# Patient Record
Sex: MALE | Race: WHITE
[De-identification: names, ages, dates, MRNs, and addresses within clinical notes are randomized per-mention and may not be internally consistent; named-entity substitution may affect disease eponyms.]

---

## 2017-06-14 ENCOUNTER — RX ONLY (RX ONLY)
Age: 62
End: 2017-06-14

## 2017-06-14 ENCOUNTER — DOCTOR'S OFFICE (OUTPATIENT)
Dept: URBAN - NONMETROPOLITAN AREA CLINIC 1 | Facility: CLINIC | Age: 62
Setting detail: OPHTHALMOLOGY
End: 2017-06-14
Payer: OTHER GOVERNMENT

## 2017-06-14 DIAGNOSIS — H25.11: ICD-10-CM

## 2017-06-14 DIAGNOSIS — Z96.1: ICD-10-CM

## 2017-06-14 DIAGNOSIS — H35.342: ICD-10-CM

## 2017-06-14 DIAGNOSIS — F17.200: ICD-10-CM

## 2017-06-14 DIAGNOSIS — H18.422: ICD-10-CM

## 2017-06-14 PROCEDURE — 92134 CPTRZ OPH DX IMG PST SGM RTA: CPT | Performed by: OPHTHALMOLOGY

## 2017-06-14 PROCEDURE — 92014 COMPRE OPH EXAM EST PT 1/>: CPT | Performed by: OPHTHALMOLOGY

## 2017-06-14 ASSESSMENT — REFRACTION_MANIFEST
OU_VA: 20/
OS_VA3: 20/
OU_VA: 20/
OD_VA2: 20/
OD_VA3: 20/
OS_VA2: 20/
OD_VA1: 20/
OD_VA3: 20/
OS_VA1: 20/
OD_VA1: 20/
OS_VA1: 20/
OS_VA3: 20/
OD_VA2: 20/
OS_VA2: 20/

## 2017-06-14 ASSESSMENT — VISUAL ACUITY
OS_BCVA: 20/25-1
OD_BCVA: 20/40-2

## 2017-06-14 ASSESSMENT — REFRACTION_AUTOREFRACTION
OD_CYLINDER: -1.50
OS_AXIS: 72
OS_SPHERE: +0.75
OD_SPHERE: +1.00
OD_AXIS: 77
OS_CYLINDER: -1.00

## 2017-06-14 ASSESSMENT — REFRACTION_OUTSIDERX
OS_ADD: +2.50
OD_SPHERE: +0.50
OD_VA1: 20/25
OD_VA2: 20/25-1
OU_VA: 20/
OS_SPHERE: PL
OS_VA3: 20/
OD_VA3: 20/
OD_ADD: +2.50
OS_AXIS: 075
OS_VA2: 20/40
OD_AXIS: 085
OD_CYLINDER: -1.25
OS_CYLINDER: -1.00

## 2017-06-14 ASSESSMENT — SPHEQUIV_DERIVED
OS_SPHEQUIV: 0.25
OD_SPHEQUIV: 0.25

## 2017-06-14 ASSESSMENT — CONFRONTATIONAL VISUAL FIELD TEST (CVF)
OS_FINDINGS: FULL
OD_FINDINGS: FULL

## 2017-06-14 ASSESSMENT — REFRACTION_CURRENTRX
OS_OVR_VA: 20/
OS_OVR_VA: 20/
OD_OVR_VA: 20/
OS_OVR_VA: 20/
OD_OVR_VA: 20/
OD_OVR_VA: 20/

## 2021-02-26 ENCOUNTER — HOSPITAL ENCOUNTER (EMERGENCY)
Facility: HOSPITAL | Age: 66
Discharge: ANOTHER HOSPITAL | End: 2021-02-26
Attending: EMERGENCY MEDICINE | Admitting: EMERGENCY MEDICINE
Payer: MEDICARE

## 2021-02-26 ENCOUNTER — APPOINTMENT (EMERGENCY)
Dept: CT IMAGING | Facility: HOSPITAL | Age: 66
End: 2021-02-26
Payer: MEDICARE

## 2021-02-26 ENCOUNTER — APPOINTMENT (EMERGENCY)
Dept: RADIOLOGY | Facility: HOSPITAL | Age: 66
End: 2021-02-26
Payer: MEDICARE

## 2021-02-26 VITALS
OXYGEN SATURATION: 97 % | WEIGHT: 191.58 LBS | BODY MASS INDEX: 28.38 KG/M2 | RESPIRATION RATE: 21 BRPM | DIASTOLIC BLOOD PRESSURE: 96 MMHG | HEIGHT: 69 IN | HEART RATE: 64 BPM | TEMPERATURE: 99 F | SYSTOLIC BLOOD PRESSURE: 145 MMHG

## 2021-02-26 DIAGNOSIS — R07.9 CHEST PAIN: Primary | ICD-10-CM

## 2021-02-26 DIAGNOSIS — I21.4 NSTEMI (NON-ST ELEVATED MYOCARDIAL INFARCTION) (HCC): ICD-10-CM

## 2021-02-26 LAB
ALBUMIN SERPL BCP-MCNC: 3.9 G/DL (ref 3.5–5)
ALP SERPL-CCNC: 68 U/L (ref 46–116)
ALT SERPL W P-5'-P-CCNC: 47 U/L (ref 12–78)
ANION GAP SERPL CALCULATED.3IONS-SCNC: 9 MMOL/L (ref 4–13)
APTT PPP: 30 SECONDS (ref 23–37)
AST SERPL W P-5'-P-CCNC: 264 U/L (ref 5–45)
BASOPHILS # BLD AUTO: 0.05 THOUSANDS/ΜL (ref 0–0.1)
BASOPHILS NFR BLD AUTO: 0 % (ref 0–1)
BILIRUB SERPL-MCNC: 0.83 MG/DL (ref 0.2–1)
BUN SERPL-MCNC: 13 MG/DL (ref 5–25)
CALCIUM SERPL-MCNC: 9.4 MG/DL (ref 8.3–10.1)
CHLORIDE SERPL-SCNC: 101 MMOL/L (ref 100–108)
CO2 SERPL-SCNC: 26 MMOL/L (ref 21–32)
CREAT SERPL-MCNC: 1.15 MG/DL (ref 0.6–1.3)
EOSINOPHIL # BLD AUTO: 0.02 THOUSAND/ΜL (ref 0–0.61)
EOSINOPHIL NFR BLD AUTO: 0 % (ref 0–6)
ERYTHROCYTE [DISTWIDTH] IN BLOOD BY AUTOMATED COUNT: 13.4 % (ref 11.6–15.1)
FLUAV RNA RESP QL NAA+PROBE: NEGATIVE
FLUBV RNA RESP QL NAA+PROBE: NEGATIVE
GFR SERPL CREATININE-BSD FRML MDRD: 65 ML/MIN/1.73SQ M
GLUCOSE SERPL-MCNC: 117 MG/DL (ref 65–140)
HCT VFR BLD AUTO: 44.2 % (ref 36.5–49.3)
HGB BLD-MCNC: 14.9 G/DL (ref 12–17)
IMM GRANULOCYTES # BLD AUTO: 0.04 THOUSAND/UL (ref 0–0.2)
IMM GRANULOCYTES NFR BLD AUTO: 0 % (ref 0–2)
INR PPP: 0.99 (ref 0.84–1.19)
LYMPHOCYTES # BLD AUTO: 2.1 THOUSANDS/ΜL (ref 0.6–4.47)
LYMPHOCYTES NFR BLD AUTO: 16 % (ref 14–44)
MCH RBC QN AUTO: 31.7 PG (ref 26.8–34.3)
MCHC RBC AUTO-ENTMCNC: 33.7 G/DL (ref 31.4–37.4)
MCV RBC AUTO: 94 FL (ref 82–98)
MONOCYTES # BLD AUTO: 1.13 THOUSAND/ΜL (ref 0.17–1.22)
MONOCYTES NFR BLD AUTO: 9 % (ref 4–12)
NEUTROPHILS # BLD AUTO: 9.9 THOUSANDS/ΜL (ref 1.85–7.62)
NEUTS SEG NFR BLD AUTO: 75 % (ref 43–75)
NRBC BLD AUTO-RTO: 0 /100 WBCS
PLATELET # BLD AUTO: 196 THOUSANDS/UL (ref 149–390)
PMV BLD AUTO: 9.9 FL (ref 8.9–12.7)
POTASSIUM SERPL-SCNC: 4 MMOL/L (ref 3.5–5.3)
PROT SERPL-MCNC: 7.7 G/DL (ref 6.4–8.2)
PROTHROMBIN TIME: 12.9 SECONDS (ref 11.6–14.5)
RBC # BLD AUTO: 4.7 MILLION/UL (ref 3.88–5.62)
RSV RNA RESP QL NAA+PROBE: NEGATIVE
SARS-COV-2 RNA RESP QL NAA+PROBE: NEGATIVE
SODIUM SERPL-SCNC: 136 MMOL/L (ref 136–145)
TROPONIN I SERPL-MCNC: >40 NG/ML
WBC # BLD AUTO: 13.24 THOUSAND/UL (ref 4.31–10.16)

## 2021-02-26 PROCEDURE — 80053 COMPREHEN METABOLIC PANEL: CPT | Performed by: EMERGENCY MEDICINE

## 2021-02-26 PROCEDURE — 96365 THER/PROPH/DIAG IV INF INIT: CPT

## 2021-02-26 PROCEDURE — 93005 ELECTROCARDIOGRAM TRACING: CPT

## 2021-02-26 PROCEDURE — 85610 PROTHROMBIN TIME: CPT | Performed by: EMERGENCY MEDICINE

## 2021-02-26 PROCEDURE — 99291 CRITICAL CARE FIRST HOUR: CPT | Performed by: EMERGENCY MEDICINE

## 2021-02-26 PROCEDURE — 96366 THER/PROPH/DIAG IV INF ADDON: CPT

## 2021-02-26 PROCEDURE — 0241U HB NFCT DS VIR RESP RNA 4 TRGT: CPT | Performed by: EMERGENCY MEDICINE

## 2021-02-26 PROCEDURE — 99285 EMERGENCY DEPT VISIT HI MDM: CPT

## 2021-02-26 PROCEDURE — 70450 CT HEAD/BRAIN W/O DYE: CPT

## 2021-02-26 PROCEDURE — 71045 X-RAY EXAM CHEST 1 VIEW: CPT

## 2021-02-26 PROCEDURE — 85025 COMPLETE CBC W/AUTO DIFF WBC: CPT | Performed by: EMERGENCY MEDICINE

## 2021-02-26 PROCEDURE — 96375 TX/PRO/DX INJ NEW DRUG ADDON: CPT

## 2021-02-26 PROCEDURE — 36415 COLL VENOUS BLD VENIPUNCTURE: CPT | Performed by: EMERGENCY MEDICINE

## 2021-02-26 PROCEDURE — 84484 ASSAY OF TROPONIN QUANT: CPT | Performed by: EMERGENCY MEDICINE

## 2021-02-26 PROCEDURE — 85730 THROMBOPLASTIN TIME PARTIAL: CPT | Performed by: EMERGENCY MEDICINE

## 2021-02-26 RX ORDER — NITROGLYCERIN 0.4 MG/1
0.4 TABLET SUBLINGUAL ONCE
Status: COMPLETED | OUTPATIENT
Start: 2021-02-26 | End: 2021-02-26

## 2021-02-26 RX ORDER — ASPIRIN 81 MG/1
324 TABLET, CHEWABLE ORAL ONCE
Status: COMPLETED | OUTPATIENT
Start: 2021-02-26 | End: 2021-02-26

## 2021-02-26 RX ORDER — HEPARIN SODIUM 10000 [USP'U]/100ML
3-20 INJECTION, SOLUTION INTRAVENOUS
Status: DISCONTINUED | OUTPATIENT
Start: 2021-02-26 | End: 2021-02-26 | Stop reason: HOSPADM

## 2021-02-26 RX ORDER — HEPARIN SODIUM 1000 [USP'U]/ML
4000 INJECTION, SOLUTION INTRAVENOUS; SUBCUTANEOUS ONCE
Status: COMPLETED | OUTPATIENT
Start: 2021-02-26 | End: 2021-02-26

## 2021-02-26 RX ADMIN — ASPIRIN 324 MG: 81 TABLET, CHEWABLE ORAL at 12:00

## 2021-02-26 RX ADMIN — HEPARIN SODIUM 4000 UNITS: 1000 INJECTION INTRAVENOUS; SUBCUTANEOUS at 13:23

## 2021-02-26 RX ADMIN — HEPARIN SODIUM 11.8 UNITS/KG/HR: 10000 INJECTION, SOLUTION INTRAVENOUS at 13:24

## 2021-02-26 RX ADMIN — NITROGLYCERIN 0.4 MG: 0.4 TABLET SUBLINGUAL at 12:00

## 2021-02-26 NOTE — EMTALA/ACUTE CARE TRANSFER
[address]  [facility] [address]  Dept: [phone]      EMTALA TRANSFER CONSENT    NAME Bonnie Tovar                                         1955                              MRN [id number]    I have been informed of my rights regarding examination, treatment, and transfer   by Dr Suzanne Ramos DO    Benefits: Specialized equipment and/or services available at the receiving facility (Include comment)________________________     Need for cardiac catheterization    Risks: Potential for delay in receiving treatment      Consent for Transfer:  I acknowledge that my medical condition has been evaluated and explained to me by the emergency department physician or other qualified medical person and/or my attending physician, who has recommended that I be transferred to the service of  Accepting Physician: Rosa Gutiérrez at [address], [address] : [facility], [facility]  The above potential benefits of such transfer, the potential risks associated with such transfer, and the probable risks of not being transferred have been explained to me, and I fully understand them  The doctor has explained that, in my case, the benefits of transfer outweigh the risks  I agree to be transferred  I authorize the performance of emergency medical procedures and treatments upon me in both transit and upon arrival at the receiving facility  Additionally, I authorize the release of any and all medical records to the receiving facility and request they be transported with me, if possible  I understand that the safest mode of transportation during a medical emergency is an ambulance and that the Hospital advocates the use of this mode of transport   Risks of traveling to the receiving facility by car, including absence of medical control, life sustaining equipment, such as oxygen, and medical personnel has been explained to me and I fully understand them  (JORGE CORRECT BOX BELOW)  Kathya Coup  ]  I consent to the stated transfer and to be transported by ambulance/helicopter  [  ]  I consent to the stated transfer, but refuse transportation by ambulance and accept full responsibility for my transportation by car  I understand the risks of non-ambulance transfers and I exonerate the Hospital and its staff from any deterioration in my condition that results from this refusal     X___________________________________________    DATE  21  TIME_1350_______  Signature of patient or legally responsible individual signing on patient behalf           RELATIONSHIP TO PATIENT___Self_____________          Provider Certification    NAME Crescencio Ramos                                         1955                              MRN [id number]    A medical screening exam was performed on the above named patient  Based on the examination:    Condition Necessitating Transfer The encounter diagnosis was Chest pain  Patient Condition: The patient has been stabilized such that within reasonable medical probability, no material deterioration of the patient condition or the condition of the unborn child(birgit) is likely to result from the transfer    Reason for Transfer: Level of Care needed not available at this facility    Transfer Requirements: [address] [place] [address], [facility]   · Space available and qualified personnel available for treatment as acknowledged by Pratik  · Agreed to accept transfer and to provide appropriate medical treatment as acknowledged by       Shaka Torres  · Appropriate medical records of the examination and treatment of the patient are provided at the time of transfer   [address], [address] _______  · Transfer will be performed by qualified personnel from    and appropriate transfer equipment as required, including the use of necessary and appropriate life support measures      Provider Certification: I have examined the patient and explained the following risks and benefits of being transferred/refusing transfer to the patient/family:  General risk, such as traffic hazards, adverse weather conditions, rough terrain or turbulence, possible failure of equipment (including vehicle or aircraft), or consequences of actions of persons outside the control of the transport personnel, Unanticipated needs of medical equipment and personnel during transport, Risk of worsening condition, The possibility of a transport vehicle being unavailable      Based on these reasonable risks and benefits to the patient and/or the unborn child(birgit), and based upon the information available at the time of the patients examination, I certify that the medical benefits reasonably to be expected from the provision of appropriate medical treatments at another medical facility outweigh the increasing risks, if any, to the individuals medical condition, and in the case of labor to the unborn child, from effecting the transfer      X____________________________________________ DATE 02/26/21        TIME_1350______      ORIGINAL - SEND TO MEDICAL RECORDS   COPY - SEND WITH PATIENT DURING TRANSFER

## 2021-02-26 NOTE — ED NOTES
Patient to be transported at [phone] by [facility]   Dr Dawit Strong is accepting, [facility] [address] [facility]  02/26/21 1527

## 2021-02-26 NOTE — ED PROVIDER NOTES
History  Chief Complaint   Patient presents with    Chest Pain     pt c/o onset chest pain w/pain and tingling down left arm and nausea at 2200 yesterday  pt mentioned he was pushing snow with shovel 2 days prior  denies travel/sob/fevers/cough/v/d     Gela Drivers is a pleasant 67-year-old male presents emergency room with chief complaint left hand wrist and arm numbness which began around 2:00 a m  This morning and was associated with what he is describing as a air bubble in his chest   Patient reports that on Tuesday and Wednesday he was shoveling some snow any was feeling fine Thursday but around 2:00 a m  In the morning he started to have the symptomatology discussed above  Patient reports that he thought this was possibly acid and he went to the store to get some over-the-counter medications and took them without any significant relief  Patient also noticed that since the onset of this he has had some nausea and some decreased appetite and that he has been having intermittent chills and hot flashes  He reports that he has been slightly diaphoretic  He denies any shortness of breath  Patient does have a history of smoking, hyperlipidemia, and hypertension  He reports that he has had a previous history of a TIA but denies any history of cardiac disease  History provided by:  Patient  Chest Pain  Pain location:  L chest and substernal area  Pain quality: aching and pressure    Pain radiates to:  L arm  Pain radiates to the back: no    Pain severity:  Mild  Onset quality:  Gradual  Duration:  10 hours  Timing:  Constant  Progression:  Partially resolved  Chronicity:  New  Context: at rest    Context: not breathing, no movement and not raising an arm    Relieved by:  Nothing  Worsened by:  Nothing tried  Ineffective treatments: Over-the-counter Maalox    Associated symptoms: diaphoresis, nausea and numbness    Associated symptoms: no abdominal pain, no back pain, no cough, no dizziness, no fatigue, no fever, no headache, no palpitations, no shortness of breath, not vomiting and no weakness    Risk factors: high cholesterol, hypertension, male sex and smoking    Risk factors: no coronary artery disease, no diabetes mellitus and no prior DVT/PE        None       Past Medical History:   Diagnosis Date    Hyperlipidemia        History reviewed  No pertinent surgical history  History reviewed  No pertinent family history  I have reviewed and agree with the history as documented  E-Cigarette/Vaping    E-Cigarette Use Never User      E-Cigarette/Vaping Substances     Social History     Tobacco Use    Smoking status: Current Every Day Smoker     Packs/day: 1 00     Types: Cigarettes    Smokeless tobacco: Never Used   Substance Use Topics    Alcohol use: Not Currently    Drug use: Never       Review of Systems   Constitutional: Positive for appetite change, chills and diaphoresis  Negative for activity change, fatigue and fever  HENT: Negative for congestion, ear pain, rhinorrhea, sinus pressure and sore throat  Eyes: Negative  Respiratory: Positive for chest tightness  Negative for cough, shortness of breath and wheezing  Cardiovascular: Negative for chest pain, palpitations and leg swelling  Gastrointestinal: Positive for nausea  Negative for abdominal pain, diarrhea and vomiting  Endocrine: Negative  Genitourinary: Negative for decreased urine volume, dysuria and flank pain  Musculoskeletal: Negative for arthralgias, back pain and myalgias  Skin: Negative for pallor and rash  Allergic/Immunologic: Negative  Neurological: Positive for numbness  Negative for dizziness, weakness and headaches  Hematological: Negative  Psychiatric/Behavioral: Negative  All other systems reviewed and are negative  Physical Exam  Physical Exam  Vitals signs and nursing note reviewed  Constitutional:       General: He is not in acute distress  Appearance: Normal appearance   He is well-developed  He is not ill-appearing or toxic-appearing  HENT:      Head: Normocephalic and atraumatic  Nose: Nose normal       Mouth/Throat:      Mouth: Mucous membranes are moist       Pharynx: Oropharynx is clear  Eyes:      Pupils: Pupils are equal, round, and reactive to light  Neck:      Musculoskeletal: Normal range of motion and neck supple  Cardiovascular:      Rate and Rhythm: Normal rate and regular rhythm  Heart sounds: Normal heart sounds  No murmur  Pulmonary:      Effort: Pulmonary effort is normal  No respiratory distress  Breath sounds: Normal breath sounds  No stridor  No wheezing, rhonchi or rales  Abdominal:      Palpations: Abdomen is soft  Abdomen is not rigid  There is no mass  Tenderness: There is no abdominal tenderness  There is no guarding or rebound  Hernia: No hernia is present  Musculoskeletal: Normal range of motion  Right lower leg: He exhibits no tenderness  No edema  Left lower leg: He exhibits no tenderness  No edema  Lymphadenopathy:      Cervical: No cervical adenopathy  Skin:     General: Skin is warm and dry  Coloration: Skin is not pale  Findings: No rash  Neurological:      General: No focal deficit present  Mental Status: He is alert and oriented to person, place, and time  Motor: No weakness     Psychiatric:         Mood and Affect: Mood normal          Vital Signs  ED Triage Vitals [02/26/21 1058]   Temperature Pulse Respirations Blood Pressure SpO2   99 °F (37 2 °C) 93 17 (!) 166/112 98 %      Temp Source Heart Rate Source Patient Position - Orthostatic VS BP Location FiO2 (%)   Temporal Monitor Lying Left arm --      Pain Score       2           Vitals:    02/26/21 1200 02/26/21 1205 02/26/21 1300 02/26/21 1315   BP: (!) 146/106 140/93 142/93 141/93   Pulse: 72 77 68 64   Patient Position - Orthostatic VS: Lying Lying           Visual Acuity      ED Medications  Medications   heparin (porcine) 25,000 units in 0 45% NaCl 250 mL infusion (premix) (11 8 Units/kg/hr × 85 kg (Order-Specific) Intravenous New Bag 2/26/21 1324)   aspirin chewable tablet 324 mg (324 mg Oral Given 2/26/21 1200)   nitroglycerin (NITROSTAT) SL tablet 0 4 mg (0 4 mg Sublingual Given 2/26/21 1200)   heparin (porcine) injection 4,000 Units (4,000 Units Intravenous Given 2/26/21 1323)       Diagnostic Studies  Results Reviewed     Procedure Component Value Units Date/Time    COVID19, Influenza A/B, RSV PCR, [facility] [[id number]]     Lab Status: No result Specimen: Nares from Nose     APTT [[id number]]  (Normal) Collected: 02/26/21 1322    Lab Status: Final result Specimen: Blood from Arm, Left Updated: 02/26/21 1345     PTT 30 seconds     Protime-INR [[id number]]  (Normal) Collected: 02/26/21 1322    Lab Status: Final result Specimen: Blood from Arm, Left Updated: 02/26/21 1345     Protime 12 9 seconds      INR 0 99    Troponin I [[id number]]  (Abnormal) Collected: 02/26/21 1136    Lab Status: Final result Specimen: Blood from Line, Venous Updated: 02/26/21 1201     Troponin I >40 00 ng/mL     Comprehensive metabolic panel [[id number]]  (Abnormal) Collected: 02/26/21 1136    Lab Status: Final result Specimen: Blood from Line, Venous Updated: 02/26/21 1155     Sodium 136 mmol/L      Potassium 4 0 mmol/L      Chloride 101 mmol/L      CO2 26 mmol/L      ANION GAP 9 mmol/L      BUN 13 mg/dL      Creatinine 1 15 mg/dL      Glucose 117 mg/dL      Calcium 9 4 mg/dL       U/L      ALT 47 U/L      Alkaline Phosphatase 68 U/L      Total Protein 7 7 g/dL      Albumin 3 9 g/dL      Total Bilirubin 0 83 mg/dL      eGFR 65 ml/min/1 73sq m     Narrative:      Johana guidelines for Chronic Kidney Disease (CKD):     Stage 1 with normal or high GFR (GFR > 90 mL/min/1 73 square meters)    Stage 2 Mild CKD (GFR = 60-89 mL/min/1 73 square meters)    Stage 3A Moderate CKD (GFR = 45-59 mL/min/1 73 square meters)    Stage 3B Moderate CKD (GFR = 30-44 mL/min/1 73 square meters)    Stage 4 Severe CKD (GFR = 15-29 mL/min/1 73 square meters)    Stage 5 End Stage CKD (GFR <15 mL/min/1 73 square meters)  Note: GFR calculation is accurate only with a steady state creatinine    CBC and differential [[id number]]  (Abnormal) Collected: 02/26/21 1136    Lab Status: Final result Specimen: Blood from Line, Venous Updated: 02/26/21 1141     WBC 13 24 Thousand/uL      RBC 4 70 Million/uL      Hemoglobin 14 9 g/dL      Hematocrit 44 2 %      MCV 94 fL      MCH 31 7 pg      MCHC 33 7 g/dL      RDW 13 4 %      MPV 9 9 fL      Platelets 944 Thousands/uL      nRBC 0 /100 WBCs      Neutrophils Relative 75 %      Immat GRANS % 0 %      Lymphocytes Relative 16 %      Monocytes Relative 9 %      Eosinophils Relative 0 %      Basophils Relative 0 %      Neutrophils Absolute 9 90 Thousands/µL      Immature Grans Absolute 0 04 Thousand/uL      Lymphocytes Absolute 2 10 Thousands/µL      Monocytes Absolute 1 13 Thousand/µL      Eosinophils Absolute 0 02 Thousand/µL      Basophils Absolute 0 05 Thousands/µL                  CT head without contrast   Final Result by Neha Dumont MD (02/26 1232)      No evidence of acute intracranial process  Chronic microangiopathy  Workstation performed: [id number]         XR chest 1 view portable   Final Result by Bethene Lesches, MD (02/26 1131)      No acute cardiopulmonary disease  Workstation performed: [id number]                    Procedures  Procedures         ED Course  ED Course as of Feb 26 1359   Fri Feb 26, 2021   1227 Patient's troponin greater than 40  History consistent with ACS  Patient with risk factors  Will discuss with Cardiology  Patient is still having some chest discomfort after 1st nitro          1234 Discussed the case with Cardiology and they agree with the plan for transfer out to cardiac catheterization capable center      1235 Will start heparin      1244 CT negative for stroke      1251 Patient has clinical concern for NSTEMI  Patient will require cardiac catheterization capable center  Discussed with patient the possibility of transfer to either [facility] or [facility] facility and patient reports that he would prefer [address] with Dr Krissy Stokes (cardiology) at [facility]  Accepts patient to [place]  Agrees with current plan of treatment      1357 Patient currently reports that he is without pain in his chest   Patient is awaiting transportation to [facility] he is currently hemodynamically stable                HEART Risk Score      Most Recent Value   Heart Score Risk Calculator   History  1 Filed at: 02/26/2021 1152   ECG  1 Filed at: 02/26/2021 1152   Age  2 Filed at: 02/26/2021 1152   Risk Factors  2 Filed at: 02/26/2021 1152   Troponin  0 Filed at: 02/26/2021 1152   HEART Score  6 Filed at: 02/26/2021 1152                      SBIRT 22yo+      Most Recent Value   SBIRT (23 yo +)   In order to provide better care to our patients, we are screening all of our patients for alcohol and drug use  Would it be okay to ask you these screening questions? Yes Filed at: 02/26/2021 1134   Initial Alcohol Screen: US AUDIT-C    1  How often do you have a drink containing alcohol?  0 Filed at: 02/26/2021 1134   2  How many drinks containing alcohol do you have on a typical day you are drinking? 0 Filed at: 02/26/2021 1134   3a  Male UNDER 65: How often do you have five or more drinks on one occasion? 0 Filed at: 02/26/2021 1134   3b  FEMALE Any Age, or MALE 65+: How often do you have 4 or more drinks on one occassion? 0 Filed at: 02/26/2021 1134   Audit-C Score  0 Filed at: 02/26/2021 1134   HERACLIO: How many times in the past year have you    Used an illegal drug or used a prescription medication for non-medical reasons?   Never Filed at: 02/26/2021 1134                    MDM  Number of Diagnoses or Management Options  Chest pain: new and requires workup  NSTEMI (non-ST elevated myocardial infarction) [facility]): new and requires workup  Diagnosis management comments: Patient presented to the ED and was found to be critically ill as demonstrated by the clinical history and primary physical evaluation  Pt had demonstrative findings and derangements of vital signs indicative for severe illness  I personally performed bedside history and evaluation  Interventions to address these clinical needs were ordered/performed  These included, but not necessarily limited to, the ordering and subsequent review of lab studies, imaging and EKG  Please see chart with regards to specific resuscitative interventions  Due to a high probability of clinically significant, life threatening deterioration, the patient required my highest level of care, intervention and attention  I personally spent the documented time directly managing the patient  The critical care time included obtaining a history, examining the patient, ordering and review of studies, arranging urgent treatment with development of a management plan, evaluation of patient's response to treatment, reassessment, and, if warranted, discussions with other providers or consultants  Documentation to the medical record for continuity of care was also required  Patients records pertinent to the emergent presenting condition were reviewed as available  Family was updated as available and appropriate  This critical care time was performed to assess and manage the high probability of imminent, life-threatening deterioration that could result in multi-organ failure if not addressed  It was exclusive of separately billable procedures and treating other patients and teaching time  Please see MDM section and the rest of the note for further information on patient assessment, reassessment, interventions and treatment  Total time was 43 mins exclusive of separate billable procedures      Patient presented to the emergency department and a MSE was performed  The patient was evaluated and diagnosed with acute chest pain and NSTEMI  This is a new issue that will require additional planned work-up and treatment in a hospitalized setting  As may have been required as part of this evaluation, clinical laboratory test, radiology imaging and medical testing (I e  EKG) were ordered as necessitated by the patient's presentation  I independently reviewed these studies, imaging and testing  This patient's case is considered to be a considerable risk secondary to the above listed disease process and poses a threat to the patient's well-being and baseline function  Further in-patient diagnostic testing and management, which may include the administration of parenteral medications, is required  Pt [facility] secondary to the need for possible cardiac catheterization             Amount and/or Complexity of Data Reviewed  Clinical lab tests: ordered and reviewed  Tests in the radiology section of CPT®: ordered and reviewed  Review and summarize past medical records: yes  Discuss the patient with other providers: yes    Risk of Complications, Morbidity, and/or Mortality  Presenting problems: high  Diagnostic procedures: moderate  Management options: moderate    Patient Progress  Patient progress: improved      Disposition  Final diagnoses:   Chest pain   NSTEMI (non-ST elevated myocardial infarction) ([facility] [address] )     Time reflects when diagnosis was documented in both MDM as applicable and the Disposition within this note     Time User Action Codes Description Comment    2/26/2021 11:52 AM Jaime Curran Add [R07 9] Chest pain     2/26/2021  1:58 PM Natalie Sesay Add [I21 4] NSTEMI (non-ST elevated myocardial infarction) [facility])       ED Disposition     ED Disposition Condition Date/Time Comment    Transfer to Another [address] [facility]  Fri Feb 26, 2021 12:43 PM Milton Unger should be transferred out to Gerhardt Neve MD Documentation      Most Recent Value   Patient Condition  The patient has been stabilized such that within reasonable medical probability, no material deterioration of the patient condition or the condition of the unborn child(birgit) is likely to result from the transfer   Reason for Transfer  Level of Care needed not available at this facility   Benefits of Transfer  Specialized equipment and/or services available at the receiving facility (Include comment)________________________   Risks of Transfer  Potential for delay in receiving treatment   Accepting Physician  [address] [place] [address]  Name, [address] [facility] (Name & Tel number)  Frida Haque MD  [facility]   Provider Certification  General risk, such as traffic hazards, adverse weather conditions, rough terrain or turbulence, possible failure of equipment (including vehicle or aircraft), or consequences of actions of persons outside the control of the transport personnel, Unanticipated needs of medical equipment and personnel during transport, Risk of worsening condition, The possibility of a transport vehicle being unavailable      RN Documentation      Most [address] [facility] [address] Name, [address] [facility] (Name & Tel number)  Pratik      Follow-up Information    None         Patient's Medications    No medications on file     No discharge procedures on file      PDMP Review     None          ED Provider  Electronically Signed by           Steve Grissom DO  02/26/21 0669

## 2021-02-28 LAB
ATRIAL RATE: 82 BPM
P AXIS: -9 DEGREES
PR INTERVAL: 170 MS
QRS AXIS: 79 DEGREES
QRSD INTERVAL: 96 MS
QT INTERVAL: 388 MS
QTC INTERVAL: 453 MS
T WAVE AXIS: -8 DEGREES
VENTRICULAR RATE: 82 BPM

## 2021-03-26 DIAGNOSIS — I25.5 ISCHEMIC CARDIOMYOPATHY: ICD-10-CM

## 2021-03-26 DIAGNOSIS — Z13.6 ENCOUNTER FOR SCREENING FOR CARDIOVASCULAR DISORDERS: ICD-10-CM

## 2021-03-26 DIAGNOSIS — Z95.5 PRESENCE OF CORONARY ANGIOPLASTY IMPLANT AND GRAFT: ICD-10-CM

## 2021-04-29 ENCOUNTER — IMMUNIZATIONS (OUTPATIENT)
Dept: FAMILY MEDICINE CLINIC | Facility: HOSPITAL | Age: 66
End: 2021-04-29

## 2021-04-29 DIAGNOSIS — Z23 ENCOUNTER FOR IMMUNIZATION: Primary | ICD-10-CM

## 2021-04-29 PROCEDURE — 91300 SARS-COV-2 / COVID-19 MRNA VACCINE (PFIZER-BIONTECH) 30 MCG: CPT

## 2021-04-29 PROCEDURE — 0001A SARS-COV-2 / COVID-19 MRNA VACCINE (PFIZER-BIONTECH) 30 MCG: CPT

## 2021-05-21 ENCOUNTER — IMMUNIZATIONS (OUTPATIENT)
Dept: FAMILY MEDICINE CLINIC | Facility: HOSPITAL | Age: 66
End: 2021-05-21

## 2021-05-21 DIAGNOSIS — Z23 ENCOUNTER FOR IMMUNIZATION: Primary | ICD-10-CM

## 2021-05-21 PROCEDURE — 91300 SARS-COV-2 / COVID-19 MRNA VACCINE (PFIZER-BIONTECH) 30 MCG: CPT

## 2021-05-21 PROCEDURE — 0002A SARS-COV-2 / COVID-19 MRNA VACCINE (PFIZER-BIONTECH) 30 MCG: CPT

## 2021-07-15 ENCOUNTER — HOSPITAL ENCOUNTER (OUTPATIENT)
Dept: NON INVASIVE DIAGNOSTICS | Facility: CLINIC | Age: 66
Discharge: HOME | End: 2021-07-15
Payer: MEDICARE

## 2021-07-15 DIAGNOSIS — I25.5 ISCHEMIC CARDIOMYOPATHY: ICD-10-CM

## 2021-07-15 DIAGNOSIS — Z95.5 PRESENCE OF CORONARY ANGIOPLASTY IMPLANT AND GRAFT: ICD-10-CM

## 2021-07-15 PROCEDURE — 93308 TTE F-UP OR LMTD: CPT

## 2021-07-15 PROCEDURE — 93325 DOPPLER ECHO COLOR FLOW MAPG: CPT | Performed by: INTERNAL MEDICINE

## 2021-07-15 PROCEDURE — 93308 TTE F-UP OR LMTD: CPT | Performed by: INTERNAL MEDICINE

## 2021-07-15 PROCEDURE — 93321 DOPPLER ECHO F-UP/LMTD STD: CPT | Performed by: INTERNAL MEDICINE

## 2022-05-10 ENCOUNTER — TELEPHONE (OUTPATIENT)
Dept: CARDIOLOGY CLINIC | Facility: CLINIC | Age: 67
End: 2022-05-10

## 2022-05-10 ENCOUNTER — TRANSCRIBE ORDERS (OUTPATIENT)
Dept: CARDIOLOGY CLINIC | Facility: CLINIC | Age: 67
End: 2022-05-10

## 2022-05-10 DIAGNOSIS — Z80.42 FAMILY HISTORY OF PROSTATE CANCER: ICD-10-CM

## 2022-05-10 DIAGNOSIS — N13.8 BPH WITH OBSTRUCTION/LOWER URINARY TRACT SYMPTOMS: Primary | ICD-10-CM

## 2022-05-10 DIAGNOSIS — N40.1 BPH WITH OBSTRUCTION/LOWER URINARY TRACT SYMPTOMS: Primary | ICD-10-CM

## 2022-05-10 NOTE — TELEPHONE ENCOUNTER
LM for pt to call back and sched an apt with urology Patient requires pre op labs. Added STAT labs to appt on 5/6/19.

## 2022-05-12 ENCOUNTER — TELEPHONE (OUTPATIENT)
Dept: CARDIOLOGY CLINIC | Facility: CLINIC | Age: 67
End: 2022-05-12

## 2022-05-12 NOTE — TELEPHONE ENCOUNTER
Addendum  created 07/07/20 0729 by Yuval Jurado CRNA    Intraprocedure Flowsheets edited Left 2nd message for pt to set up an apt with urology

## 2022-05-16 ENCOUNTER — HOSPITAL ENCOUNTER (OUTPATIENT)
Dept: ULTRASOUND IMAGING | Facility: HOSPITAL | Age: 67
Discharge: HOME | End: 2022-05-16
Payer: MEDICARE

## 2022-05-16 DIAGNOSIS — Z13.6 ENCOUNTER FOR SCREENING FOR CARDIOVASCULAR DISORDERS: ICD-10-CM

## 2022-05-16 PROCEDURE — 76706 US ABDL AORTA SCREEN AAA: CPT

## 2022-05-17 ENCOUNTER — TELEPHONE (OUTPATIENT)
Dept: CARDIOLOGY CLINIC | Facility: CLINIC | Age: 67
End: 2022-05-17

## 2022-08-01 RX ORDER — LOSARTAN POTASSIUM 25 MG/1
TABLET ORAL
COMMUNITY
Start: 2022-05-16

## 2022-08-01 RX ORDER — UBIDECARENONE 100 MG
200 CAPSULE ORAL 2 TIMES DAILY
COMMUNITY
Start: 2022-05-16

## 2022-08-01 RX ORDER — METOPROLOL SUCCINATE 25 MG/1
TABLET, EXTENDED RELEASE ORAL
COMMUNITY
Start: 2022-05-16

## 2022-08-01 RX ORDER — VARENICLINE TARTRATE 1 MG/1
TABLET, FILM COATED ORAL
COMMUNITY
Start: 2022-05-17

## 2022-08-01 RX ORDER — TAMSULOSIN HYDROCHLORIDE 0.4 MG/1
0.4 CAPSULE ORAL EVERY MORNING
COMMUNITY
Start: 2022-05-05

## 2022-08-01 RX ORDER — ATORVASTATIN CALCIUM 40 MG/1
TABLET, FILM COATED ORAL
COMMUNITY
Start: 2022-05-11

## 2022-08-01 RX ORDER — ASPIRIN 81 MG/1
81 TABLET, CHEWABLE ORAL DAILY
COMMUNITY
Start: 2022-05-16

## 2022-08-01 RX ORDER — ROSUVASTATIN CALCIUM 40 MG/1
TABLET, COATED ORAL
COMMUNITY
Start: 2022-05-16

## 2022-08-03 ENCOUNTER — OFFICE VISIT (OUTPATIENT)
Dept: UROLOGY | Facility: CLINIC | Age: 67
End: 2022-08-03
Payer: MEDICARE

## 2022-08-03 VITALS
BODY MASS INDEX: 27.85 KG/M2 | DIASTOLIC BLOOD PRESSURE: 82 MMHG | HEIGHT: 69 IN | HEART RATE: 95 BPM | SYSTOLIC BLOOD PRESSURE: 138 MMHG | OXYGEN SATURATION: 98 % | WEIGHT: 188 LBS

## 2022-08-03 DIAGNOSIS — N40.1 BPH WITH OBSTRUCTION/LOWER URINARY TRACT SYMPTOMS: ICD-10-CM

## 2022-08-03 DIAGNOSIS — N13.8 BPH WITH OBSTRUCTION/LOWER URINARY TRACT SYMPTOMS: ICD-10-CM

## 2022-08-03 DIAGNOSIS — Z12.5 PROSTATE CANCER SCREENING: ICD-10-CM

## 2022-08-03 PROCEDURE — 99203 OFFICE O/P NEW LOW 30 MIN: CPT

## 2022-08-12 ENCOUNTER — LAB (OUTPATIENT)
Dept: LAB | Facility: HOSPITAL | Age: 67
End: 2022-08-12
Payer: MEDICARE

## 2022-08-12 DIAGNOSIS — Z12.5 PROSTATE CANCER SCREENING: ICD-10-CM

## 2022-08-12 LAB — PSA SERPL-MCNC: 1 NG/ML (ref 0–4)

## 2022-08-12 PROCEDURE — 36415 COLL VENOUS BLD VENIPUNCTURE: CPT

## 2022-08-12 PROCEDURE — G0103 PSA SCREENING: HCPCS

## 2022-08-31 ENCOUNTER — TELEPHONE (OUTPATIENT)
Dept: CARDIOLOGY CLINIC | Facility: CLINIC | Age: 67
End: 2022-08-31

## 2023-07-03 ENCOUNTER — HOSPITAL ENCOUNTER (OUTPATIENT)
Dept: NON INVASIVE DIAGNOSTICS | Facility: HOSPITAL | Age: 68
Discharge: HOME | End: 2023-07-03
Attending: INTERNAL MEDICINE
Payer: MEDICARE

## 2023-07-03 VITALS
HEART RATE: 72 BPM | SYSTOLIC BLOOD PRESSURE: 138 MMHG | BODY MASS INDEX: 27.85 KG/M2 | HEIGHT: 69 IN | DIASTOLIC BLOOD PRESSURE: 82 MMHG | WEIGHT: 188 LBS

## 2023-07-03 DIAGNOSIS — I25.5 ISCHEMIC CARDIOMYOPATHY: ICD-10-CM

## 2023-07-03 LAB
AORTIC ROOT: 4.3 CM
APICAL FOUR CHAMBER EJECTION FRACTION: 58 %
ASCENDING AORTA: 4 CM
E WAVE DECELERATION TIME: 205 MS
FRACTIONAL SHORTENING: 14 (ref 28–44)
INTERVENTRICULAR SEPTUM IN DIASTOLE (PARASTERNAL SHORT AXIS VIEW): 1 CM
INTERVENTRICULAR SEPTUM: 1 CM (ref 0.6–1.1)
LAAS-AP2: 16.4 CM2
LAAS-AP4: 17.8 CM2
LEFT ATRIUM SIZE: 3.9 CM
LEFT ATRIUM VOLUME (MOD BIPLANE): 46 ML
LEFT INTERNAL DIMENSION IN SYSTOLE: 4.9 CM (ref 2.1–4)
LEFT VENTRICLE DIASTOLIC VOLUME (MOD BIPLANE): 80 ML
LEFT VENTRICLE SYSTOLIC VOLUME (MOD BIPLANE): 34 ML
LEFT VENTRICULAR INTERNAL DIMENSION IN DIASTOLE: 5.7 CM (ref 3.5–6)
LEFT VENTRICULAR POSTERIOR WALL IN END DIASTOLE: 0.8 CM
LEFT VENTRICULAR STROKE VOLUME: 48 ML
LV EF: 57 %
LVSV (TEICH): 48 ML
MV E'TISSUE VEL-LAT: 9 CM/S
MV E'TISSUE VEL-SEP: 6 CM/S
MV PEAK A VEL: 0.46 M/S
MV PEAK E VEL: 32 CM/S
MV STENOSIS PRESSURE HALF TIME: 59 MS
MV VALVE AREA P 1/2 METHOD: 3.73
RA PRESSURE ESTIMATED: 3 MMHG
RIGHT ATRIAL 2D VOLUME: 27 ML
RIGHT ATRIUM AREA SYSTOLE A4C: 13.6 CM2
RIGHT VENTRICLE ID DIMENSION: 3.6 CM
SINOTUBULAR JUNCTION: 3.8 CM
SL CV LEFT ATRIUM LENGTH A2C: 5 CM
SL CV LV EF: 55
SL CV PED ECHO LEFT VENTRICLE DIASTOLIC VOLUME (MOD BIPLANE) 2D: 161 ML
SL CV PED ECHO LEFT VENTRICLE SYSTOLIC VOLUME (MOD BIPLANE) 2D: 113 ML
SL CV SINUS OF VALSALVA 2D: 4.2 CM
STJ: 3.8 CM
TRICUSPID ANNULAR PLANE SYSTOLIC EXCURSION: 2.2 CM

## 2023-07-03 PROCEDURE — 93306 TTE W/DOPPLER COMPLETE: CPT

## 2023-07-03 PROCEDURE — 93306 TTE W/DOPPLER COMPLETE: CPT | Performed by: INTERNAL MEDICINE

## 2024-03-13 ENCOUNTER — HOSPITAL ENCOUNTER (OUTPATIENT)
Dept: NON INVASIVE DIAGNOSTICS | Facility: HOSPITAL | Age: 69
Discharge: HOME | End: 2024-03-13
Payer: MEDICARE

## 2024-03-13 DIAGNOSIS — R09.89 DECREASED PULSE: ICD-10-CM

## 2024-03-13 PROCEDURE — 93923 UPR/LXTR ART STDY 3+ LVLS: CPT

## 2024-03-14 PROCEDURE — 93923 UPR/LXTR ART STDY 3+ LVLS: CPT | Performed by: SURGERY

## 2024-08-28 ENCOUNTER — TELEPHONE (OUTPATIENT)
Facility: CLINIC | Age: 69
End: 2024-08-28

## 2024-08-28 NOTE — TELEPHONE ENCOUNTER
Patient states that he has a fungal rash on his hands and feet that is being treated by his dermatologist. He states that he is having some differences of opinion with his dermatologist and would like a second opinion from wound care. I stated that unfortunately, the wound center does not see rashes and a fungal rash would be best treated by dermatology. He verbalized understanding and states that he will follow up with dermatology.

## 2024-08-29 ENCOUNTER — TELEPHONE (OUTPATIENT)
Age: 69
End: 2024-08-29

## 2024-08-29 NOTE — TELEPHONE ENCOUNTER
Rec'd call from patient requesting new patient for a second opinion. I offered next available. Patient declined scheduling at this time.

## 2024-09-24 ENCOUNTER — HOSPITAL ENCOUNTER (INPATIENT)
Facility: HOSPITAL | Age: 69
LOS: 6 days | Discharge: HOME | DRG: 603 | End: 2024-09-30
Attending: EMERGENCY MEDICINE | Admitting: FAMILY MEDICINE
Payer: MEDICARE

## 2024-09-24 ENCOUNTER — APPOINTMENT (EMERGENCY)
Dept: CT IMAGING | Facility: HOSPITAL | Age: 69
DRG: 603 | End: 2024-09-24
Payer: MEDICARE

## 2024-09-24 DIAGNOSIS — B35.1 ONYCHOMYCOSIS: ICD-10-CM

## 2024-09-24 DIAGNOSIS — L03.115 CELLULITIS OF RIGHT LOWER EXTREMITY: ICD-10-CM

## 2024-09-24 DIAGNOSIS — R21 RASH: Primary | ICD-10-CM

## 2024-09-24 DIAGNOSIS — I73.9 PERIPHERAL ARTERIAL DISEASE (HCC): ICD-10-CM

## 2024-09-24 DIAGNOSIS — L03.115 CELLULITIS OF RIGHT FOOT: ICD-10-CM

## 2024-09-24 DIAGNOSIS — L30.1 DYSHIDROTIC ECZEMA: ICD-10-CM

## 2024-09-24 DIAGNOSIS — I73.9 PAD (PERIPHERAL ARTERY DISEASE) (HCC): ICD-10-CM

## 2024-09-24 PROBLEM — L03.90 CELLULITIS: Status: ACTIVE | Noted: 2024-09-24

## 2024-09-24 PROBLEM — J44.9 CHRONIC OBSTRUCTIVE PULMONARY DISEASE (COPD) (HCC): Status: ACTIVE | Noted: 2024-09-24

## 2024-09-24 PROBLEM — Z95.5 STATUS POST PRIMARY ANGIOPLASTY WITH CORONARY STENT: Status: ACTIVE | Noted: 2021-03-10

## 2024-09-24 PROBLEM — I25.10 CAD (CORONARY ARTERY DISEASE): Status: ACTIVE | Noted: 2021-03-10

## 2024-09-24 PROBLEM — Z72.0 TOBACCO USER: Status: ACTIVE | Noted: 2024-09-24

## 2024-09-24 LAB
ANION GAP SERPL CALCULATED.3IONS-SCNC: 6 MMOL/L (ref 4–13)
BASOPHILS # BLD AUTO: 0.06 THOUSANDS/ΜL (ref 0–0.1)
BASOPHILS NFR BLD AUTO: 1 % (ref 0–1)
BUN SERPL-MCNC: 11 MG/DL (ref 5–25)
CALCIUM SERPL-MCNC: 9.2 MG/DL (ref 8.4–10.2)
CHLORIDE SERPL-SCNC: 103 MMOL/L (ref 96–108)
CK SERPL-CCNC: 118 U/L (ref 39–308)
CO2 SERPL-SCNC: 28 MMOL/L (ref 21–32)
CREAT SERPL-MCNC: 0.97 MG/DL (ref 0.6–1.3)
EOSINOPHIL # BLD AUTO: 0.17 THOUSAND/ΜL (ref 0–0.61)
EOSINOPHIL NFR BLD AUTO: 2 % (ref 0–6)
ERYTHROCYTE [DISTWIDTH] IN BLOOD BY AUTOMATED COUNT: 13.7 % (ref 11.6–15.1)
EST. AVERAGE GLUCOSE BLD GHB EST-MCNC: 123 MG/DL
GFR SERPL CREATININE-BSD FRML MDRD: 78 ML/MIN/1.73SQ M
GLUCOSE SERPL-MCNC: 86 MG/DL (ref 65–140)
HBA1C MFR BLD: 5.9 %
HCT VFR BLD AUTO: 44.4 % (ref 36.5–49.3)
HGB BLD-MCNC: 14.9 G/DL (ref 12–17)
IMM GRANULOCYTES # BLD AUTO: 0.04 THOUSAND/UL (ref 0–0.2)
IMM GRANULOCYTES NFR BLD AUTO: 0 % (ref 0–2)
LYMPHOCYTES # BLD AUTO: 1.58 THOUSANDS/ΜL (ref 0.6–4.47)
LYMPHOCYTES NFR BLD AUTO: 15 % (ref 14–44)
MCH RBC QN AUTO: 31.2 PG (ref 26.8–34.3)
MCHC RBC AUTO-ENTMCNC: 33.6 G/DL (ref 31.4–37.4)
MCV RBC AUTO: 93 FL (ref 82–98)
MONOCYTES # BLD AUTO: 0.74 THOUSAND/ΜL (ref 0.17–1.22)
MONOCYTES NFR BLD AUTO: 7 % (ref 4–12)
NEUTROPHILS # BLD AUTO: 7.77 THOUSANDS/ΜL (ref 1.85–7.62)
NEUTS SEG NFR BLD AUTO: 75 % (ref 43–75)
NRBC BLD AUTO-RTO: 0 /100 WBCS
PLATELET # BLD AUTO: 226 THOUSANDS/UL (ref 149–390)
PMV BLD AUTO: 9.1 FL (ref 8.9–12.7)
POTASSIUM SERPL-SCNC: 3.7 MMOL/L (ref 3.5–5.3)
PROCALCITONIN SERPL-MCNC: <0.05 NG/ML
RBC # BLD AUTO: 4.78 MILLION/UL (ref 3.88–5.62)
SODIUM SERPL-SCNC: 137 MMOL/L (ref 135–147)
WBC # BLD AUTO: 10.36 THOUSAND/UL (ref 4.31–10.16)

## 2024-09-24 PROCEDURE — 87081 CULTURE SCREEN ONLY: CPT

## 2024-09-24 PROCEDURE — 99285 EMERGENCY DEPT VISIT HI MDM: CPT | Performed by: EMERGENCY MEDICINE

## 2024-09-24 PROCEDURE — 87040 BLOOD CULTURE FOR BACTERIA: CPT | Performed by: EMERGENCY MEDICINE

## 2024-09-24 PROCEDURE — 73701 CT LOWER EXTREMITY W/DYE: CPT

## 2024-09-24 PROCEDURE — 87186 SC STD MICRODIL/AGAR DIL: CPT

## 2024-09-24 PROCEDURE — 96365 THER/PROPH/DIAG IV INF INIT: CPT

## 2024-09-24 PROCEDURE — 80048 BASIC METABOLIC PNL TOTAL CA: CPT | Performed by: EMERGENCY MEDICINE

## 2024-09-24 PROCEDURE — 87205 SMEAR GRAM STAIN: CPT

## 2024-09-24 PROCEDURE — 99285 EMERGENCY DEPT VISIT HI MDM: CPT

## 2024-09-24 PROCEDURE — 87077 CULTURE AEROBIC IDENTIFY: CPT

## 2024-09-24 PROCEDURE — 87070 CULTURE OTHR SPECIMN AEROBIC: CPT

## 2024-09-24 PROCEDURE — 82550 ASSAY OF CK (CPK): CPT | Performed by: EMERGENCY MEDICINE

## 2024-09-24 PROCEDURE — 85025 COMPLETE CBC W/AUTO DIFF WBC: CPT | Performed by: EMERGENCY MEDICINE

## 2024-09-24 PROCEDURE — 36415 COLL VENOUS BLD VENIPUNCTURE: CPT | Performed by: EMERGENCY MEDICINE

## 2024-09-24 PROCEDURE — 99223 1ST HOSP IP/OBS HIGH 75: CPT | Performed by: FAMILY MEDICINE

## 2024-09-24 PROCEDURE — 84145 PROCALCITONIN (PCT): CPT | Performed by: EMERGENCY MEDICINE

## 2024-09-24 PROCEDURE — 83036 HEMOGLOBIN GLYCOSYLATED A1C: CPT

## 2024-09-24 RX ORDER — VANCOMYCIN HYDROCHLORIDE 750 MG/150ML
750 INJECTION, SOLUTION INTRAVENOUS EVERY 12 HOURS
Status: DISCONTINUED | OUTPATIENT
Start: 2024-09-24 | End: 2024-09-25

## 2024-09-24 RX ORDER — NICOTINE 21 MG/24HR
1 PATCH, TRANSDERMAL 24 HOURS TRANSDERMAL DAILY
Status: DISCONTINUED | OUTPATIENT
Start: 2024-09-25 | End: 2024-09-30 | Stop reason: HOSPADM

## 2024-09-24 RX ORDER — LOSARTAN POTASSIUM 25 MG/1
25 TABLET ORAL
Status: DISCONTINUED | OUTPATIENT
Start: 2024-09-25 | End: 2024-09-30 | Stop reason: HOSPADM

## 2024-09-24 RX ORDER — CEFAZOLIN SODIUM 1 G/50ML
1000 SOLUTION INTRAVENOUS EVERY 8 HOURS
Status: DISCONTINUED | OUTPATIENT
Start: 2024-09-25 | End: 2024-09-25

## 2024-09-24 RX ORDER — EZETIMIBE 10 MG/1
10 TABLET ORAL DAILY
Status: DISCONTINUED | OUTPATIENT
Start: 2024-09-25 | End: 2024-09-30 | Stop reason: HOSPADM

## 2024-09-24 RX ORDER — METOPROLOL SUCCINATE 25 MG/1
25 TABLET, EXTENDED RELEASE ORAL DAILY
Status: DISCONTINUED | OUTPATIENT
Start: 2024-09-25 | End: 2024-09-30 | Stop reason: HOSPADM

## 2024-09-24 RX ORDER — FOLIC ACID 1 MG/1
1 TABLET ORAL DAILY
COMMUNITY

## 2024-09-24 RX ORDER — CEFTRIAXONE 2 G/50ML
2000 INJECTION, SOLUTION INTRAVENOUS ONCE
Status: COMPLETED | OUTPATIENT
Start: 2024-09-24 | End: 2024-09-24

## 2024-09-24 RX ORDER — TAMSULOSIN HYDROCHLORIDE 0.4 MG/1
0.4 CAPSULE ORAL EVERY MORNING
Status: DISCONTINUED | OUTPATIENT
Start: 2024-09-25 | End: 2024-09-30 | Stop reason: HOSPADM

## 2024-09-24 RX ORDER — EZETIMIBE 10 MG/1
10 TABLET ORAL DAILY
COMMUNITY

## 2024-09-24 RX ORDER — CEPHALEXIN 500 MG/1
500 CAPSULE ORAL 3 TIMES DAILY
COMMUNITY
Start: 2024-09-11 | End: 2024-09-30

## 2024-09-24 RX ORDER — METHOTREXATE 2.5 MG/1
2.5 TABLET ORAL
COMMUNITY

## 2024-09-24 RX ORDER — FUROSEMIDE 10 MG/ML
40 INJECTION INTRAMUSCULAR; INTRAVENOUS ONCE
Status: COMPLETED | OUTPATIENT
Start: 2024-09-24 | End: 2024-09-24

## 2024-09-24 RX ORDER — ASPIRIN 81 MG/1
81 TABLET, CHEWABLE ORAL DAILY
Status: DISCONTINUED | OUTPATIENT
Start: 2024-09-25 | End: 2024-09-26

## 2024-09-24 RX ORDER — UBIDECARENONE 30 MG
200 CAPSULE ORAL 2 TIMES DAILY
Status: DISCONTINUED | OUTPATIENT
Start: 2024-09-24 | End: 2024-09-30 | Stop reason: HOSPADM

## 2024-09-24 RX ORDER — ENOXAPARIN SODIUM 100 MG/ML
40 INJECTION SUBCUTANEOUS DAILY
Status: DISCONTINUED | OUTPATIENT
Start: 2024-09-25 | End: 2024-09-26

## 2024-09-24 RX ADMIN — Medication 200 MG: at 18:43

## 2024-09-24 RX ADMIN — VANCOMYCIN HYDROCHLORIDE 750 MG: 750 INJECTION, SOLUTION INTRAVENOUS at 21:10

## 2024-09-24 RX ADMIN — CEFTRIAXONE 2000 MG: 2 INJECTION, SOLUTION INTRAVENOUS at 15:55

## 2024-09-24 RX ADMIN — IOHEXOL 100 ML: 350 INJECTION, SOLUTION INTRAVENOUS at 15:38

## 2024-09-24 RX ADMIN — FUROSEMIDE 40 MG: 10 INJECTION, SOLUTION INTRAMUSCULAR; INTRAVENOUS at 18:43

## 2024-09-24 NOTE — ASSESSMENT & PLAN NOTE
History of COPD, not currently in acute exacerbation, not on any maintenance medications for this  Outpatient follow up

## 2024-09-24 NOTE — ED PROVIDER NOTES
1. Rash    2. Cellulitis of right foot    3. Onychomycosis      ED Disposition       ED Disposition   Admit    Condition   Stable    Date/Time   Tue Sep 24, 2024  4:15 PM    Comment                  Assessment & Plan       Medical Decision Making  Patient presented to the emergency department and a MSE was performed. The patient was evaluated for complaint related to acute erythema and swelling to right foot.  Patient is potentially at risk for, but not limited to, cellulitis, abscess, necrotizing fasciitis, dermatitis, allergic reaction.  Several of these diagnoses have been evaluated and ruled out by history and physical.  As needed, patient will be further evaluated with laboratory and imaging studies.  Higher level diagnostics, such as CT imaging or ultrasound, may also be required.  Please see work-up portion of the note for further evaluation of patient's risk.  Socioeconomic factors were also considered as part of the decision-making process.  Unless otherwise stated in the chart or patient is admitted as elsewhere documented, any previously prescribed medications will be maintained.      Problems Addressed:  Cellulitis of right foot: acute illness or injury  Onychomycosis: chronic illness or injury with exacerbation, progression, or side effects of treatment  Rash: acute illness or injury    Amount and/or Complexity of Data Reviewed  Labs: ordered.  Radiology: ordered.    Risk  Prescription drug management.  Decision regarding hospitalization.  Risk Details: Patient presented to the emergency department and a MSE was performed. The patient was evaluated and diagnosed with acute cellulitis of right foot. This is a new issue that will require additional planned work-up and treatment in a hospitalized setting. As may have been required as part of this evaluation, clinical laboratory test, radiology imaging and medical testing (I.e. EKG) were ordered as necessitated by the patient's presentation. I independently  reviewed these studies, imaging and testing. This patient's case is considered to be a considerable risk secondary to the above listed disease process and poses a threat to the patient's well-being and baseline function. Further in-patient diagnostic testing and management, which may include the administration of parenteral medications, is required.                    ED Course as of 09/24/24 1617   Tue Sep 24, 2024   1452 Imaging I obtained today is markedly worse than images most recently obtained that are available in patient's chart.   1610 CT shows no evidence of cellulitis.       Medications   cefTRIAXone (ROCEPHIN) IVPB (premix in dextrose) 2,000 mg 50 mL (2,000 mg Intravenous New Bag 9/24/24 1555)   iohexol (OMNIPAQUE) 350 MG/ML injection (MULTI-DOSE) 100 mL (100 mL Intravenous Given 9/24/24 1538)       History of Present Illness       69-year-old male to the emergency room for evaluation of right foot swelling and drainage.  Patient has been following he reports for over a year with dermatology secondary to he believes to be a fungal infection which has been both treated with Diflucan and also another episode with cephalexin.  Review of the chart would indicate that the patient has been trying to follow-up with dermatology and wound care.  He was last on cephalexin and methotrexate on 9/11/2024.  Patient reports that the right foot is markedly worse and swollen at this time.  No fevers or chills.  Patient had been diagnosed at that time with eczema NOS      History provided by:  Patient      Review of Systems   Constitutional:  Negative for fatigue and fever.   Respiratory:  Negative for shortness of breath.    Cardiovascular:  Positive for leg swelling.   Gastrointestinal:  Negative for diarrhea, nausea and vomiting.   Musculoskeletal:  Positive for joint swelling.   Skin:  Positive for color change, rash and wound.           Objective     ED Triage Vitals [09/24/24 1424]   Temp Pulse Blood Pressure  Respirations SpO2 Patient Position - Orthostatic VS   -- 82 (!) 164/107 18 99 % Lying      Temp src Heart Rate Source BP Location FiO2 (%) Pain Score    -- Monitor Right arm -- --        Physical Exam  Vitals and nursing note reviewed.   Constitutional:       General: He is in acute distress.      Appearance: Normal appearance. He is well-developed. He is not ill-appearing or toxic-appearing.   HENT:      Head: Normocephalic and atraumatic. Hair is normal.      Jaw: No pain on movement.      Right Ear: External ear normal.      Left Ear: External ear normal.      Nose: Congestion present.      Mouth/Throat:      Mouth: Mucous membranes are moist.   Eyes:      General: Lids are normal.      Extraocular Movements: Extraocular movements intact.      Conjunctiva/sclera: Conjunctivae normal.      Pupils: Pupils are equal, round, and reactive to light.   Cardiovascular:      Rate and Rhythm: Normal rate and regular rhythm.      Heart sounds: Normal heart sounds. No murmur heard.  Pulmonary:      Effort: Pulmonary effort is normal. No respiratory distress.      Breath sounds: Normal breath sounds. No decreased breath sounds, wheezing, rhonchi or rales.   Abdominal:      Palpations: Abdomen is not rigid.   Musculoskeletal:         General: Swelling present. No tenderness. Normal range of motion.      Cervical back: Normal range of motion and neck supple.   Skin:     Findings: Rash present.      Comments: Please see clinical image below.  Cellulitis   Onychomycosis    Neurological:      Mental Status: He is alert.   Psychiatric:         Attention and Perception: Attention normal.         Speech: Speech normal.                       Labs Reviewed   CBC AND DIFFERENTIAL - Abnormal       Result Value    WBC 10.36 (*)     RBC 4.78      Hemoglobin 14.9      Hematocrit 44.4      MCV 93      MCH 31.2      MCHC 33.6      RDW 13.7      MPV 9.1      Platelets 226      nRBC 0      Segmented % 75      Immature Grans % 0      Lymphocytes  % 15      Monocytes % 7      Eosinophils Relative 2      Basophils Relative 1      Absolute Neutrophils 7.77 (*)     Absolute Immature Grans 0.04      Absolute Lymphocytes 1.58      Absolute Monocytes 0.74      Eosinophils Absolute 0.17      Basophils Absolute 0.06     CK - Normal    Total      PROCALCITONIN TEST - Normal    Procalcitonin <0.05     BLOOD CULTURE   BLOOD CULTURE   BASIC METABOLIC PANEL    Sodium 137      Potassium 3.7      Chloride 103      CO2 28      ANION GAP 6      BUN 11      Creatinine 0.97      Glucose 86      Calcium 9.2      eGFR 78      Narrative:     [employer] guidelines for Chronic Kidney Disease (CKD):     Stage 1 with normal or high GFR (GFR > 90 mL/min/1.73 square meters)    Stage 2 Mild CKD (GFR = 60-89 mL/min/1.73 square meters)    Stage 3A Moderate CKD (GFR = 45-59 mL/min/1.73 square meters)    Stage 3B Moderate CKD (GFR = 30-44 mL/min/1.73 square meters)    Stage 4 Severe CKD (GFR = 15-29 mL/min/1.73 square meters)    Stage 5 End Stage CKD (GFR <15 mL/min/1.73 square meters)  Note: GFR calculation is accurate only with a steady state creatinine     CT lower extremity w contrast right   Final Interpretation by Roge Silveira MD (09/24 1607)      Extensive subcutaneous edema throughout the foot and ankle which could be cellulitis, without abscess formation. No evidence of osteomyelitis.         Workstation performed: [id number]             Procedures    ED Medication and Procedure Management   Prior to Admission Medications   Prescriptions Last Dose Informant Patient Reported? Taking?   Cholecalciferol 1.25 MG (35085 UT) capsule   Yes No   Sig: Take 50,000 Units by mouth   aspirin 81 mg chewable tablet   Yes No   Sig: Chew 81 mg daily   atorvastatin (LIPITOR) 40 mg tablet   Yes No   co-enzyme Q-10 100 mg capsule   Yes No   Sig: Take 200 mg by mouth 2 (two) times a day   losartan (COZAAR) 25 mg tablet   Yes No   metoprolol succinate (TOPROL-XL) 25 mg 24  hr tablet   Yes No   rosuvastatin (CRESTOR) 40 MG tablet   Yes No   tamsulosin (FLOMAX) 0.4 mg   Yes No   Sig: Take 0.4 mg by mouth every morning   ticagrelor 60 MG   Yes No   varenicline (CHANTIX) 1 mg tablet   Yes No   Sig: Days 1 to 3: Oral: 0.5 mg once dailyDays 4 to 7: Oral: 0.5 mg twice daily  There after Oral: 1 mg twice daily for 11 weeks   Patient not taking: Reported on 8/3/2022      Facility-Administered Medications: None     Patient's Medications   Discharge Prescriptions    No medications on file     No discharge procedures on file.     Nicholas Sesay,   09/24/24 1616       Nicholas Sesay,   09/24/24 1617

## 2024-09-24 NOTE — H&P
H&P - Hospitalist   Name: Jose Elias Mcgrath 69 y.o. male I MRN: [id number]  Unit/Bed#: ED 12 I Date of Admission: 9/24/2024   Date of Service: 9/24/2024 I Hospital Day: 0     Assessment & Plan  Cellulitis  Presented to ED with right foot swelling and drainage. States that he has been following with dermatology x 1 year and has been on diflucan previously for what he thought was fungal infection. Recently given keflex and diagnosed with eczema he said. States that he has been having worsening swelling, pain, and redness.   Outpatient therapy trialed with keflex and recently started on methotrexate for his eczema however he has not been taking the methotrexate.   Only started taking keflex around 4 days ago he said, but was prescribed on 9/11.   CT RLE: Extensive subcutaneous edema throughout the foot and ankle which could be cellulitis, without abscess formation. No evidence of osteomyelitis.  Procal normal  Was started on rocephin in the ED; will continue on ancef and vancomycin  Will obtain blood and wound culture  Give 1 dose lasix due to swelling, venous duplex, arterial duplex due to chronic nonhealing nature. Compression wrapping, elevation  Follow CBC and fever curve  ID consult    Lab Results   Component Value Date    WBC 10.36 (H) 09/24/2024    WBC 13.24 (H) 02/26/2021     Chronic obstructive pulmonary disease (COPD) (HCC)  History of COPD, not currently in acute exacerbation, not on any maintenance medications for this  Outpatient follow up  Benign prostatic hyperplasia with urinary obstruction  Continue flomax  Urinary retention protocol  Hyperlipidemia  Lipitor intolerance, currently on crestor 20 mg every other day and zetia 10 mg daily - continue  Status post primary angioplasty with coronary stent  History of angioplasty with coronary stent, follows with cardiology outpatient currently on aspirin 81 mg daily and brillinta 60 mg bid, zetia and crestor, will continue at this time  Continue outpatient  cardiology follow up   Tobacco user  Encourage smoking cessation, nicotine patch ordered    VTE Pharmacologic Prophylaxis: VTE Score: 5 High Risk (Score >/= 5) - Pharmacological DVT Prophylaxis Ordered: enoxaparin (Lovenox). Sequential Compression Devices Ordered.  Code Status: Level 1 - Full Code   Discussion with family: Patient declined call to .     Anticipated Length of Stay: Patient will be admitted on an inpatient basis with an anticipated length of stay of greater than 2 midnights secondary to cellulitis.    History of Present Illness   Chief Complaint: right leg swelling and redness worsening over the last 2 weeks    Jose Elias Mcgrath is a 69 y.o. male with a PMH of tobacco abuse, s/p primary angioplasy with coronary stent, hyperlipidemia, COPD, BPH, HTN, eczema recently started on keflex and methotrexate outpatient who presents with worsening right foot swelling, redness, drainage over the last 2 weeks.  Patient states that he has been following with dermatology for what he thought was a fungal infection for the last year.  States that he was on a prolonged course of Diflucan which he thought helped clear this up some however states in the last 2 weeks he went to go visit dermatologist again and was started on Keflex and methotrexate, noticed that he has been having increased redness, swelling, drainage from the right foot since then.  No complaints of fevers or chills.  No nausea, vomiting, diarrhea, constipation, chest pain, shortness of breath.  Only complaint at this time is that his right leg is more swollen than his left leg and that he is having more redness in the right leg than the left leg. States that he is not taking the methotrexate and only started taking the keflex for around 4 days because he was unable to get to the pharmacy to pick it up sooner. Has been trying feet soaks as well and has not noticed any improvement in the appearance of his feet since then.     Review of  Systems   Constitutional:  Negative for activity change, appetite change, chills, fatigue and fever.   HENT: Negative.     Eyes: Negative.    Respiratory: Negative.     Cardiovascular:  Positive for leg swelling. Negative for chest pain and palpitations.   Gastrointestinal: Negative.    Endocrine: Negative.    Genitourinary: Negative.    Musculoskeletal:  Positive for joint swelling. Negative for arthralgias, gait problem, myalgias and neck stiffness.   Skin:  Positive for color change, rash and wound.   Allergic/Immunologic: Negative.    Neurological: Negative.    Hematological: Negative.    Psychiatric/Behavioral: Negative.         I have reviewed the patient's PMH, PSH, Social History, Family History, Meds, and Allergies  Social History:  Marital Status:    Occupation:   Patient Pre-hospital Living Situation: Home  Patient Pre-hospital Level of Mobility: unable to be assessed at time of evaluation  Patient Pre-hospital Diet Restrictions:     Objective     Vitals:   Blood Pressure: (!) 164/107 (09/24/24 1424)  Pulse: 82 (09/24/24 1424)  Respirations: 18 (09/24/24 1424)  SpO2: 99 % (09/24/24 1424)    Physical Exam  Vitals reviewed.   Constitutional:       General: He is not in acute distress.     Appearance: He is ill-appearing. He is not toxic-appearing.   HENT:      Head: Normocephalic and atraumatic.      Mouth/Throat:      Mouth: Mucous membranes are moist.   Cardiovascular:      Rate and Rhythm: Normal rate and regular rhythm.      Heart sounds: No murmur heard.  Pulmonary:      Effort: No respiratory distress.      Breath sounds: No stridor. No wheezing, rhonchi or rales.      Comments: Saturating well on room air  Abdominal:      General: Bowel sounds are normal. There is no distension.      Palpations: Abdomen is soft. There is no mass.      Tenderness: There is no abdominal tenderness.   Musculoskeletal:      Right lower leg: Edema (more noticeable on the RLE than LLE) present.      Left lower leg:  Edema present.   Skin:     General: Skin is warm and dry.      Findings: Erythema (significant erythema noted on the right foot compared to the left foot, see media) present.      Comments: Crusting noted to bilateral feet, more pronounced on the RLE  Onychomycosis noted bilaterally   Neurological:      Mental Status: He is alert and oriented to person, place, and time.   Psychiatric:         Mood and Affect: Mood normal.         Behavior: Behavior normal.         Lines/Drains:  Lines/Drains/Airways       Active Status       None                        Additional Data:   Lab Results: I have reviewed the following results: CBC/BMP:   .     09/24/24  1515   WBC 10.36*   HGB 14.9   HCT 44.4      SODIUM 137   K 3.7      CO2 28   BUN 11   CREATININE 0.97   GLUC 86    , Procalcitonin:   Lab Results   Component Value Date    PROCALCITONI <0.05 09/24/2024     Results from last 7 days   Lab Units 09/24/24  1515   WBC Thousand/uL 10.36*   HEMOGLOBIN g/dL 14.9   HEMATOCRIT % 44.4   PLATELETS Thousands/uL 226   SEGS PCT % 75   LYMPHO PCT % 15   MONO PCT % 7   EOS PCT % 2     Results from last 7 days   Lab Units 09/24/24  1515   SODIUM mmol/L 137   POTASSIUM mmol/L 3.7   CHLORIDE mmol/L 103   CO2 mmol/L 28   BUN mg/dL 11   CREATININE mg/dL 0.97   ANION GAP mmol/L 6   CALCIUM mg/dL 9.2   GLUCOSE RANDOM mg/dL 86             Lab Results   Component Value Date    HGBA1C 6.1 (H) 08/21/2023    HGBA1C 5.9 (H) 02/20/2023    HGBA1C 6.2 (H) 05/05/2022     Results from last 7 days   Lab Units 09/24/24  1515   PROCALCITONIN ng/ml <0.05       Imaging Review: Reviewed radiology reports from this admission including: CT RLE.  Other Studies: No additional pertinent studies reviewed.    Administrative Statements   I have spent a total time of 64 minutes in caring for this patient on the day of the visit/encounter including Diagnostic results, Risks and benefits of tx options, Instructions for management, Patient and family  education, Counseling / Coordination of care, Documenting in the medical record, Reviewing / ordering tests, medicine, procedures  , and Obtaining or reviewing history  .    ** Please Note: This note has been constructed using a voice recognition system. **

## 2024-09-24 NOTE — ASSESSMENT & PLAN NOTE
History of angioplasty with coronary stent, follows with cardiology outpatient currently on aspirin 81 mg daily and brillinta 60 mg bid, zetia and crestor, will continue at this time  Continue outpatient cardiology follow up

## 2024-09-24 NOTE — ASSESSMENT & PLAN NOTE
Presented to ED with right foot swelling and drainage. States that he has been following with dermatology x 1 year and has been on diflucan previously for what he thought was fungal infection. Recently given keflex and diagnosed with eczema he said. States that he has been having worsening swelling, pain, and redness.   Outpatient therapy trialed with keflex and recently started on methotrexate for his eczema however he has not been taking the methotrexate.   Only started taking keflex around 4 days ago he said, but was prescribed on 9/11.   CT RLE: Extensive subcutaneous edema throughout the foot and ankle which could be cellulitis, without abscess formation. No evidence of osteomyelitis.  Procal normal  Was started on rocephin in the ED; will continue on ancef and vancomycin  Will obtain blood and wound culture  Give 1 dose lasix due to swelling, venous duplex, arterial duplex due to chronic nonhealing nature. Compression wrapping, elevation  Follow CBC and fever curve  ID consult    Lab Results   Component Value Date    WBC 10.36 (H) 09/24/2024    WBC 13.24 (H) 02/26/2021

## 2024-09-25 ENCOUNTER — APPOINTMENT (INPATIENT)
Dept: NON INVASIVE DIAGNOSTICS | Facility: HOSPITAL | Age: 69
DRG: 603 | End: 2024-09-25
Payer: MEDICARE

## 2024-09-25 PROBLEM — L03.115 CELLULITIS OF RIGHT LOWER EXTREMITY: Status: ACTIVE | Noted: 2024-09-24

## 2024-09-25 LAB
ALBUMIN SERPL BCG-MCNC: 3.8 G/DL (ref 3.5–5)
ALP SERPL-CCNC: 52 U/L (ref 34–104)
ALT SERPL W P-5'-P-CCNC: 13 U/L (ref 7–52)
ANION GAP SERPL CALCULATED.3IONS-SCNC: 7 MMOL/L (ref 4–13)
AST SERPL W P-5'-P-CCNC: 15 U/L (ref 13–39)
BILIRUB SERPL-MCNC: 0.55 MG/DL (ref 0.2–1)
BUN SERPL-MCNC: 13 MG/DL (ref 5–25)
CALCIUM SERPL-MCNC: 9.1 MG/DL (ref 8.4–10.2)
CHLORIDE SERPL-SCNC: 104 MMOL/L (ref 96–108)
CO2 SERPL-SCNC: 25 MMOL/L (ref 21–32)
CREAT SERPL-MCNC: 0.95 MG/DL (ref 0.6–1.3)
ERYTHROCYTE [DISTWIDTH] IN BLOOD BY AUTOMATED COUNT: 13.8 % (ref 11.6–15.1)
GFR SERPL CREATININE-BSD FRML MDRD: 80 ML/MIN/1.73SQ M
GLUCOSE SERPL-MCNC: 98 MG/DL (ref 65–140)
HCT VFR BLD AUTO: 43.5 % (ref 36.5–49.3)
HGB BLD-MCNC: 14.5 G/DL (ref 12–17)
MAGNESIUM SERPL-MCNC: 2.2 MG/DL (ref 1.9–2.7)
MCH RBC QN AUTO: 31 PG (ref 26.8–34.3)
MCHC RBC AUTO-ENTMCNC: 33.3 G/DL (ref 31.4–37.4)
MCV RBC AUTO: 93 FL (ref 82–98)
PLATELET # BLD AUTO: 219 THOUSANDS/UL (ref 149–390)
PMV BLD AUTO: 9 FL (ref 8.9–12.7)
POTASSIUM SERPL-SCNC: 3.6 MMOL/L (ref 3.5–5.3)
PROCALCITONIN SERPL-MCNC: <0.05 NG/ML
PROT SERPL-MCNC: 7 G/DL (ref 6.4–8.4)
RBC # BLD AUTO: 4.68 MILLION/UL (ref 3.88–5.62)
SODIUM SERPL-SCNC: 136 MMOL/L (ref 135–147)
WBC # BLD AUTO: 9.49 THOUSAND/UL (ref 4.31–10.16)

## 2024-09-25 PROCEDURE — 93925 LOWER EXTREMITY STUDY: CPT

## 2024-09-25 PROCEDURE — 93970 EXTREMITY STUDY: CPT | Performed by: STUDENT IN AN ORGANIZED HEALTH CARE EDUCATION/TRAINING PROGRAM

## 2024-09-25 PROCEDURE — 99232 SBSQ HOSP IP/OBS MODERATE 35: CPT

## 2024-09-25 PROCEDURE — 99255 IP/OBS CONSLTJ NEW/EST HI 80: CPT | Performed by: INTERNAL MEDICINE

## 2024-09-25 PROCEDURE — 85027 COMPLETE CBC AUTOMATED: CPT

## 2024-09-25 PROCEDURE — 93922 UPR/L XTREMITY ART 2 LEVELS: CPT | Performed by: SURGERY

## 2024-09-25 PROCEDURE — 93923 UPR/LXTR ART STDY 3+ LVLS: CPT

## 2024-09-25 PROCEDURE — 83735 ASSAY OF MAGNESIUM: CPT

## 2024-09-25 PROCEDURE — 80053 COMPREHEN METABOLIC PANEL: CPT

## 2024-09-25 PROCEDURE — 93925 LOWER EXTREMITY STUDY: CPT | Performed by: SURGERY

## 2024-09-25 PROCEDURE — 84145 PROCALCITONIN (PCT): CPT

## 2024-09-25 PROCEDURE — 93970 EXTREMITY STUDY: CPT

## 2024-09-25 RX ORDER — CEFAZOLIN SODIUM 2 G/50ML
2000 SOLUTION INTRAVENOUS EVERY 8 HOURS
Status: DISCONTINUED | OUTPATIENT
Start: 2024-09-25 | End: 2024-09-26

## 2024-09-25 RX ADMIN — Medication 200 MG: at 17:42

## 2024-09-25 RX ADMIN — CEFAZOLIN SODIUM 2000 MG: 2 SOLUTION INTRAVENOUS at 21:45

## 2024-09-25 RX ADMIN — ASPIRIN 81 MG 81 MG: 81 TABLET ORAL at 08:41

## 2024-09-25 RX ADMIN — METOPROLOL SUCCINATE 25 MG: 25 TABLET, EXTENDED RELEASE ORAL at 08:41

## 2024-09-25 RX ADMIN — TAMSULOSIN HYDROCHLORIDE 0.4 MG: 0.4 CAPSULE ORAL at 08:41

## 2024-09-25 RX ADMIN — CEFAZOLIN SODIUM 2000 MG: 2 SOLUTION INTRAVENOUS at 12:24

## 2024-09-25 RX ADMIN — EZETIMIBE 10 MG: 10 TABLET ORAL at 08:41

## 2024-09-25 RX ADMIN — NICOTINE 1 PATCH: 14 PATCH, EXTENDED RELEASE TRANSDERMAL at 08:42

## 2024-09-25 RX ADMIN — Medication 200 MG: at 08:41

## 2024-09-25 RX ADMIN — CEFAZOLIN SODIUM 1000 MG: 1 SOLUTION INTRAVENOUS at 03:03

## 2024-09-25 RX ADMIN — LOSARTAN POTASSIUM 25 MG: 25 TABLET, FILM COATED ORAL at 21:45

## 2024-09-25 NOTE — ASSESSMENT & PLAN NOTE
New onset for one year, affecting both hands and feet, confirmed on biopsy in June. Symptoms have failed to improve with topical steroids and has been treated with keflex for positive skin cx in the past. Recently started on methotrexate on 9/11. Is a risk factor for recurrent infection     -Recommend dermatology evaluation and consideration for repeat biopsy for routine and fungal c/s, plan to start methotrexate, consider allergy/immunology evaluation   -Check HIV screen

## 2024-09-25 NOTE — PROGRESS NOTES
Progress Note - Hospitalist   Name: Jose Elias Mcgrath 69 y.o. male I MRN: [id number]  Unit/Bed#: -[facility] I Date of Admission: 9/24/2024   Date of Service: 9/25/2024 I Hospital Day: 1    Assessment & Plan  Cellulitis  Presented to ED with right foot swelling and drainage. States that he has been following with dermatology x 1 year and has been on diflucan previously for what he thought was fungal infection. Recently given keflex and diagnosed with eczema he said. States that he has been having worsening swelling, pain, and redness.   Outpatient therapy trialed with keflex and recently started on methotrexate for his eczema however he has not been taking the methotrexate.   Only started taking keflex around 4 days ago he said, but was prescribed on 9/11.   CT RLE: Extensive subcutaneous edema throughout the foot and ankle which could be cellulitis, without abscess formation. No evidence of osteomyelitis.  Procal normal  Start ancef and vancomycin  Blood cx pending  Wound cx pending   Given 1 dose lasix due to swelling. Continue prn  venous duplex, arterial duplex due to chronic nonhealing nature - pending  Compression wrapping, elevation  ID consult  Chronic obstructive pulmonary disease (COPD) (HCC)  History of COPD, not currently in acute exacerbation, not on any maintenance medications for this  Benign prostatic hyperplasia with urinary obstruction  Continue flomax  Urinary retention protocol  Hyperlipidemia  Lipitor intolerance, currently on crestor 20 mg every other day and zetia 10 mg daily - continue  Status post primary angioplasty with coronary stent  History of angioplasty with coronary stent, follows with cardiology outpatient currently on aspirin 81 mg daily and brillinta 60 mg bid, zetia and crestor, will continue at this time  Continue outpatient cardiology follow up   Tobacco user  Encourage smoking cessation, nicotine patch ordered  Dyshidrotic eczema    Onychomycosis      VTE Pharmacologic  Prophylaxis: VTE Score: 5 High Risk (Score >/= 5) - Pharmacological DVT Prophylaxis Ordered: enoxaparin (Lovenox). Sequential Compression Devices Ordered.    Mobility:   Basic Mobility Inpatient Raw Score: 24  JH-HLM Goal: 8: Walk 250 feet or more  JH-HLM Achieved: 7: Walk 25 feet or more  JH-HLM Goal achieved. Continue to encourage appropriate mobility.    Patient Centered Rounds: I performed bedside rounds with nursing staff today.   Discussions with Specialists or Other Care Team Provider: nursing, cm    Education and Discussions with Family / Patient: Patient declined call to .     Current Length of Stay: 1 day(s)  Current Patient Status: Inpatient   Certification Statement: The patient will continue to require additional inpatient hospital stay due to requiring iv antibiotics for cellulitis and monitoring blood/wound cx  Discharge Plan: Anticipate discharge in 48-72 hrs to discharge location to be determined pending rehab evaluations.    Code Status: Level 1 - Full Code    Subjective   Patient states that the drainage in his feet has stopped since last night. Denies chest pain, shortness of breath or abdominal pain. Agreeable to current plan. Inquiring about antifungals and discussed that we will discuss with ID, monitor cultures and continue with antibiotics at this juncture.    Objective     Vitals:   Temp (24hrs), Av.6 °F (36.4 °C), Min:97.5 °F (36.4 °C), Max:97.7 °F (36.5 °C)    Temp:  [97.5 °F (36.4 °C)-97.7 °F (36.5 °C)] 97.7 °F (36.5 °C)  HR:  [74-84] 74  Resp:  [18] 18  BP: (100-164)/() 116/77  SpO2:  [97 %-99 %] 99 %  Body mass index is 26.57 kg/m².     Input and Output Summary (last 24 hours):   No intake or output data in the 24 hours ending 24 1002    Physical Exam  Vitals reviewed.   Constitutional:       General: He is not in acute distress.     Appearance: Normal appearance. He is not ill-appearing.      Comments: Poor dentition   HENT:      Head: Normocephalic and  atraumatic.      Nose: Nose normal.      Mouth/Throat:      Mouth: Mucous membranes are moist.      Pharynx: Oropharynx is clear.   Eyes:      Conjunctiva/sclera: Conjunctivae normal.   Cardiovascular:      Rate and Rhythm: Normal rate and regular rhythm.      Pulses: Normal pulses.      Heart sounds: Normal heart sounds. No murmur heard.  Pulmonary:      Effort: Pulmonary effort is normal. No respiratory distress.      Breath sounds: Normal breath sounds. No wheezing.   Abdominal:      General: Abdomen is flat. Bowel sounds are normal. There is no distension.      Palpations: Abdomen is soft.      Tenderness: There is no abdominal tenderness.   Musculoskeletal:         General: Normal range of motion.      Cervical back: Normal range of motion.      Right lower leg: Edema present.      Left lower leg: Edema present.      Comments: R>L   Skin:     General: Skin is warm.      Findings: Erythema present.      Comments: Erythema and foul smell to bilateral feet, R>L   Neurological:      General: No focal deficit present.      Mental Status: He is alert and oriented to person, place, and time. Mental status is at baseline.      Cranial Nerves: No cranial nerve deficit.      Motor: No weakness.   Psychiatric:         Mood and Affect: Mood normal.         Behavior: Behavior normal.         Thought Content: Thought content normal.         Judgment: Judgment normal.          Lines/Drains:  Lines/Drains/Airways       Active Status       None                            Lab Results: I have reviewed the following results:    Results from last 7 days   Lab Units 09/25/24  0450 09/24/24  1515   WBC Thousand/uL 9.49 10.36*   HEMOGLOBIN g/dL 14.5 14.9   HEMATOCRIT % 43.5 44.4   PLATELETS Thousands/uL 219 226   SEGS PCT %  --  75   LYMPHO PCT %  --  15   MONO PCT %  --  7   EOS PCT %  --  2     Results from last 7 days   Lab Units 09/25/24  0450   SODIUM mmol/L 136   POTASSIUM mmol/L 3.6   CHLORIDE mmol/L 104   CO2 mmol/L 25   BUN  mg/dL 13   CREATININE mg/dL 0.95   ANION GAP mmol/L 7   CALCIUM mg/dL 9.1   ALBUMIN g/dL 3.8   TOTAL BILIRUBIN mg/dL 0.55   ALK PHOS U/L 52   ALT U/L 13   AST U/L 15   GLUCOSE RANDOM mg/dL 98             Results from last 7 days   Lab Units 09/24/24  1515   HEMOGLOBIN A1C % 5.9*     Results from last 7 days   Lab Units 09/25/24  0450 09/24/24  1515   PROCALCITONIN ng/ml <0.05 <0.05       Recent Cultures (last 7 days):   Results from last 7 days   Lab Units 09/24/24  1555 09/24/24  1515   BLOOD CULTURE  Received in Microbiology Lab. Culture in Progress. Received in Microbiology Lab. Culture in Progress.       Imaging Review: Reviewed radiology reports from this admission including: CT RLE.  Other Studies: No additional pertinent studies reviewed.    Last 24 Hours Medication List:     Current Facility-Administered Medications:     aspirin chewable tablet 81 mg, Daily    ceFAZolin (ANCEF) IVPB (premix in dextrose) 1,000 mg 50 mL, Q8H, Last Rate: 1,000 mg (09/25/24 0303)    co-enzyme Q-10 capsule 200 mg, BID    enoxaparin (LOVENOX) subcutaneous injection 40 mg, Daily    ezetimibe (ZETIA) tablet 10 mg, Daily    losartan (COZAAR) tablet 25 mg, HS    metoprolol succinate (TOPROL-XL) 24 hr tablet 25 mg, Daily    nicotine (NICODERM CQ) 14 mg/24hr TD 24 hr patch 1 patch, Daily    tamsulosin (FLOMAX) capsule 0.4 mg, QAM    ticagrelor tablet 60 mg, BID    vancomycin (VANCOCIN) IVPB (premix in dextrose) 750 mg 150 mL, Q12H, Last Rate: 750 mg (09/24/24 2110)    Administrative Statements   Today, Patient Was Seen By: Romelia Gabriel PA-C      **Please Note: This note may have been constructed using a voice recognition system.**

## 2024-09-25 NOTE — ASSESSMENT & PLAN NOTE
Presented to ED with right foot swelling and drainage. States that he has been following with dermatology x 1 year and has been on diflucan previously for what he thought was fungal infection. Recently given keflex and diagnosed with eczema he said. States that he has been having worsening swelling, pain, and redness.   Outpatient therapy trialed with keflex and recently started on methotrexate for his eczema however he has not been taking the methotrexate.   Only started taking keflex around 4 days ago he said, but was prescribed on 9/11.   CT RLE: Extensive subcutaneous edema throughout the foot and ankle which could be cellulitis, without abscess formation. No evidence of osteomyelitis.  Procal normal  Cefepime  Blood cx pending  Wound cx pending   Given 1 dose lasix due to swelling. Continue prn  venous duplex negative for DVT  Compression wrapping, elevation  ID consult -switch to cefepime and monitor cultures and MRSA. Recommend HIV screening and derm eval  Patient refusing HIV screening  Will d/w derm if able to do tele consult

## 2024-09-25 NOTE — ASSESSMENT & PLAN NOTE
Presented to ED with right foot swelling and drainage. States that he has been following with dermatology x 1 year and has been on diflucan previously for what he thought was fungal infection. Recently given keflex and diagnosed with eczema he said. States that he has been having worsening swelling, pain, and redness.   Outpatient therapy trialed with keflex and recently started on methotrexate for his eczema however he has not been taking the methotrexate.   Only started taking keflex around 4 days ago he said, but was prescribed on 9/11.   CT RLE: Extensive subcutaneous edema throughout the foot and ankle which could be cellulitis, without abscess formation. No evidence of osteomyelitis.  Procal normal  Start ancef and vancomycin  Blood cx pending  Wound cx pending   Given 1 dose lasix due to swelling. Continue prn  venous duplex, arterial duplex due to chronic nonhealing nature - pending  Compression wrapping, elevation  ID consult

## 2024-09-25 NOTE — CONSULTS
Consultation - Infectious Disease   Name: Jose Elias Mcgrath 69 y.o. male I MRN: [id number]  Unit/Bed#: -01 I Date of Admission: 9/24/2024   Date of Service: 9/25/2024 I Hospital Day: 1   Inpatient consult to Infectious Diseases  Consult performed by: Mare Jovel MD  Consult ordered by: Ashley Mercado PA-C        Physician Requesting Evaluation: Agnieszka Chu MD   Reason for Evaluation / Principal Problem: Cellulitis        VIRTUAL CARE DOCUMENTATION:     1. This service was provided via Telemedicine using [employer]     2. Parties in the room with patient during teleconsult Patient only    3. Confidentiality My office door was closed     4. Participants No one else was in the room    5. Patient acknowledged consent and understanding of privacy and security of the  Telemedicine consult. I informed the patient that I have reviewed their record in Epic and presented the opportunity for them to ask any questions regarding the visit today.  The patient agreed to participate.    6. Time spent 19 minutes     Assessment & Plan  Cellulitis of right lower extremity  In the setting of severe eczema of foot and onychomycosis. Suspect strep, staph, no previous hx MRSA. CT is without abscess. Recent LEADs wnl. Had been taking keflex prior to presentation but progression of symptoms may be secondary to edema. Patient is systemically well, afebrile without leukocytosis     -Continue cefazolin adjust dose to 2 q8h   -Discontinue vancomycin  -FU blood, wound cx and MRSA nasal culture   -Continue supportive care, monitor clinical course   -Serial extremity exams  -Check daily CBC, CMP while inpatient to monitor for any evolving antibiotic toxicity or treatment failure    Dyshidrotic eczema  New onset for one year, affecting both hands and feet, confirmed on biopsy in June. Symptoms have failed to improve with topical steroids and has been treated with keflex for positive skin cx in the past. Recently started  on methotrexate on 9/11. Is a risk factor for recurrent infection     -Recommend dermatology evaluation and consideration for repeat biopsy for routine and fungal c/s, plan to start methotrexate, consider allergy/immunology evaluation   -Check HIV screen  Onychomycosis  Risk factor for infection, has completed course of terbinafine and pulsed fluconazole     -FU per dermatology and nail trimming  Tobacco user     -Recommend tobacco cessation counseling      Reviewed recommendations to continue iv abx and fu cultures with primary team who is in agreement. Will follow.  Please call with concerns or any changes in clinical status or new test results.      HPI: Jose Elias Mcgrath is a 69 y.o. year old male with BPH, CAD.  He states that about a year ago he developed redness and scaling over his left foot and was treated for tinea pedis with 3 months of terbinafine with improvement. During this time he recalls an itchy peeling rash over his hands. He was diagnosed with dyshidrotic eczema and onychomycosis and is being followed by [facility] dermatology. He was subsequently switched to weekly diflucan, prescribed ketoconazole shampoo soaks and steroid ointment. Due to persistent itchy and scaly rash over his hands and feet he underwent a skin biopsy in June . Path was positive for eczema and skin cx were positive (results n/a) for which he was treated with keflex and topical steroids were prescribed. Due to persistent symptoms he was started on methotrexate on 9/11 but he is waiting to start it. A couple of days later he began to note worsening right foot swelling, redness and pain.  He presented to the ER on 9/24 wand was started on vanc and cefazolin.   Infectious disease is being consulted for diagnostic work up and antibiotic management.    Review of Systems  Pertinent positives and negatives as noted in HPI. Rest complete 12 point system-based review of systems is otherwise negative.    PAST MEDICAL HISTORY:  Past  Medical History:   Diagnosis Date    BPH (benign prostatic hyperplasia)     CAD (coronary artery disease)     Enlarged prostate     Hyperlipidemia     Vitamin D deficiency      Past Surgical History:   Procedure Laterality Date    COLONOSCOPY      CORONARY ANGIOPLASTY Left 2021    with heart cath    INGUINAL HERNIA REPAIR  2017    TONSILECTOMY AND ADNOIDECTOMY         FAMILY HISTORY:  Non-contributory    SOCIAL HISTORY:  Social History     Social History     Substance and Sexual Activity   Alcohol Use Not Currently     Social History     Substance and Sexual Activity   Drug Use Never     Social History     Tobacco Use   Smoking Status Every Day    Current packs/day: 1.00    Types: Cigarettes   Smokeless Tobacco Never       ALLERGIES:  No Known Allergies    MEDICATIONS:  All current active medications have been reviewed.    Physical Exam     Temp:  [97.5 °F (36.4 °C)-97.7 °F (36.5 °C)] 97.7 °F (36.5 °C)  HR:  [74-84] 74  Resp:  [18] 18  BP: (100-164)/() 116/77  SpO2:  [97 %-99 %] 99 %  Temp (24hrs), Av.6 °F (36.4 °C), Min:97.5 °F (36.4 °C), Max:97.7 °F (36.5 °C)  Current: Temperature: 97.7 °F (36.5 °C)  No intake or output data in the 24 hours ending 24 1024      Physical exam findings reported by bedside and primary medical team staff    General Appearance:  Appearing well, nontoxic, and in no distress, appears stated age   Lungs:   Diminished to auscultation bilaterally, no audible wheezes, rhonchi and rales, respirations unlabored   Chest Wall:  No tenderness or deformity   Heart:  Regular rate and rhythm, S1, S2 normal, no murmur, rub or gallop   Abdomen:   Soft, non-tender, non-distended, positive bowel sounds, no masses, no organomegaly    No CVA tenderness   Extremities: Extremities normal, atraumatic, no cyanosis, clubbing , R foot swelling   Skin: Scaly plaque like erythematous rash with desquamation over both feet and hands, erythema, warmth and tenderness over right  foot   Neurologic: Alert and oriented times 3       Invasive Devices:   Peripheral IV 09/24/24 Right Antecubital (Active)   Site Assessment WDL 09/24/24 1516   Dressing Type Transparent 09/24/24 1516   Line Status Blood return noted;Flushed;Saline locked 09/24/24 1516   Dressing Status Clean;Dry;Intact 09/24/24 1516       Labs, Imaging, & Other Studies     Lab Results:    I have personally reviewed pertinent labs.    Results from last 7 days   Lab Units 09/25/24  0450 09/24/24  1515   WBC Thousand/uL 9.49 10.36*   HEMOGLOBIN g/dL 14.5 14.9   PLATELETS Thousands/uL 219 226     Results from last 7 days   Lab Units 09/25/24  0450   POTASSIUM mmol/L 3.6   CHLORIDE mmol/L 104   CO2 mmol/L 25   BUN mg/dL 13   CREATININE mg/dL 0.95   EGFR ml/min/1.73sq m 80   CALCIUM mg/dL 9.1   AST U/L 15   ALT U/L 13   ALK PHOS U/L 52     Results from last 7 days   Lab Units 09/24/24  1555 09/24/24  1515   BLOOD CULTURE  Received in Microbiology Lab. Culture in Progress. Received in Microbiology Lab. Culture in Progress.       Imaging Studies:   I have personally reviewed pertinent imaging study reports and images in PACS.      EKG, Pathology, and Other Studies:   I have personally reviewed pertinent reports and reviewed external records.    Counseling/Coordination of care:       Total 90 minutes in evaluation of the patient and communication with the patient via telehealth of which 50 minutes was in counseling/coordination of care.  Extensive review of the medical records in epic including review of the notes, radiographs, and laboratory results.  My recommendations were discussed with the patient in detail who verbalized understanding.

## 2024-09-25 NOTE — PROGRESS NOTES
Jose Elias Mcgrath is a 69 y.o. male who is currently ordered Vancomycin IV with management by the Pharmacy Consult service.  Relevant clinical data and objective / subjective history reviewed.  Vancomycin Assessment:  Indication and Goal AUC/Trough: Soft tissue (goal -600, trough >10)  Clinical Status: stable  Micro:   Cultures pending  Renal Function:  SCr: 0.95 mg/dL  CrCl: 71.3 mL/min  Renal replacement: Not on dialysis  Days of Therapy: 2  Current Dose: 750 mg IV Q12H  Vancomycin Plan:  New Dosing: continue 750 mg IV Q12H  Estimated AUC: 430 mcg*hr/mL  Estimated Trough: 12.9 mcg/mL  Next Level: 9/26/24 at 0600  Renal Function Monitoring: Daily BMP and UOP  Pharmacy will continue to follow closely for s/sx of nephrotoxicity, infusion reactions and appropriateness of therapy.  BMP and CBC will be ordered per protocol. We will continue to follow the patient’s culture results and clinical progress daily.    Vy Vogel, Pharmacist

## 2024-09-25 NOTE — PLAN OF CARE
Problem: PAIN - ADULT  Goal: Verbalizes/displays adequate comfort level or baseline comfort level  Description: Interventions:  - Encourage patient to monitor pain and request assistance  - Assess pain using appropriate pain scale  - Administer analgesics based on type and severity of pain and evaluate response  - Implement non-pharmacological measures as appropriate and evaluate response  - Consider cultural and social influences on pain and pain management  - Notify physician/advanced practitioner if interventions unsuccessful or patient reports new pain  Outcome: Progressing     Problem: INFECTION - ADULT  Goal: Absence or prevention of progression during hospitalization  Description: INTERVENTIONS:  - Assess and monitor for signs and symptoms of infection  - Monitor lab/diagnostic results  - Monitor all insertion sites, i.e. indwelling lines, tubes, and drains  - Monitor endotracheal if appropriate and nasal secretions for changes in amount and color  - [place] appropriate cooling/warming therapies per order  - Administer medications as ordered  - Instruct and encourage patient and family to use good hand hygiene technique  - Identify and instruct in appropriate isolation precautions for identified infection/condition  Outcome: Progressing  Goal: Absence of fever/infection during neutropenic period  Description: INTERVENTIONS:  - Monitor WBC    Outcome: Progressing     Problem: SAFETY ADULT  Goal: Patient will remain free of falls  Description: INTERVENTIONS:  - Educate patient/family on patient safety including physical limitations  - Instruct patient to call for assistance with activity   - Consult OT/PT to assist with strengthening/mobility   - Keep Call bell within reach  - Keep bed low and locked with side rails adjusted as appropriate  - Keep care items and personal belongings within reach  - Initiate and maintain comfort rounds  - Make Fall Risk Sign visible to staff  - Offer Toileting every 2 Hours,  in advance of need  - Initiate/Maintain bed alarm  - Obtain necessary fall risk management equipment: socks, bracelet  - Apply yellow socks and bracelet for high fall risk patients  - Consider moving patient to room near nurses station  Outcome: Progressing  Goal: Maintain or return to baseline ADL function  Description: INTERVENTIONS:  -  Assess patient's ability to carry out ADLs; assess patient's baseline for ADL function and identify physical deficits which impact ability to perform ADLs (bathing, care of mouth/teeth, toileting, grooming, dressing, etc.)  - Assess/evaluate cause of self-care deficits   - Assess range of motion  - Assess patient's mobility; develop plan if impaired  - Assess patient's need for assistive devices and provide as appropriate  - Encourage maximum independence but intervene and supervise when necessary  - Involve family in performance of ADLs  - Assess for home care needs following discharge   - Consider OT consult to assist with ADL evaluation and planning for discharge  - Provide patient education as appropriate  Outcome: Progressing  Goal: Maintains/Returns to pre admission functional level  Description: INTERVENTIONS:  - Perform AM-PAC 6 Click Basic Mobility/ Daily Activity assessment daily.  - Set and communicate daily mobility goal to care team and patient/family/caregiver.   - Collaborate with rehabilitation services on mobility goals if consulted  - Perform Range of Motion 3 times a day.  - Reposition patient every 2 hours.  - Dangle patient 2 times a day  - Stand patient 2 times a day  - Ambulate patient 2 times a day  - Out of bed to chair 2 times a day   - Out of bed for meals 2 times a day  - Out of bed for toileting  - Record patient progress and toleration of activity level   Outcome: Progressing     Problem: DISCHARGE PLANNING  Goal: Discharge to home or other facility with appropriate resources  Description: INTERVENTIONS:  - Identify barriers to discharge w/patient and  caregiver  - Arrange for needed discharge resources and transportation as appropriate  - Identify discharge learning needs (meds, wound care, etc.)  - Arrange for interpretive services to assist at discharge as needed  - Refer to Case Management Department for coordinating discharge planning if the patient needs post-hospital services based on physician/advanced practitioner order or complex needs related to functional status, cognitive ability, or social support system  Outcome: Progressing     Problem: Knowledge Deficit  Goal: Patient/family/caregiver demonstrates understanding of disease process, treatment plan, medications, and discharge instructions  Description: Complete learning assessment and assess knowledge base.  Interventions:  - Provide teaching at level of understanding  - Provide teaching via preferred learning methods  Outcome: Progressing

## 2024-09-25 NOTE — ASSESSMENT & PLAN NOTE
In the setting of severe eczema of foot and onychomycosis. Suspect strep, staph, no previous hx MRSA. CT is without abscess. Recent LEADs wnl. Had been taking keflex prior to presentation but progression of symptoms may be secondary to edema. Patient is systemically well, afebrile without leukocytosis     -Continue cefazolin adjust dose to 2 q8h   -Discontinue vancomycin  -FU blood, wound cx and MRSA nasal culture   -Continue supportive care, monitor clinical course   -Serial extremity exams  -Check daily CBC, CMP while inpatient to monitor for any evolving antibiotic toxicity or treatment failure

## 2024-09-25 NOTE — PLAN OF CARE
Problem: PAIN - ADULT  Goal: Verbalizes/displays adequate comfort level or baseline comfort level  Description: Interventions:  - Encourage patient to monitor pain and request assistance  - Assess pain using appropriate pain scale  - Administer analgesics based on type and severity of pain and evaluate response  - Implement non-pharmacological measures as appropriate and evaluate response  - Consider cultural and social influences on pain and pain management  - Notify physician/advanced practitioner if interventions unsuccessful or patient reports new pain  Outcome: Progressing     Problem: INFECTION - ADULT  Goal: Absence or prevention of progression during hospitalization  Description: INTERVENTIONS:  - Assess and monitor for signs and symptoms of infection  - Monitor lab/diagnostic results  - Monitor all insertion sites, i.e. indwelling lines, tubes, and drains  - Monitor endotracheal if appropriate and nasal secretions for changes in amount and color  - [place] appropriate cooling/warming therapies per order  - Administer medications as ordered  - Instruct and encourage patient and family to use good hand hygiene technique  - Identify and instruct in appropriate isolation precautions for identified infection/condition  Outcome: Progressing  Goal: Absence of fever/infection during neutropenic period  Description: INTERVENTIONS:  - Monitor WBC    Outcome: Progressing     Problem: SAFETY ADULT  Goal: Patient will remain free of falls  Description: INTERVENTIONS:  - Educate patient/family on patient safety including physical limitations  - Instruct patient to call for assistance with activity   - Consult OT/PT to assist with strengthening/mobility   - Keep Call bell within reach  - Keep bed low and locked with side rails adjusted as appropriate  - Keep care items and personal belongings within reach  - Initiate and maintain comfort rounds  - Make Fall Risk Sign visible to staff  - Offer Toileting every 2 Hours,  in advance of need  - Initiate/Maintain bed alarm  - Obtain necessary fall risk management equipment: socks  - Apply yellow socks and bracelet for high fall risk patients  - Consider moving patient to room near nurses station  Outcome: Progressing  Goal: Maintain or return to baseline ADL function  Description: INTERVENTIONS:  -  Assess patient's ability to carry out ADLs; assess patient's baseline for ADL function and identify physical deficits which impact ability to perform ADLs (bathing, care of mouth/teeth, toileting, grooming, dressing, etc.)  - Assess/evaluate cause of self-care deficits   - Assess range of motion  - Assess patient's mobility; develop plan if impaired  - Assess patient's need for assistive devices and provide as appropriate  - Encourage maximum independence but intervene and supervise when necessary  - Involve family in performance of ADLs  - Assess for home care needs following discharge   - Consider OT consult to assist with ADL evaluation and planning for discharge  - Provide patient education as appropriate  Outcome: Progressing  Goal: Maintains/Returns to pre admission functional level  Description: INTERVENTIONS:  - Perform AM-PAC 6 Click Basic Mobility/ Daily Activity assessment daily.  - Set and communicate daily mobility goal to care team and patient/family/caregiver.   - Collaborate with rehabilitation services on mobility goals if consulted  - Perform Range of Motion 2 times a day.  - Reposition patient every 2 hours.  - Dangle patient 2 times a day  - Stand patient 2 times a day  - Ambulate patient 2 times a day  - Out of bed to chair 2 times a day   - Out of bed for meals 2 times a day  - Out of bed for toileting  - Record patient progress and toleration of activity level   Outcome: Progressing     Problem: DISCHARGE PLANNING  Goal: Discharge to home or other facility with appropriate resources  Description: INTERVENTIONS:  - Identify barriers to discharge w/patient and  caregiver  - Arrange for needed discharge resources and transportation as appropriate  - Identify discharge learning needs (meds, wound care, etc.)  - Arrange for interpretive services to assist at discharge as needed  - Refer to Case Management Department for coordinating discharge planning if the patient needs post-hospital services based on physician/advanced practitioner order or complex needs related to functional status, cognitive ability, or social support system  Outcome: Progressing     Problem: Knowledge Deficit  Goal: Patient/family/caregiver demonstrates understanding of disease process, treatment plan, medications, and discharge instructions  Description: Complete learning assessment and assess knowledge base.  Interventions:  - Provide teaching at level of understanding  - Provide teaching via preferred learning methods  Outcome: Progressing

## 2024-09-25 NOTE — PROGRESS NOTES
Jose Elias Mcgrath is a 69 y.o. male who is currently ordered Vancomycin IV with management by the Pharmacy Consult service.  Relevant clinical data and objective / subjective history reviewed.  Vancomycin Assessment:  Indication and Goal AUC/Trough: Soft tissue (goal -600, trough >10)  Clinical Status:  new start  Micro:     Renal Function:  SCr: 0.97 mg/dL  CrCl: 69.8 mL/min  Renal replacement: Not on dialysis  Days of Therapy: 1  Current Dose: new start  Vancomycin Plan:  New Dosin mg IV q 12 hours  Estimated AUC: 438 mcg*hr/mL  Estimated Trough: 13.2 mcg/mL  Next Level: 24 at 0600  Renal Function Monitoring: Daily BMP and UOP  Pharmacy will continue to follow closely for s/sx of nephrotoxicity, infusion reactions and appropriateness of therapy.  BMP and CBC will be ordered per protocol. We will continue to follow the patient’s culture results and clinical progress daily.    Petty Young, Pharmacist

## 2024-09-25 NOTE — CONSULTS
Vancomycin IV Pharmacy-to-Dose Consultation     Vancomycin has been discontinued.  Pharmacy will sign off.  Please contact or re-consult with questions.    Vy Vogel, Pharmacist

## 2024-09-25 NOTE — ASSESSMENT & PLAN NOTE
Risk factor for infection, has completed course of terbinafine and pulsed fluconazole     -FU per dermatology and nail trimming

## 2024-09-26 ENCOUNTER — APPOINTMENT (INPATIENT)
Dept: CT IMAGING | Facility: HOSPITAL | Age: 69
DRG: 603 | End: 2024-09-26
Payer: MEDICARE

## 2024-09-26 PROBLEM — I73.9 PERIPHERAL ARTERIAL DISEASE (HCC): Status: ACTIVE | Noted: 2024-09-26

## 2024-09-26 LAB
ANION GAP SERPL CALCULATED.3IONS-SCNC: 8 MMOL/L (ref 4–13)
APTT PPP: 27 SECONDS (ref 23–34)
APTT PPP: 45 SECONDS (ref 23–34)
BUN SERPL-MCNC: 12 MG/DL (ref 5–25)
CALCIUM SERPL-MCNC: 8.4 MG/DL (ref 8.4–10.2)
CHLORIDE SERPL-SCNC: 105 MMOL/L (ref 96–108)
CO2 SERPL-SCNC: 22 MMOL/L (ref 21–32)
CREAT SERPL-MCNC: 0.93 MG/DL (ref 0.6–1.3)
ERYTHROCYTE [DISTWIDTH] IN BLOOD BY AUTOMATED COUNT: 13.9 % (ref 11.6–15.1)
GFR SERPL CREATININE-BSD FRML MDRD: 82 ML/MIN/1.73SQ M
GLUCOSE SERPL-MCNC: 106 MG/DL (ref 65–140)
HCT VFR BLD AUTO: 39.2 % (ref 36.5–49.3)
HGB BLD-MCNC: 13.2 G/DL (ref 12–17)
INR PPP: 0.99 (ref 0.85–1.19)
MCH RBC QN AUTO: 31.1 PG (ref 26.8–34.3)
MCHC RBC AUTO-ENTMCNC: 33.7 G/DL (ref 31.4–37.4)
MCV RBC AUTO: 92 FL (ref 82–98)
MRSA NOSE QL CULT: NORMAL
PLATELET # BLD AUTO: 205 THOUSANDS/UL (ref 149–390)
PMV BLD AUTO: 9.2 FL (ref 8.9–12.7)
POTASSIUM SERPL-SCNC: 3.4 MMOL/L (ref 3.5–5.3)
PROTHROMBIN TIME: 13.5 SECONDS (ref 12.3–15)
RBC # BLD AUTO: 4.25 MILLION/UL (ref 3.88–5.62)
SODIUM SERPL-SCNC: 135 MMOL/L (ref 135–147)
WBC # BLD AUTO: 6.74 THOUSAND/UL (ref 4.31–10.16)

## 2024-09-26 PROCEDURE — 99232 SBSQ HOSP IP/OBS MODERATE 35: CPT

## 2024-09-26 PROCEDURE — 85730 THROMBOPLASTIN TIME PARTIAL: CPT

## 2024-09-26 PROCEDURE — 99233 SBSQ HOSP IP/OBS HIGH 50: CPT | Performed by: INTERNAL MEDICINE

## 2024-09-26 PROCEDURE — 85027 COMPLETE CBC AUTOMATED: CPT

## 2024-09-26 PROCEDURE — 80048 BASIC METABOLIC PNL TOTAL CA: CPT

## 2024-09-26 PROCEDURE — NC001 PR NO CHARGE: Performed by: DERMATOLOGY

## 2024-09-26 PROCEDURE — 75635 CT ANGIO ABDOMINAL ARTERIES: CPT

## 2024-09-26 PROCEDURE — 99448 NTRPROF PH1/NTRNET/EHR 21-30: CPT | Performed by: NURSE PRACTITIONER

## 2024-09-26 PROCEDURE — 85610 PROTHROMBIN TIME: CPT

## 2024-09-26 RX ORDER — POTASSIUM CHLORIDE 1500 MG/1
40 TABLET, EXTENDED RELEASE ORAL ONCE
Status: COMPLETED | OUTPATIENT
Start: 2024-09-26 | End: 2024-09-26

## 2024-09-26 RX ORDER — CLOPIDOGREL BISULFATE 75 MG/1
75 TABLET ORAL DAILY
Status: DISCONTINUED | OUTPATIENT
Start: 2024-09-27 | End: 2024-09-30 | Stop reason: HOSPADM

## 2024-09-26 RX ORDER — CEFEPIME HYDROCHLORIDE 2 G/50ML
2000 INJECTION, SOLUTION INTRAVENOUS EVERY 8 HOURS
Status: DISCONTINUED | OUTPATIENT
Start: 2024-09-26 | End: 2024-09-30 | Stop reason: HOSPADM

## 2024-09-26 RX ORDER — HEPARIN SODIUM 10000 [USP'U]/100ML
3-20 INJECTION, SOLUTION INTRAVENOUS
Status: DISCONTINUED | OUTPATIENT
Start: 2024-09-26 | End: 2024-09-30 | Stop reason: HOSPADM

## 2024-09-26 RX ADMIN — TAMSULOSIN HYDROCHLORIDE 0.4 MG: 0.4 CAPSULE ORAL at 09:00

## 2024-09-26 RX ADMIN — CEFAZOLIN SODIUM 2000 MG: 2 SOLUTION INTRAVENOUS at 03:49

## 2024-09-26 RX ADMIN — Medication 200 MG: at 09:00

## 2024-09-26 RX ADMIN — POTASSIUM CHLORIDE 40 MEQ: 1500 TABLET, EXTENDED RELEASE ORAL at 09:35

## 2024-09-26 RX ADMIN — CEFEPIME HYDROCHLORIDE 2000 MG: 2 INJECTION, SOLUTION INTRAVENOUS at 18:21

## 2024-09-26 RX ADMIN — LOSARTAN POTASSIUM 25 MG: 25 TABLET, FILM COATED ORAL at 21:12

## 2024-09-26 RX ADMIN — ASPIRIN 81 MG 81 MG: 81 TABLET ORAL at 09:00

## 2024-09-26 RX ADMIN — CEFEPIME HYDROCHLORIDE 2000 MG: 2 INJECTION, SOLUTION INTRAVENOUS at 11:14

## 2024-09-26 RX ADMIN — METOPROLOL SUCCINATE 25 MG: 25 TABLET, EXTENDED RELEASE ORAL at 09:00

## 2024-09-26 RX ADMIN — IOHEXOL 100 ML: 350 INJECTION, SOLUTION INTRAVENOUS at 14:28

## 2024-09-26 RX ADMIN — NICOTINE 1 PATCH: 14 PATCH, EXTENDED RELEASE TRANSDERMAL at 09:00

## 2024-09-26 RX ADMIN — HEPARIN SODIUM 12 UNITS/KG/HR: 10000 INJECTION, SOLUTION INTRAVENOUS at 14:50

## 2024-09-26 RX ADMIN — EZETIMIBE 10 MG: 10 TABLET ORAL at 09:00

## 2024-09-26 RX ADMIN — Medication 200 MG: at 18:21

## 2024-09-26 NOTE — ASSESSMENT & PLAN NOTE
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  Evidence of high-grade stenosis versus occlusion and posterior tibial arteries  Vascular consulted - CTA abd with runoff.  Urgent outpatient follow up on discharge  CT abdomen with runoff pending

## 2024-09-26 NOTE — ASSESSMENT & PLAN NOTE
New onset for one year, affecting both hands and feet, confirmed on biopsy in June. Symptoms have failed to improve with topical steroids and has been treated with keflex for positive skin cx in the past. Recently started on methotrexate on 9/11. Is a risk factor for recurrent infection     -Recommend dermatology evaluation and consideration for repeat biopsy for routine and fungal c/s, plan to start methotrexate, consider allergy/immunology evaluation as outpatient   -Check HIV screen, patient is declining at present

## 2024-09-26 NOTE — PLAN OF CARE
Problem: PAIN - ADULT  Goal: Verbalizes/displays adequate comfort level or baseline comfort level  Description: Interventions:  - Encourage patient to monitor pain and request assistance  - Assess pain using appropriate pain scale  - Administer analgesics based on type and severity of pain and evaluate response  - Implement non-pharmacological measures as appropriate and evaluate response  - Consider cultural and social influences on pain and pain management  - Notify physician/advanced practitioner if interventions unsuccessful or patient reports new pain  Outcome: Progressing     Problem: INFECTION - ADULT  Goal: Absence or prevention of progression during hospitalization  Description: INTERVENTIONS:  - Assess and monitor for signs and symptoms of infection  - Monitor lab/diagnostic results  - Monitor all insertion sites, i.e. indwelling lines, tubes, and drains  - Monitor endotracheal if appropriate and nasal secretions for changes in amount and color  - [place] appropriate cooling/warming therapies per order  - Administer medications as ordered  - Instruct and encourage patient and family to use good hand hygiene technique  - Identify and instruct in appropriate isolation precautions for identified infection/condition  Outcome: Progressing  Goal: Absence of fever/infection during neutropenic period  Description: INTERVENTIONS:  - Monitor WBC    Outcome: Progressing     Problem: SAFETY ADULT  Goal: Patient will remain free of falls  Description: INTERVENTIONS:  - Educate patient/family on patient safety including physical limitations  - Instruct patient to call for assistance with activity   - Consult OT/PT to assist with strengthening/mobility   - Keep Call bell within reach  - Keep bed low and locked with side rails adjusted as appropriate  - Keep care items and personal belongings within reach  - Initiate and maintain comfort rounds  - Make Fall Risk Sign visible to staff  - Offer Toileting every 2 Hours,  in advance of need  - Initiate/Maintain bed alarm  - Obtain necessary fall risk management equipment: yellow bracelet, socks  - Apply yellow socks and bracelet for high fall risk patients  - Consider moving patient to room near nurses station  Outcome: Progressing  Goal: Maintain or return to baseline ADL function  Description: INTERVENTIONS:  -  Assess patient's ability to carry out ADLs; assess patient's baseline for ADL function and identify physical deficits which impact ability to perform ADLs (bathing, care of mouth/teeth, toileting, grooming, dressing, etc.)  - Assess/evaluate cause of self-care deficits   - Assess range of motion  - Assess patient's mobility; develop plan if impaired  - Assess patient's need for assistive devices and provide as appropriate  - Encourage maximum independence but intervene and supervise when necessary  - Involve family in performance of ADLs  - Assess for home care needs following discharge   - Consider OT consult to assist with ADL evaluation and planning for discharge  - Provide patient education as appropriate  Outcome: Progressing  Goal: Maintains/Returns to pre admission functional level  Description: INTERVENTIONS:  - Perform AM-PAC 6 Click Basic Mobility/ Daily Activity assessment daily.  - Set and communicate daily mobility goal to care team and patient/family/caregiver.   - Collaborate with rehabilitation services on mobility goals if consulted  - Perform Range of Motion 3 times a day.  - Reposition patient every 2 hours.  - Dangle patient 2 times a day  - Stand patient 2 times a day  - Ambulate patient 2 times a day  - Out of bed to chair 2 times a day   - Out of bed for meals 2 times a day  - Out of bed for toileting  - Record patient progress and toleration of activity level   Outcome: Progressing     Problem: DISCHARGE PLANNING  Goal: Discharge to home or other facility with appropriate resources  Description: INTERVENTIONS:  - Identify barriers to discharge  w/patient and caregiver  - Arrange for needed discharge resources and transportation as appropriate  - Identify discharge learning needs (meds, wound care, etc.)  - Arrange for interpretive services to assist at discharge as needed  - Refer to Case Management Department for coordinating discharge planning if the patient needs post-hospital services based on physician/advanced practitioner order or complex needs related to functional status, cognitive ability, or social support system  Outcome: Progressing     Problem: Knowledge Deficit  Goal: Patient/family/caregiver demonstrates understanding of disease process, treatment plan, medications, and discharge instructions  Description: Complete learning assessment and assess knowledge base.  Interventions:  - Provide teaching at level of understanding  - Provide teaching via preferred learning methods  Outcome: Progressing

## 2024-09-26 NOTE — CONSULTS
DERMATOLOGY:  CONSULTATION   Jose Elias Mcgrath 69 y.o. male MRN: [id number]  Unit/Bed#: -01 Encounter: [id number]          Inpatient consult to Dermatology     Date/Time  9/26/2024 12:03 PM     Performed by  Poonam Cole MD   Authorized by  Romelia Gabriel PA-C                 DERM CONSULT - Attending and resident present VIRTUALLY. Patient photos reviewed. Patient in hospital setting.  Assessment can only be provided based on clinical images received and symptoms conveyed.       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 Cannot comment on fungal involvement until superinfection more resolved, biopsy reports from June 2024 did not reveal any fungal elements. PMH of eczema would predispose to infections    Agree w ID re oral gnr coverage w cefepime  Fu cultures and sensitivities   If okay with ID, can consider adding topical gentamicin   Further management of background eczema and possible tinea tbd after infection is more controlled   Can follow outpt w [facility] derm or his already established derm      Please set up discharge follow up by calling our office: [phone])     Thank you for involving me in the care of your patient. Please call with questions, change in clinical status or if tests recommended above are abnormal.     Discussed with the primary service.      History:     HISTORY OF PRESENT ILLNESS:    Reason for Consult: Feet rash  HPI: Jose Elias Mcgrath is a 69 y.o. year old male who sees derm at [facility]. Has had chronic feet rash, biopsied in June and read as sponge w eos cw eczema, was supposed to start methotrexate but has not yet. Has had hx of tinea pedis/onycho treated with oral fluconazole as well as topical ketoconazole. Presented to ED w increased swelling and drainage of R foot. CT of R foot wo abscess but possible cellulitis, wound cx +1 GNR, blood cx no growth 24h. CBC wnl, pt  non-toxic sofia      Review of Systems:  Review of Systems      PAST MEDICAL HISTORY:  Past Medical History:   Diagnosis Date    BPH (benign prostatic hyperplasia)     CAD (coronary artery disease)     Enlarged prostate     Hyperlipidemia     Vitamin D deficiency      Past Surgical History:   Procedure Laterality Date    COLONOSCOPY      CORONARY ANGIOPLASTY Left 2021    with heart cath    INGUINAL HERNIA REPAIR  2017    TONSILECTOMY AND ADNOIDECTOMY         FAMILY HISTORY:  Non-contributory    SOCIAL HISTORY:  Social History     Social History     Substance and Sexual Activity   Alcohol Use Not Currently     Social History     Substance and Sexual Activity   Drug Use Never     Social History     Tobacco Use   Smoking Status Every Day    Current packs/day: 1.00    Types: Cigarettes   Smokeless Tobacco Never       ALLERGIES:  No Known Allergies    MEDICATIONS:  All current active medications have been reviewed.       Physical exam:     Temp:  [97.3 °F (36.3 °C)-97.7 °F (36.5 °C)] 97.7 °F (36.5 °C)  HR:  [71-75] 73  Resp:  [18] 18  BP: (114-120)/(73-74) 120/73  SpO2:  [94 %-98 %] 97 %  Temp (24hrs), Av.5 °F (36.4 °C), Min:97.3 °F (36.3 °C), Max:97.7 °F (36.5 °C)  Current: Temperature: 97.7 °F (36.5 °C)      PHOTOGRAPHIC SKIN EXAMINATION       Labs, Imaging, & Other Studies     Lab Results:  I have personally reviewed pertinent labs.     Results from last 7 days   Lab Units 24  0455 24  0450 24  1515   WBC Thousand/uL 6.74 9.49 10.36*   HEMOGLOBIN g/dL 13.2 14.5 14.9   PLATELETS Thousands/uL 205 219 226     Results from last 7 days   Lab Units 24  0455 24  0450   POTASSIUM mmol/L 3.4* 3.6   CHLORIDE mmol/L 105 104   CO2 mmol/L 22 25   BUN mg/dL 12 13   CREATININE mg/dL 0.93 0.95   EGFR ml/min/1.73sq m 82 80   CALCIUM mg/dL 8.4 9.1   AST U/L  --  15   ALT U/L  --  13   ALK PHOS U/L  --  52     Results from last 7 days   Lab Units 24  2118 24  1555  09/24/24  1515   BLOOD CULTURE   --  No Growth at 24 hrs. No Growth at 24 hrs.   GRAM STAIN RESULT  No polys seen*  1+ Gram negative rods*  --   --        Pathology:   I have personally reviewed pertinent reports.       Poonam Cole MD  Dermatology, PGY-3

## 2024-09-26 NOTE — PROGRESS NOTES
Progress Note - Infectious Disease   Name: Jose Elias Mcgrath 69 y.o. male I MRN: [id number]  Unit/Bed#: -01 I Date of Admission: 9/24/2024   Date of Service: 9/25/2024 I Hospital Day: 1        VIRTUAL CARE DOCUMENTATION:     1. This service was provided via Telemedicine using [employer]     2. Parties in the room with patient during teleconsult Patient only    3. Confidentiality My office door was closed     4. Participants No one else was in the room    5. Patient acknowledged consent and understanding of privacy and security of the  Telemedicine consult. I informed the patient that I have reviewed their record in Epic and presented the opportunity for them to ask any questions regarding the visit today.  The patient agreed to participate.    6. Time spent 9 minutes     Assessment & Plan  Cellulitis of right lower extremity  In the setting of severe eczema of foot and onychomycosis. Suspect strep, staph but wound cx with GNR consider pseudomonas, no previous hx MRSA. CT is without abscess. Recent LEADs wnl. Had been taking keflex prior to presentation but progression of symptoms may be secondary to edema v/s resistant organism. Patient is systemically well, afebrile without leukocytosis, clinically improving     -Discontinue cefazolin and start cefepime 2 q8h  -FU blood, wound cx and MRSA nasal culture   -Continue supportive care, monitor clinical course   -Serial extremity exams  -Check daily CBC, CMP while inpatient to monitor for any evolving antibiotic toxicity or treatment failure    Dyshidrotic eczema  New onset for one year, affecting both hands and feet, confirmed on biopsy in June. Symptoms have failed to improve with topical steroids and has been treated with keflex for positive skin cx in the past. Recently started on methotrexate on 9/11. Is a risk factor for recurrent infection     -Recommend dermatology evaluation and consideration for repeat biopsy for routine and fungal c/s, plan to start  methotrexate, consider allergy/immunology evaluation as outpatient   -Check HIV screen, patient is declining at present  Onychomycosis  Risk factor for infection, has completed course of terbinafine and pulsed fluconazole     -FU per dermatology and nail trimming  Tobacco user     -Recommend tobacco cessation counseling      Reviewed recommendations to change iv abx and fu cx  with primary team who is in agreement. Will follow.  Please call with concerns or any changes in clinical status or new test results.      Subjective:  The patient has no complaints. Continues to note right foot pain and swelling but oozing has stopped and redness appears improved.  Denies fevers, chills, or sweats.  Denies nausea, vomiting, or diarrhea.    Antibiotics:  Cefazolin    Physical Exam     Temp:  [97.3 °F (36.3 °C)-97.7 °F (36.5 °C)] 97.3 °F (36.3 °C)  HR:  [71-74] 71  Resp:  [18] 18  BP: (100-116)/(65-77) 114/74  SpO2:  [97 %-99 %] 98 %  Temp (24hrs), Av.5 °F (36.4 °C), Min:97.3 °F (36.3 °C), Max:97.7 °F (36.5 °C)  Current: Temperature: (!) 97.3 °F (36.3 °C)    Intake/Output Summary (Last 24 hours) at 2024  Last data filed at 2024 1701  Gross per 24 hour   Intake 240 ml   Output --   Net 240 ml       Physical exam findings reported by bedside and primary medical team staff      General Appearance:  Appearing well, nontoxic, and in no distress, appears stated age   Lungs:   Clear to auscultation bilaterally, no audible wheezes, rhonchi and rales, respirations unlabored   Heart:  Regular rate and rhythm, S1, S2 normal, no murmur, rub or gallop   Abdomen:   Soft, non-tender, non-distended, positive bowel sounds, no masses, no organomegaly    No CVA tenderness   Extremities: Extremities normal, atraumatic, no cyanosis, clubbing , R foot edema 2+    Skin: Awaiting updated media pics: right foot erythema with scaly desquamation   Neurologic: Alert and oriented times 3,         Invasive Devices:   Peripheral IV  09/24/24 Right Antecubital (Active)   Site Assessment WDL 09/25/24 1000   Dressing Type Transparent 09/25/24 1000   Line Status Flushed 09/25/24 1000   Dressing Status Clean;Dry;Intact 09/25/24 1000   Dressing Change Due 09/28/24 09/24/24 2300   Reason Not Rotated Not due 09/25/24 1000       Labs, Imaging, & Other Studies     Lab Results:    I have personally reviewed pertinent labs.    Results from last 7 days   Lab Units 09/25/24  0450 09/24/24  1515   WBC Thousand/uL 9.49 10.36*   HEMOGLOBIN g/dL 14.5 14.9   PLATELETS Thousands/uL 219 226     Results from last 7 days   Lab Units 09/25/24  0450   POTASSIUM mmol/L 3.6   CHLORIDE mmol/L 104   CO2 mmol/L 25   BUN mg/dL 13   CREATININE mg/dL 0.95   EGFR ml/min/1.73sq m 80   CALCIUM mg/dL 9.1   AST U/L 15   ALT U/L 13   ALK PHOS U/L 52     Results from last 7 days   Lab Units 09/24/24  2118 09/24/24  1555 09/24/24  1515   BLOOD CULTURE   --  Received in Microbiology Lab. Culture in Progress. Received in Microbiology Lab. Culture in Progress.   GRAM STAIN RESULT  No polys seen*  1+ Gram negative rods*  --   --        Imaging Studies:   I have personally reviewed pertinent imaging study reports and images in PACS.      EKG, Pathology, and Other Studies:   I have personally reviewed pertinent reports.        Counseling/Coordination of care:       Total 50 minutes in evaluation of the patient and communication with the patient via telehealth of which 30 minutes was in counseling/coordination of care.  Extensive review of the medical records in epic including review of the notes, radiographs, and laboratory results.  My recommendations were discussed with the patient in detail who verbalized understanding.

## 2024-09-26 NOTE — ASSESSMENT & PLAN NOTE
History of angioplasty with coronary stent, follows with cardiology outpatient currently on aspirin 81 mg daily and brillinta 60 mg bid, zetia and crestor, will continue at this time  Stop brillinta and aspirin, switch to plavix per cardiology and vascular recs   Continue outpatient cardiology follow up

## 2024-09-26 NOTE — CONSULTS
Consultation - Vascular Surgery   Name: Jose Elias Mcgrath 69 y.o. male I MRN: [id number]  Unit/Bed#: -01 I Date of Admission: 9/24/2024   Date of Service: 9/26/2024 I Hospital Day: 2   Inpatient consult to Vascular Surgery  Consult performed by: MELQUIADES Blair  Consult ordered by: Romelia Gabriel PA-C        Physician Requesting Evaluation: Agnieszka Chu MD   Reason for Evaluation / Principal Problem: R foot cellulitis    Assessment & Plan  Cellulitis of right lower extremity  69-year-old male smoker with HLD, CAD s/p stenting, COPD, and dyshidrotic eczema presents with right foot cellulitis.  Vascular was consulted for recommendations regarding lower extremity arterial duplex findings.    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 High grade stenosis vs. occlusion noted in the PT and in the proximal to distal AT with reconstitution in the distal AT. INNA 1.28/90/62     LEFT:proximal popliteal artery is aneurysmal with mural thrombus noted, measuring approximately 2.56 cm Evidence of high grade stenosis vs. occlusion noted in the distal PT, and in the mid to distal ATwith retrograde reconstitution noted in the distal anterior tibial artery. INNA  0.92/113/33    CTA abdomen w/ runoff: (Ordered)         Labs:  sCr 0.93/ eGFR 82  K+ 3.4  WBC 6.74  BC NG x 24 hrs    Recommendations:  -Right foot cellulitis with reported palpable pulses in setting of popliteal aneurysm and high-grade stenosis versus occlusion of tibial arteries.  -Concern for thromboembolic complication however most likely no longer acute.  -Recommend CTA abdomen with runoff.  Kidney function stable.  -Urgent outpatient vascular follow up.  Will most likely need b/l pop aneurysm intervention with the distal tibial occlusions.   -Outpatient vascular schedulers notified.  -Communicated recommendations with SLIM  -Continue ASA/ Brilinta  -Continue statin and  sourav  -D/w Dr Sharma  Hyperlipidemia  Per primary team  Status post primary angioplasty with coronary stent  Per primary/ Cardiology  -On DAPT/ statin/ zetia  Tobacco user  Smoking cessation      Dyshidrotic eczema  Per Dermatology  Peripheral arterial disease (HCC)    Vascular Surgery service will follow.    History of Present Illness   Jose Elias Mcgrath is a 69 y.o. male who presents with R foot erythema, drainage and edema.  In review of chart patient had been seeing dermatology x 1 year being treated for eczema.  Patient has tried oral antibiotics with Keflex 3 rounds.    Per report patient denies claudication type symptoms and has palpable DP pulses bilaterally.  He does not follow with vascular.    Imaging reviewed remotely and discussed with attending.  Recommend further imaging with CTA abdomen and runoff and urgent outpatient follow-up for continued care.    Patient was not personally seen or examined by myself.  Total of 25 minutes spent reviewing the chart, clinical images, diagnostic imaging studies and discussion with team.    Objective      Temp:  [97.3 °F (36.3 °C)-97.7 °F (36.5 °C)] 97.7 °F (36.5 °C)  HR:  [71-75] 73  Resp:  [18] 18  BP: (114-120)/(73-74) 120/73  O2 Device: None (Room air)          I/O         09/24 0701  09/25 0700 09/25 0701 09/26 0700 09/26 0701  09/27 0700    P.O.  240     Total Intake(mL/kg)  240 (2.9)     Net  +240                  Lines/Drains/Airways       Active Status       None                    Lab Results: I have reviewed the following results: CBC/BMP:   .     09/26/24  0455   WBC 6.74   HGB 13.2   HCT 39.2      SODIUM 135   K 3.4*      CO2 22   BUN 12   CREATININE 0.93   GLUC 106      Imaging Review: Reviewed radiology reports from this admission including: Ultrasound(s).  Other Studies: No additional pertinent studies reviewed.

## 2024-09-26 NOTE — ASSESSMENT & PLAN NOTE
Presented to ED with right foot swelling and drainage. States that he has been following with dermatology x 1 year and has been on diflucan previously for what he thought was fungal infection. Recently given keflex and diagnosed with eczema he said. States that he has been having worsening swelling, pain, and redness.   Outpatient therapy trialed with keflex and recently started on methotrexate for his eczema however he has not been taking the methotrexate.   Only started taking keflex around 4 days ago he said, but was prescribed on 9/11.   CT RLE: Extensive subcutaneous edema throughout the foot and ankle which could be cellulitis, without abscess formation. No evidence of osteomyelitis.  Procal normal  Cefepime  Blood cx NG 48 hours  Wound cx pending   Given 1 dose lasix due to swelling. Continue prn  venous duplex negative for DVT  Compression wrapping, elevation  Derm consulted - continue antibiotics and monitor cx. Once controlled, underlying eczema or tinea must be better managed. Consider topical gentamicin. Should follow up outpatient   ID consult -switch to cefepime and monitor cultures and MRSA. Recommend HIV screening and derm eval  Patient refusing HIV screening

## 2024-09-26 NOTE — ASSESSMENT & PLAN NOTE
In the setting of severe eczema of foot and onychomycosis. Suspect strep, staph but wound cx with GNR consider pseudomonas, no previous hx MRSA. CT is without abscess. Recent LEADs wnl. Had been taking keflex prior to presentation but progression of symptoms may be secondary to edema v/s resistant organism. Patient is systemically well, afebrile without leukocytosis, clinically improving     -Discontinue cefazolin and start cefepime 2 q8h  -FU blood, wound cx and MRSA nasal culture   -Continue supportive care, monitor clinical course   -Serial extremity exams  -Check daily CBC, CMP while inpatient to monitor for any evolving antibiotic toxicity or treatment failure

## 2024-09-26 NOTE — ASSESSMENT & PLAN NOTE
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  Evidence of high-grade stenosis versus occlusion and posterior tibial arteries  Vascular consulted - Start heparin drip and plavix. Urgent outpatient follow up on discharge, has appointment in [place] 10/1  D/w cardiology, stop brillinta, can use either aspirin OR plavix while on heparin. Vascular requesting plavix  On discharge should continue with Plavix and oral anticoagulation like Eliquis  CT abdomen with runoff -right lower extremity: Mild aneurysmal dilatation of the popliteal artery with eccentric mural thrombus.  Inline flow was provided by single-vessel.  Left lower extremity: Aneurysmal dilatation of the left popliteal artery.  Abrupt occlusion of the proximal peroneal artery.  This reconstitutes to provide single-vessel inline flow to foot

## 2024-09-26 NOTE — PLAN OF CARE
Problem: PAIN - ADULT  Goal: Verbalizes/displays adequate comfort level or baseline comfort level  Description: Interventions:  - Encourage patient to monitor pain and request assistance  - Assess pain using appropriate pain scale  - Administer analgesics based on type and severity of pain and evaluate response  - Implement non-pharmacological measures as appropriate and evaluate response  - Consider cultural and social influences on pain and pain management  - Notify physician/advanced practitioner if interventions unsuccessful or patient reports new pain  Outcome: Progressing     Problem: INFECTION - ADULT  Goal: Absence or prevention of progression during hospitalization  Description: INTERVENTIONS:  - Assess and monitor for signs and symptoms of infection  - Monitor lab/diagnostic results  - Monitor all insertion sites, i.e. indwelling lines, tubes, and drains  - Monitor endotracheal if appropriate and nasal secretions for changes in amount and color  - [place] appropriate cooling/warming therapies per order  - Administer medications as ordered  - Instruct and encourage patient and family to use good hand hygiene technique  - Identify and instruct in appropriate isolation precautions for identified infection/condition  Outcome: Progressing  Goal: Absence of fever/infection during neutropenic period  Description: INTERVENTIONS:  - Monitor WBC    Outcome: Progressing     Problem: SAFETY ADULT  Goal: Patient will remain free of falls  Description: INTERVENTIONS:  - Educate patient/family on patient safety including physical limitations  - Instruct patient to call for assistance with activity   - Consult OT/PT to assist with strengthening/mobility   - Keep Call bell within reach  - Keep bed low and locked with side rails adjusted as appropriate  - Keep care items and personal belongings within reach  - Initiate and maintain comfort rounds  - Make Fall Risk Sign visible to staff  - Offer Toileting every 2 Hours,  in advance of need  - Initiate/Maintain bedalarm  - Obtain necessary fall risk management equipment:   - Apply yellow socks and bracelet for high fall risk patients  - Consider moving patient to room near nurses station  Outcome: Progressing  Goal: Maintain or return to baseline ADL function  Description: INTERVENTIONS:  -  Assess patient's ability to carry out ADLs; assess patient's baseline for ADL function and identify physical deficits which impact ability to perform ADLs (bathing, care of mouth/teeth, toileting, grooming, dressing, etc.)  - Assess/evaluate cause of self-care deficits   - Assess range of motion  - Assess patient's mobility; develop plan if impaired  - Assess patient's need for assistive devices and provide as appropriate  - Encourage maximum independence but intervene and supervise when necessary  - Involve family in performance of ADLs  - Assess for home care needs following discharge   - Consider OT consult to assist with ADL evaluation and planning for discharge  - Provide patient education as appropriate  Outcome: Progressing  Goal: Maintains/Returns to pre admission functional level  Description: INTERVENTIONS:  - Perform AM-PAC 6 Click Basic Mobility/ Daily Activity assessment daily.  - Set and communicate daily mobility goal to care team and patient/family/caregiver.   - Collaborate with rehabilitation services on mobility goals if consulted  - Perform Range of Motion 2 times a day.  - Reposition patient every 2 hours.  - Dangle patient 2 times a day  - Stand patient 2 times a day  - Ambulate patient 2 times a day  - Out of bed to chair 2 times a day   - Out of bed for meals 2 times a day  - Out of bed for toileting  - Record patient progress and toleration of activity level   Outcome: Progressing     Problem: DISCHARGE PLANNING  Goal: Discharge to home or other facility with appropriate resources  Description: INTERVENTIONS:  - Identify barriers to discharge w/patient and caregiver  -  Arrange for needed discharge resources and transportation as appropriate  - Identify discharge learning needs (meds, wound care, etc.)  - Arrange for interpretive services to assist at discharge as needed  - Refer to Case Management Department for coordinating discharge planning if the patient needs post-hospital services based on physician/advanced practitioner order or complex needs related to functional status, cognitive ability, or social support system  Outcome: Progressing     Problem: Knowledge Deficit  Goal: Patient/family/caregiver demonstrates understanding of disease process, treatment plan, medications, and discharge instructions  Description: Complete learning assessment and assess knowledge base.  Interventions:  - Provide teaching at level of understanding  - Provide teaching via preferred learning methods  Outcome: Progressing

## 2024-09-26 NOTE — ASSESSMENT & PLAN NOTE
69-year-old male smoker with HLD, CAD s/p stenting, COPD, and dyshidrotic eczema presents with right foot cellulitis.  Vascular was consulted for recommendations regarding lower extremity arterial duplex findings.    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 High grade stenosis vs. occlusion noted in the PT and in the proximal to distal AT with reconstitution in the distal AT. INNA 1.28/90/62     LEFT:proximal popliteal artery is aneurysmal with mural thrombus noted, measuring approximately 2.56 cm Evidence of high grade stenosis vs. occlusion noted in the distal PT, and in the mid to distal ATwith retrograde reconstitution noted in the distal anterior tibial artery. INNA  0.92/113/33    CTA abdomen w/ runoff: (Ordered)         Labs:  sCr 0.93/ eGFR 82  K+ 3.4  WBC 6.74  BC NG x 24 hrs    Recommendations:  -Right foot cellulitis with reported palpable pulses in setting of popliteal aneurysm and high-grade stenosis versus occlusion of tibial arteries.  -Concern for thromboembolic complication however most likely no longer acute.  -Recommend CTA abdomen with runoff.  Kidney function stable.  -Urgent outpatient vascular follow up.  Will most likely need b/l pop aneurysm intervention with the distal tibial occlusions.   -Outpatient vascular schedulers notified.  -Communicated recommendations with SLIM  -Continue ASA/ Brilinta  -Continue statin and zetia  -D/w Dr Sharma

## 2024-09-26 NOTE — PROGRESS NOTES
Progress Note - Hospitalist   Name: Jose Elias Mcgrath 69 y.o. male I MRN: [id number]  Unit/Bed#: [facility] I Date of Admission: 9/24/2024   Date of Service: 9/26/2024 I Hospital Day: 2    Assessment & Plan  Cellulitis of right lower extremity  Presented to ED with right foot swelling and drainage. States that he has been following with dermatology x 1 year and has been on diflucan previously for what he thought was fungal infection. Recently given keflex and diagnosed with eczema he said. States that he has been having worsening swelling, pain, and redness.   Outpatient therapy trialed with keflex and recently started on methotrexate for his eczema however he has not been taking the methotrexate.   Only started taking keflex around 4 days ago he said, but was prescribed on 9/11.   CT RLE: Extensive subcutaneous edema throughout the foot and ankle which could be cellulitis, without abscess formation. No evidence of osteomyelitis.  Procal normal  Cefepime  Blood cx pending  Wound cx pending   Given 1 dose lasix due to swelling. Continue prn  venous duplex negative for DVT  Compression wrapping, elevation  ID consult -switch to cefepime and monitor cultures and MRSA. Recommend HIV screening and derm eval  Patient refusing HIV screening  Will d/w derm if able to do tele consult   Chronic obstructive pulmonary disease (COPD) (HCC)  History of COPD, not currently in acute exacerbation, not on any maintenance medications for this  Benign prostatic hyperplasia with urinary obstruction  Continue flomax  Urinary retention protocol  Hyperlipidemia  Lipitor intolerance, currently on crestor 20 mg every other day and zetia 10 mg daily - continue  Status post primary angioplasty with coronary stent  History of angioplasty with coronary stent, follows with cardiology outpatient currently on aspirin 81 mg daily and brillinta 60 mg bid, zetia and crestor, will continue at this time  Continue outpatient cardiology follow up    Tobacco user  Encourage smoking cessation, nicotine patch ordered  Dyshidrotic eczema    Onychomycosis    Peripheral arterial disease (HCC)  Pedal pulses felt, no claudication and does not follow with vascular outpatient   Arterial duplex with bilateral popliteal artery is aneurysmal with mural thrombus noted.  Evidence of high-grade stenosis versus occlusion and posterior tibial arteries  Vascular consulted - CTA abd with runoff.  Urgent outpatient follow up on discharge  CT abdomen with runoff pending    VTE Pharmacologic Prophylaxis: VTE Score: 5 High Risk (Score >/= 5) - Pharmacological DVT Prophylaxis Ordered: enoxaparin (Lovenox). Sequential Compression Devices Ordered.    Mobility:   Basic Mobility Inpatient Raw Score: 24  JH-HLM Goal: 8: Walk 250 feet or more  JH-HLM Achieved: 7: Walk 25 feet or more  JH-HLM Goal achieved. Continue to encourage appropriate mobility.    Patient Centered Rounds: I performed bedside rounds with nursing staff today.   Discussions with Specialists or Other Care Team Provider: nursing, cm, ID    Education and Discussions with Family / Patient: Patient declined call to .     Current Length of Stay: 2 day(s)  Current Patient Status: Inpatient   Certification Statement: The patient will continue to require additional inpatient hospital stay due to requiring iv antibiotics for cellulitis   Discharge Plan: Anticipate discharge in 48 hrs to home.    Code Status: Level 1 - Full Code    Subjective   Patient states that he does not want an HIV screen because it is not necessary. Denies chest pain, shortness of breath, abdominal pain or claudication pain. Agreeable to current plan.     Objective     Vitals:   Temp (24hrs), Av.5 °F (36.4 °C), Min:97.3 °F (36.3 °C), Max:97.7 °F (36.5 °C)    Temp:  [97.3 °F (36.3 °C)-97.7 °F (36.5 °C)] 97.7 °F (36.5 °C)  HR:  [71-75] 73  Resp:  [18] 18  BP: (114-120)/(73-74) 120/73  SpO2:  [94 %-98 %] 97 %  Body mass index is 26.57 kg/m².      Input and Output Summary (last 24 hours):     Intake/Output Summary (Last 24 hours) at 9/26/2024 1112  Last data filed at 9/25/2024 1701  Gross per 24 hour   Intake 120 ml   Output --   Net 120 ml       Physical Exam  Vitals reviewed.   Constitutional:       General: He is not in acute distress.     Appearance: Normal appearance. He is not ill-appearing.   HENT:      Head: Normocephalic and atraumatic.      Nose: Nose normal.      Mouth/Throat:      Mouth: Mucous membranes are moist.      Pharynx: Oropharynx is clear.   Eyes:      Conjunctiva/sclera: Conjunctivae normal.   Cardiovascular:      Rate and Rhythm: Normal rate and regular rhythm.      Pulses: Normal pulses.      Heart sounds: Normal heart sounds. No murmur heard.  Pulmonary:      Effort: Pulmonary effort is normal. No respiratory distress.      Breath sounds: Normal breath sounds. No wheezing.   Abdominal:      General: Abdomen is flat. Bowel sounds are normal. There is no distension.      Palpations: Abdomen is soft.      Tenderness: There is no abdominal tenderness.   Musculoskeletal:         General: Normal range of motion.      Cervical back: Normal range of motion.      Right lower leg: No edema.      Left lower leg: No edema.   Skin:     General: Skin is warm.      Findings: Erythema present.      Comments: Erythema and scaling skin to bilateral feet, R>L   Neurological:      General: No focal deficit present.      Mental Status: He is alert and oriented to person, place, and time. Mental status is at baseline.      Cranial Nerves: No cranial nerve deficit.      Motor: No weakness.   Psychiatric:         Mood and Affect: Mood normal.         Behavior: Behavior normal.         Thought Content: Thought content normal.         Judgment: Judgment normal.          Lines/Drains:  Lines/Drains/Airways       Active Status       None                            Lab Results: I have reviewed the following results:    Results from last 7 days   Lab Units  09/26/24  0455 09/25/24  0450 09/24/24  1515   WBC Thousand/uL 6.74   < > 10.36*   HEMOGLOBIN g/dL 13.2   < > 14.9   HEMATOCRIT % 39.2   < > 44.4   PLATELETS Thousands/uL 205   < > 226   SEGS PCT %  --   --  75   LYMPHO PCT %  --   --  15   MONO PCT %  --   --  7   EOS PCT %  --   --  2    < > = values in this interval not displayed.     Results from last 7 days   Lab Units 09/26/24  0455 09/25/24  0450   SODIUM mmol/L 135 136   POTASSIUM mmol/L 3.4* 3.6   CHLORIDE mmol/L 105 104   CO2 mmol/L 22 25   BUN mg/dL 12 13   CREATININE mg/dL 0.93 0.95   ANION GAP mmol/L 8 7   CALCIUM mg/dL 8.4 9.1   ALBUMIN g/dL  --  3.8   TOTAL BILIRUBIN mg/dL  --  0.55   ALK PHOS U/L  --  52   ALT U/L  --  13   AST U/L  --  15   GLUCOSE RANDOM mg/dL 106 98             Results from last 7 days   Lab Units 09/24/24  1515   HEMOGLOBIN A1C % 5.9*     Results from last 7 days   Lab Units 09/25/24  0450 09/24/24  1515   PROCALCITONIN ng/ml <0.05 <0.05       Recent Cultures (last 7 days):   Results from last 7 days   Lab Units 09/24/24  2118 09/24/24  1555 09/24/24  1515   BLOOD CULTURE   --  No Growth at 24 hrs. No Growth at 24 hrs.   GRAM STAIN RESULT  No polys seen*  1+ Gram negative rods*  --   --        Imaging Review: Reviewed radiology reports from this admission including: venous and arterial duplex.  Other Studies: No additional pertinent studies reviewed.    Last 24 Hours Medication List:     Current Facility-Administered Medications:     aspirin chewable tablet 81 mg, Daily    cefepime (MAXIPIME) IVPB (premix in dextrose) 2,000 mg 50 mL, Q8H    co-enzyme Q-10 capsule 200 mg, BID    enoxaparin (LOVENOX) subcutaneous injection 40 mg, Daily    ezetimibe (ZETIA) tablet 10 mg, Daily    losartan (COZAAR) tablet 25 mg, HS    metoprolol succinate (TOPROL-XL) 24 hr tablet 25 mg, Daily    nicotine (NICODERM CQ) 14 mg/24hr TD 24 hr patch 1 patch, Daily    tamsulosin (FLOMAX) capsule 0.4 mg, QAM    ticagrelor tablet 60 mg, BID    Administrative  Statements   Today, Patient Was Seen By: Romelia Gabriel PA-C      **Please Note: This note may have been constructed using a voice recognition system.**

## 2024-09-27 LAB
ANION GAP SERPL CALCULATED.3IONS-SCNC: 6 MMOL/L (ref 4–13)
APTT PPP: 66 SECONDS (ref 23–34)
APTT PPP: 87 SECONDS (ref 23–34)
BUN SERPL-MCNC: 15 MG/DL (ref 5–25)
CALCIUM SERPL-MCNC: 8.7 MG/DL (ref 8.4–10.2)
CHLORIDE SERPL-SCNC: 106 MMOL/L (ref 96–108)
CO2 SERPL-SCNC: 25 MMOL/L (ref 21–32)
CREAT SERPL-MCNC: 0.82 MG/DL (ref 0.6–1.3)
ERYTHROCYTE [DISTWIDTH] IN BLOOD BY AUTOMATED COUNT: 13.9 % (ref 11.6–15.1)
GFR SERPL CREATININE-BSD FRML MDRD: 88 ML/MIN/1.73SQ M
GLUCOSE SERPL-MCNC: 95 MG/DL (ref 65–140)
HCT VFR BLD AUTO: 41.5 % (ref 36.5–49.3)
HGB BLD-MCNC: 14 G/DL (ref 12–17)
MCH RBC QN AUTO: 31.5 PG (ref 26.8–34.3)
MCHC RBC AUTO-ENTMCNC: 33.7 G/DL (ref 31.4–37.4)
MCV RBC AUTO: 93 FL (ref 82–98)
PLATELET # BLD AUTO: 200 THOUSANDS/UL (ref 149–390)
PMV BLD AUTO: 9.3 FL (ref 8.9–12.7)
POTASSIUM SERPL-SCNC: 4 MMOL/L (ref 3.5–5.3)
RBC # BLD AUTO: 4.45 MILLION/UL (ref 3.88–5.62)
SODIUM SERPL-SCNC: 137 MMOL/L (ref 135–147)
WBC # BLD AUTO: 6.4 THOUSAND/UL (ref 4.31–10.16)

## 2024-09-27 PROCEDURE — 85027 COMPLETE CBC AUTOMATED: CPT

## 2024-09-27 PROCEDURE — 85730 THROMBOPLASTIN TIME PARTIAL: CPT | Performed by: FAMILY MEDICINE

## 2024-09-27 PROCEDURE — 99233 SBSQ HOSP IP/OBS HIGH 50: CPT | Performed by: INTERNAL MEDICINE

## 2024-09-27 PROCEDURE — 80048 BASIC METABOLIC PNL TOTAL CA: CPT

## 2024-09-27 PROCEDURE — 99232 SBSQ HOSP IP/OBS MODERATE 35: CPT

## 2024-09-27 PROCEDURE — 85730 THROMBOPLASTIN TIME PARTIAL: CPT | Performed by: NURSE PRACTITIONER

## 2024-09-27 RX ORDER — FUROSEMIDE 10 MG/ML
20 INJECTION INTRAMUSCULAR; INTRAVENOUS ONCE
Status: COMPLETED | OUTPATIENT
Start: 2024-09-27 | End: 2024-09-27

## 2024-09-27 RX ADMIN — CEFEPIME HYDROCHLORIDE 2000 MG: 2 INJECTION, SOLUTION INTRAVENOUS at 10:40

## 2024-09-27 RX ADMIN — Medication 200 MG: at 08:17

## 2024-09-27 RX ADMIN — EZETIMIBE 10 MG: 10 TABLET ORAL at 08:17

## 2024-09-27 RX ADMIN — NICOTINE 1 PATCH: 14 PATCH, EXTENDED RELEASE TRANSDERMAL at 08:18

## 2024-09-27 RX ADMIN — TAMSULOSIN HYDROCHLORIDE 0.4 MG: 0.4 CAPSULE ORAL at 08:17

## 2024-09-27 RX ADMIN — CEFEPIME HYDROCHLORIDE 2000 MG: 2 INJECTION, SOLUTION INTRAVENOUS at 02:14

## 2024-09-27 RX ADMIN — HEPARIN SODIUM 14 UNITS/KG/HR: 10000 INJECTION, SOLUTION INTRAVENOUS at 12:33

## 2024-09-27 RX ADMIN — Medication 200 MG: at 17:37

## 2024-09-27 RX ADMIN — METOPROLOL SUCCINATE 25 MG: 25 TABLET, EXTENDED RELEASE ORAL at 08:17

## 2024-09-27 RX ADMIN — FUROSEMIDE 20 MG: 10 INJECTION, SOLUTION INTRAMUSCULAR; INTRAVENOUS at 14:12

## 2024-09-27 RX ADMIN — CEFEPIME HYDROCHLORIDE 2000 MG: 2 INJECTION, SOLUTION INTRAVENOUS at 17:37

## 2024-09-27 RX ADMIN — CLOPIDOGREL 75 MG: 75 TABLET ORAL at 08:17

## 2024-09-27 NOTE — ASSESSMENT & PLAN NOTE
New onset for one year, affecting both hands and feet, confirmed on biopsy in June. Symptoms have failed to improve with topical steroids and has been treated with keflex for positive skin cx in the past. Recently started on methotrexate on 9/11. Is a risk factor for recurrent infection     -Appreciate dermatology evaluation, will need fu as outpatient for improved eczema control as well as consideration for repeat biopsy for routine and fungal c/s, previous plan to start methotrexate, consider allergy/immunology evaluation as outpatient   -Recommend HIV screen, patient is declining at present

## 2024-09-27 NOTE — PLAN OF CARE
Problem: PAIN - ADULT  Goal: Verbalizes/displays adequate comfort level or baseline comfort level  Description: Interventions:  - Encourage patient to monitor pain and request assistance  - Assess pain using appropriate pain scale  - Administer analgesics based on type and severity of pain and evaluate response  - Implement non-pharmacological measures as appropriate and evaluate response  - Consider cultural and social influences on pain and pain management  - Notify physician/advanced practitioner if interventions unsuccessful or patient reports new pain  Outcome: Progressing     Problem: INFECTION - ADULT  Goal: Absence or prevention of progression during hospitalization  Description: INTERVENTIONS:  - Assess and monitor for signs and symptoms of infection  - Monitor lab/diagnostic results  - Monitor all insertion sites, i.e. indwelling lines, tubes, and drains  - Monitor endotracheal if appropriate and nasal secretions for changes in amount and color  - [place] appropriate cooling/warming therapies per order  - Administer medications as ordered  - Instruct and encourage patient and family to use good hand hygiene technique  - Identify and instruct in appropriate isolation precautions for identified infection/condition  Outcome: Progressing  Goal: Absence of fever/infection during neutropenic period  Description: INTERVENTIONS:  - Monitor WBC    Outcome: Progressing     Problem: DISCHARGE PLANNING  Goal: Discharge to home or other facility with appropriate resources  Description: INTERVENTIONS:  - Identify barriers to discharge w/patient and caregiver  - Arrange for needed discharge resources and transportation as appropriate  - Identify discharge learning needs (meds, wound care, etc.)  - Arrange for interpretive services to assist at discharge as needed  - Refer to Case Management Department for coordinating discharge planning if the patient needs post-hospital services based on physician/advanced  practitioner order or complex needs related to functional status, cognitive ability, or social support system  Outcome: Progressing     Problem: Knowledge Deficit  Goal: Patient/family/caregiver demonstrates understanding of disease process, treatment plan, medications, and discharge instructions  Description: Complete learning assessment and assess knowledge base.  Interventions:  - Provide teaching at level of understanding  - Provide teaching via preferred learning methods  Outcome: Progressing

## 2024-09-27 NOTE — PROGRESS NOTES
Progress Note - Infectious Disease   Name: Jose Elias Mcgrath 69 y.o. male I MRN: [id number]  Unit/Bed#: -01 I Date of Admission: 9/24/2024   Date of Service: 9/27/2024 I Hospital Day: 3        VIRTUAL CARE DOCUMENTATION:     1. This service was provided via Telemedicine using [employer] Kit     2. Parties in the room with patient during teleconsult Patient only    3. Confidentiality My office door was closed     4. Participants No one else was in the room    5. Patient acknowledged consent and understanding of privacy and security of the  Telemedicine consult. I informed the patient that I have reviewed their record in Epic and presented the opportunity for them to ask any questions regarding the visit today.  The patient agreed to participate.    6. Time spent 9 minutes     Assessment & Plan  Cellulitis of right lower extremity  In the setting of severe eczema of foot and onychomycosis. Suspect strep, staph but wound cx with GNR consider pseudomonas, no previous hx MRSA and nasal screen is negative. CT is without abscess. Recent LEADs wnl. Had been taking keflex prior to presentation but progression of symptoms may be secondary to edema v/s resistant organism. Patient is systemically well, afebrile without leukocytosis, clinically improving but has persistent foot edema     -Continue cefepime 2 q8h  -FU blood, wound cx   -Continue supportive care, monitor clinical course, continue prn diuretics and consider compression wrap   -Serial extremity exams  -Check daily CBC, CMP while inpatient to monitor for any evolving antibiotic toxicity or treatment failure    Dyshidrotic eczema  New onset for one year, affecting both hands and feet, confirmed on biopsy in June. Symptoms have failed to improve with topical steroids and has been treated with keflex for positive skin cx in the past. Recently started on methotrexate on 9/11. Is a risk factor for recurrent infection     -Appreciate dermatology evaluation, will  need fu as outpatient for improved eczema control as well as consideration for repeat biopsy for routine and fungal c/s, previous plan to start methotrexate, consider allergy/immunology evaluation as outpatient   -Recommend HIV screen, patient is declining at present  Onychomycosis  Risk factor for infection, has completed course of terbinafine and pulsed fluconazole     -FU per dermatology and nail trimming  Tobacco user     -Recommend tobacco cessation counseling  Peripheral arterial disease (HCC)        Reviewed recommendations to continue iv abx and fu cx  with primary team who is in agreement. Will follow.  Please call with concerns or any changes in clinical status or new test results.      Subjective:  The patient has no complaints. Continues to note right foot swelling , does not feel like he has had much improvement. Some improvement in redness.  Denies fevers, chills, or sweats.  Denies nausea, vomiting, or diarrhea.    Antibiotics:  Cefepime    Physical Exam     Temp:  [97.5 °F (36.4 °C)-97.7 °F (36.5 °C)] 97.5 °F (36.4 °C)  HR:  [59-73] 59  Resp:  [15-18] 18  BP: (112-123)/(67-68) 119/68  SpO2:  [95 %-99 %] 99 %  Temp (24hrs), Av.6 °F (36.4 °C), Min:97.5 °F (36.4 °C), Max:97.7 °F (36.5 °C)  Current: Temperature: 97.5 °F (36.4 °C)  No intake or output data in the 24 hours ending 24 1108      Physical exam findings reported by bedside and primary medical team staff      General Appearance:  Appearing well, nontoxic, and in no distress, appears stated age   Lungs:   Clear to auscultation bilaterally, no audible wheezes, rhonchi and rales, respirations unlabored   Heart:  Regular rate and rhythm, S1, S2 normal, no murmur, rub or gallop   Abdomen:   Soft, non-tender, non-distended, positive bowel sounds, no masses, no organomegaly    No CVA tenderness   Extremities: Extremities normal, atraumatic, no cyanosis, clubbing , R foot edema 2+    Skin: Persistent right foot erythema with scaly  desquamation   Neurologic: Alert and oriented times 3,         Invasive Devices:   Peripheral IV 09/24/24 Right Antecubital (Active)   Site Assessment WDL 09/25/24 1000   Dressing Type Transparent 09/25/24 1000   Line Status Flushed 09/25/24 1000   Dressing Status Clean;Dry;Intact 09/25/24 1000   Dressing Change Due 09/28/24 09/24/24 2300   Reason Not Rotated Not due 09/25/24 1000       Labs, Imaging, & Other Studies     Lab Results:    I have personally reviewed pertinent labs.    Results from last 7 days   Lab Units 09/27/24  0458 09/26/24  0455 09/25/24  0450   WBC Thousand/uL 6.40 6.74 9.49   HEMOGLOBIN g/dL 14.0 13.2 14.5   PLATELETS Thousands/uL 200 205 219     Results from last 7 days   Lab Units 09/27/24  0458 09/26/24  0455 09/25/24  0450   POTASSIUM mmol/L 4.0   < > 3.6   CHLORIDE mmol/L 106   < > 104   CO2 mmol/L 25   < > 25   BUN mg/dL 15   < > 13   CREATININE mg/dL 0.82   < > 0.95   EGFR ml/min/1.73sq m 88   < > 80   CALCIUM mg/dL 8.7   < > 9.1   AST U/L  --   --  15   ALT U/L  --   --  13   ALK PHOS U/L  --   --  52    < > = values in this interval not displayed.     Results from last 7 days   Lab Units 09/24/24 2118 09/24/24  2039 09/24/24  1555 09/24/24  1515   BLOOD CULTURE   --   --  No Growth at 48 hrs. No Growth at 48 hrs.   GRAM STAIN RESULT  No polys seen*  1+ Gram negative rods*  --   --   --    WOUND CULTURE  4+ Growth of Gram Negative Pantera*  --   --   --    MRSA CULTURE ONLY   --  No Methicillin Resistant Staphlyococcus aureus (MRSA) isolated  --   --        Imaging Studies:   I have personally reviewed pertinent imaging study reports and images in PACS.      EKG, Pathology, and Other Studies:   I have personally reviewed pertinent reports.        Counseling/Coordination of care:       Total 50 minutes in evaluation of the patient and communication with the patient via telehealth of which 30 minutes was in counseling/coordination of care.  Extensive review of the medical records in epic  including review of the notes, radiographs, and laboratory results.  My recommendations were discussed with the patient in detail who verbalized understanding.

## 2024-09-27 NOTE — PLAN OF CARE
Problem: PAIN - ADULT  Goal: Verbalizes/displays adequate comfort level or baseline comfort level  Description: Interventions:  - Encourage patient to monitor pain and request assistance  - Assess pain using appropriate pain scale  - Administer analgesics based on type and severity of pain and evaluate response  - Implement non-pharmacological measures as appropriate and evaluate response  - Consider cultural and social influences on pain and pain management  - Notify physician/advanced practitioner if interventions unsuccessful or patient reports new pain  Outcome: Progressing     Problem: INFECTION - ADULT  Goal: Absence or prevention of progression during hospitalization  Description: INTERVENTIONS:  - Assess and monitor for signs and symptoms of infection  - Monitor lab/diagnostic results  - Monitor all insertion sites, i.e. indwelling lines, tubes, and drains  - Monitor endotracheal if appropriate and nasal secretions for changes in amount and color  - [place] appropriate cooling/warming therapies per order  - Administer medications as ordered  - Instruct and encourage patient and family to use good hand hygiene technique  - Identify and instruct in appropriate isolation precautions for identified infection/condition  Outcome: Progressing  Goal: Absence of fever/infection during neutropenic period  Description: INTERVENTIONS:  - Monitor WBC    Outcome: Progressing     Problem: SAFETY ADULT  Goal: Patient will remain free of falls  Description: INTERVENTIONS:  - Educate patient/family on patient safety including physical limitations  - Instruct patient to call for assistance with activity   - Consult OT/PT to assist with strengthening/mobility   - Keep Call bell within reach  - Keep bed low and locked with side rails adjusted as appropriate  - Keep care items and personal belongings within reach  - Initiate and maintain comfort rounds  - Make Fall Risk Sign visible to staff  - Offer Toileting every 2 Hours,  in advance of need  - Initiate/Maintain bed alarm  - Obtain necessary fall risk management equipment: socks, bracelet  - Apply yellow socks and bracelet for high fall risk patients  - Consider moving patient to room near nurses station  Outcome: Progressing  Goal: Maintain or return to baseline ADL function  Description: INTERVENTIONS:  -  Assess patient's ability to carry out ADLs; assess patient's baseline for ADL function and identify physical deficits which impact ability to perform ADLs (bathing, care of mouth/teeth, toileting, grooming, dressing, etc.)  - Assess/evaluate cause of self-care deficits   - Assess range of motion  - Assess patient's mobility; develop plan if impaired  - Assess patient's need for assistive devices and provide as appropriate  - Encourage maximum independence but intervene and supervise when necessary  - Involve family in performance of ADLs  - Assess for home care needs following discharge   - Consider OT consult to assist with ADL evaluation and planning for discharge  - Provide patient education as appropriate  Outcome: Progressing  Goal: Maintains/Returns to pre admission functional level  Description: INTERVENTIONS:  - Perform AM-PAC 6 Click Basic Mobility/ Daily Activity assessment daily.  - Set and communicate daily mobility goal to care team and patient/family/caregiver.   - Collaborate with rehabilitation services on mobility goals if consulted  - Perform Range of Motion 3 times a day.  - Reposition patient every 2 hours.  - Dangle patient 2 times a day  - Stand patient 2 times a day  - Ambulate patient 2 times a day  - Out of bed to chair 2 times a day   - Out of bed for meals 2 times a day  - Out of bed for toileting  - Record patient progress and toleration of activity level   Outcome: Progressing     Problem: DISCHARGE PLANNING  Goal: Discharge to home or other facility with appropriate resources  Description: INTERVENTIONS:  - Identify barriers to discharge w/patient and  caregiver  - Arrange for needed discharge resources and transportation as appropriate  - Identify discharge learning needs (meds, wound care, etc.)  - Arrange for interpretive services to assist at discharge as needed  - Refer to Case Management Department for coordinating discharge planning if the patient needs post-hospital services based on physician/advanced practitioner order or complex needs related to functional status, cognitive ability, or social support system  Outcome: Progressing     Problem: Knowledge Deficit  Goal: Patient/family/caregiver demonstrates understanding of disease process, treatment plan, medications, and discharge instructions  Description: Complete learning assessment and assess knowledge base.  Interventions:  - Provide teaching at level of understanding  - Provide teaching via preferred learning methods  Outcome: Progressing

## 2024-09-27 NOTE — ASSESSMENT & PLAN NOTE
In the setting of severe eczema of foot and onychomycosis. Suspect strep, staph but wound cx with GNR consider pseudomonas, no previous hx MRSA and nasal screen is negative. CT is without abscess. Recent LEADs wnl. Had been taking keflex prior to presentation but progression of symptoms may be secondary to edema v/s resistant organism. Patient is systemically well, afebrile without leukocytosis, clinically improving but has persistent foot edema     -Continue cefepime 2 q8h  -FU blood, wound cx   -Continue supportive care, monitor clinical course, continue prn diuretics and consider compression wrap   -Serial extremity exams  -Check daily CBC, CMP while inpatient to monitor for any evolving antibiotic toxicity or treatment failure

## 2024-09-27 NOTE — CASE MANAGEMENT
Case Management Assessment & Discharge Planning Note    Patient name Jose Elias Mcgrath  Location /-01 MRN [id number]  : 1955 Date 2024       Current Admission Date: 2024  Current Admission Diagnosis:Cellulitis of right lower extremity   Patient Active Problem List    Diagnosis Date Noted Date Diagnosed    Peripheral arterial disease (HCC) 2024     Chronic obstructive pulmonary disease (COPD) (HCC) 2024     Tobacco user 2024     Cellulitis of right lower extremity 2024     Dyshidrotic eczema 2024     Onychomycosis 2024     CAD (coronary artery disease) 03/10/2021     Status post primary angioplasty with coronary stent 03/10/2021     Benign prostatic hyperplasia with urinary obstruction 01/10/2014     Hyperlipidemia 01/10/2014       LOS (days): 3  Geometric Mean LOS (GMLOS) (days): 3.2  Days to GMLOS:0.2     OBJECTIVE:    Risk of Unplanned Readmission Score: 8.02         Current admission status: Inpatient  Referral Reason: Other (Discharge Planning)    Preferred Pharmacy:   [id number] [address] [address]  Phone: [phone] Fax: [phone]    Primary Care Provider: Ila Braswell MD    Primary Insurance: MEDICARE  Secondary Insurance:  FOR LIFE    ASSESSMENT:  Active Health Care Proxies    There are no active Health Care Proxies on file.       Advance Directives  Does patient have a Health Care POA?: No  Was patient offered paperwork?: Yes (Declined paperwork)  Does patient have Advance Directives?: No  Was patient offered paperwork?: Yes (Declined paperwork)  Primary Contact: Karis Mcgrath (Sister In-Law)  [phone] (Mobile)    Readmission Root Cause  30 Day Readmission: No    Patient Information  Admitted from:: Home  Mental Status: Alert  During Assessment patient was accompanied by: Not accompanied during assessment  Assessment information provided  by:: Patient  Primary Caregiver: Self  Support Systems: Self, Family members  What city do you live in?: [place]  Home entry access options. Select all that apply.: Stairs  Number of steps to enter home.: One Flight  Type of Current Residence: Other (Comment) ([facility] (rents-lived there for a few years))  Living Arrangements: Lives Alone  Is patient a ?: Yes  Is [facility] with [facility]: No (Pt considering becoming active)    Activities of Daily Living Prior to Admission  Functional Status: Independent  Completes ADLs independently?: Yes  Ambulates independently?: Yes  Does patient use assisted devices?: No  Does patient currently own DME?: No  Does patient have a history of Outpatient Therapy (PT/OT)?: No  Does the patient have a history of Short-Term Rehab?: No  Does patient have a history of HHC?: No  Does patient currently have HHC?: No         Patient Information Continued  Income Source: SSI/SSD  Does patient have prescription coverage?: Yes  Does patient receive dialysis treatments?: No  Does patient have a history of substance abuse?: No  Does patient have a history of Mental Health Diagnosis?: No    Means of Transportation  Means of Transport to Appts:: Drives Self      Social Determinants of Health (SDOH)      Flowsheet Row Most Recent Value   Housing Stability    In the last 12 months, was there a time when you were not able to pay the mortgage or rent on time? N   In the past 12 months, how many times have you moved where you were living? 0   At any time in the past 12 months, were you homeless or living in a shelter (including now)? N   Transportation Needs    In the past 12 months, has lack of transportation kept you from medical appointments or from getting medications? no   In the past 12 months, has lack of transportation kept you from meetings, work, or from getting things needed for daily living? No   Food Insecurity    Within the past 12 months,  you worried that your food would run out before you got the money to buy more. Never true   Within the past 12 months, the food you bought just didn't last and you didn't have money to get more. Never true   Utilities    In the past 12 months has the electric, gas, oil, or water company threatened to shut off services in your home? No            DISCHARGE DETAILS:    Discharge planning discussed with:: Patient  Freedom of Choice: Yes  Comments - Freedom of Choice: Patient does not anticipate any needs. States he eventually may need to consider a new living arrangement  CM contacted family/caregiver?: No- see comments (Pt engaged in assessment)     Contacts  Patient Contacts: Karis Mcgrath (Sister In-Law)  [phone] (Mobile)  Relationship to Patient:: Family  Contact Method: Phone  Phone Number: [phone] (Mobile)  Reason/Outcome: Emergency Contact    Requested Home Health Care         Is the patient interested in HHC at discharge?: No    DME Referral Provided  Referral made for DME?: No    Other Referral/Resources/Interventions Provided:  Interventions: Other (Specify) (Medication price check- Eliquis)  Referral Comments: No anticipated needs. Will be starting Eliquis so CM price checked Eliquis. Patient would pay $43 for Eliquis    Treatment Team Recommendation: Home  Discharge Destination Plan:: Home  Transport at Discharge : Self     IMM Given (Date):: 09/27/24  IMM Given to:: Patient  Family notified:: Reviewed with Pt IMM.    CM did price check on Eliquis. Pharmacy stated that Pt would pay $43 for Eliquis.     CM spoke to patient to introduce self, explain role, complete CM assessment, and discuss DC planning. Pt states he has not been too his PCP in over a year. He did recently start following with Dermatologist due to his infection. CM reviewed Eliquis cost of $43. Pt states that price is fine. Pt also stated that he will being consulted to see vascular in [place] next week (following his discharge)  and inquired about any possible transportation for him to get to the appointment. CM will look into any possible options.     CM reviewed IMM with pt; Pt expressed some concern that he will be discharged too soon and won't know what is wrong with the infection he has. Pt in agreement with rights, and is aware of the right to appeal d/c once medically stable if desired. IMM signed.     CM will continue to follow for discharge planning needs.

## 2024-09-27 NOTE — PROGRESS NOTES
Progress Note - Hospitalist   Name: Jose Elias Mcgrath 69 y.o. male I MRN: [id number]  Unit/Bed#: [facility] I Date of Admission: 9/24/2024   Date of Service: 9/27/2024 I Hospital Day: 3    Assessment & Plan  Cellulitis of right lower extremity  Presented to ED with right foot swelling and drainage. States that he has been following with dermatology x 1 year and has been on diflucan previously for what he thought was fungal infection. Recently given keflex and diagnosed with eczema he said. States that he has been having worsening swelling, pain, and redness.   Outpatient therapy trialed with keflex and recently started on methotrexate for his eczema however he has not been taking the methotrexate.   Only started taking keflex around 4 days ago he said, but was prescribed on 9/11.   CT RLE: Extensive subcutaneous edema throughout the foot and ankle which could be cellulitis, without abscess formation. No evidence of osteomyelitis.  Procal normal  Cefepime  Blood cx NG 48 hours  Wound cx pending   Given 1 dose lasix due to swelling. Continue prn  venous duplex negative for DVT  Compression wrapping, elevation  Derm consulted - continue antibiotics and monitor cx. Once controlled, underlying eczema or tinea must be better managed. Consider topical gentamicin. Should follow up outpatient   ID consult -switch to cefepime and monitor cultures and MRSA. Recommend HIV screening and derm eval  Patient refusing HIV screening  Peripheral arterial disease (HCC)  Pedal pulses felt, no claudication and does not follow with vascular outpatient   Arterial duplex with bilateral popliteal artery is aneurysmal with mural thrombus noted.  Evidence of high-grade stenosis versus occlusion and posterior tibial arteries  Vascular consulted - Start heparin drip and plavix. Urgent outpatient follow up on discharge, has appointment in [place] 10/1  D/w cardiology, stop brillinta, can use either aspirin OR plavix while on heparin. Vascular  requesting plavix  On discharge should continue with Plavix and oral anticoagulation like Eliquis  CT abdomen with runoff -right lower extremity: Mild aneurysmal dilatation of the popliteal artery with eccentric mural thrombus.  Inline flow was provided by single-vessel.  Left lower extremity: Aneurysmal dilatation of the left popliteal artery.  Abrupt occlusion of the proximal peroneal artery.  This reconstitutes to provide single-vessel inline flow to foot  Chronic obstructive pulmonary disease (COPD) (HCC)  History of COPD, not currently in acute exacerbation, not on any maintenance medications for this  Benign prostatic hyperplasia with urinary obstruction  Continue flomax  Urinary retention protocol  Hyperlipidemia  Lipitor intolerance, currently on crestor 20 mg every other day and zetia 10 mg daily - continue  Status post primary angioplasty with coronary stent  History of angioplasty with coronary stent, follows with cardiology outpatient currently on aspirin 81 mg daily and brillinta 60 mg bid, zetia and crestor, will continue at this time  Stop brillinta and aspirin, switch to plavix per cardiology and vascular recs   Continue outpatient cardiology follow up   Tobacco user  Encourage smoking cessation, nicotine patch ordered  Dyshidrotic eczema    Onychomycosis      VTE Pharmacologic Prophylaxis: VTE Score: 5 High Risk (Score >/= 5) - Pharmacological DVT Prophylaxis Ordered: heparin drip. Sequential Compression Devices Ordered.    Mobility:   Basic Mobility Inpatient Raw Score: 24  JH-HLM Goal: 8: Walk 250 feet or more  JH-HLM Achieved: 8: Walk 250 feet ot more  JH-HLM Goal achieved. Continue to encourage appropriate mobility.    Patient Centered Rounds: I performed bedside rounds with nursing staff today.   Discussions with Specialists or Other Care Team Provider: nursing, cm    Education and Discussions with Family / Patient: Patient declined call to .     Current Length of Stay: 3  day(s)  Current Patient Status: Inpatient   Certification Statement: The patient will continue to require additional inpatient hospital stay due to requiring iv antibiotics for cellulitis   Discharge Plan: Anticipate discharge in 24-48 hrs to home.    Code Status: Level 1 - Full Code    Subjective   Patient states that his left foot is much better. His right foot is also better but still swollen with some scaling skin. Patient agreeable to outpatient vascular follow up. Denies claudication. Agreeable to current plan.     Objective     Vitals:   Temp (24hrs), Av.6 °F (36.4 °C), Min:97.5 °F (36.4 °C), Max:97.7 °F (36.5 °C)    Temp:  [97.5 °F (36.4 °C)-97.7 °F (36.5 °C)] 97.5 °F (36.4 °C)  HR:  [59-73] 59  Resp:  [15-18] 18  BP: (112-123)/(67-68) 119/68  SpO2:  [95 %-99 %] 99 %  Body mass index is 26.57 kg/m².     Input and Output Summary (last 24 hours):   No intake or output data in the 24 hours ending 24 1239    Physical Exam  Vitals reviewed.   Constitutional:       General: He is not in acute distress.     Appearance: Normal appearance. He is not ill-appearing.   HENT:      Head: Normocephalic and atraumatic.      Nose: Nose normal.      Mouth/Throat:      Mouth: Mucous membranes are moist.      Pharynx: Oropharynx is clear.   Eyes:      Conjunctiva/sclera: Conjunctivae normal.   Cardiovascular:      Rate and Rhythm: Normal rate and regular rhythm.      Pulses: Normal pulses.      Heart sounds: Normal heart sounds. No murmur heard.  Pulmonary:      Effort: Pulmonary effort is normal. No respiratory distress.      Breath sounds: Normal breath sounds. No wheezing.   Abdominal:      General: Abdomen is flat. Bowel sounds are normal. There is no distension.      Palpations: Abdomen is soft.      Tenderness: There is no abdominal tenderness.   Musculoskeletal:         General: Normal range of motion.      Cervical back: Normal range of motion.      Right lower leg: No edema.      Left lower leg: No edema.    Skin:     General: Skin is warm.      Findings: Erythema present.      Comments: Erythema and scaling skin to bilateral feet, R>L   Neurological:      General: No focal deficit present.      Mental Status: He is alert and oriented to person, place, and time. Mental status is at baseline.      Cranial Nerves: No cranial nerve deficit.      Motor: No weakness.   Psychiatric:         Mood and Affect: Mood normal.         Behavior: Behavior normal.         Thought Content: Thought content normal.         Judgment: Judgment normal.          Lines/Drains:  Lines/Drains/Airways       Active Status       None                            Lab Results: I have reviewed the following results:    Results from last 7 days   Lab Units 09/27/24 0458 09/25/24 0450 09/24/24  1515   WBC Thousand/uL 6.40   < > 10.36*   HEMOGLOBIN g/dL 14.0   < > 14.9   HEMATOCRIT % 41.5   < > 44.4   PLATELETS Thousands/uL 200   < > 226   SEGS PCT %  --   --  75   LYMPHO PCT %  --   --  15   MONO PCT %  --   --  7   EOS PCT %  --   --  2    < > = values in this interval not displayed.     Results from last 7 days   Lab Units 09/27/24 0458 09/26/24 0455 09/25/24  0450   SODIUM mmol/L 137   < > 136   POTASSIUM mmol/L 4.0   < > 3.6   CHLORIDE mmol/L 106   < > 104   CO2 mmol/L 25   < > 25   BUN mg/dL 15   < > 13   CREATININE mg/dL 0.82   < > 0.95   ANION GAP mmol/L 6   < > 7   CALCIUM mg/dL 8.7   < > 9.1   ALBUMIN g/dL  --   --  3.8   TOTAL BILIRUBIN mg/dL  --   --  0.55   ALK PHOS U/L  --   --  52   ALT U/L  --   --  13   AST U/L  --   --  15   GLUCOSE RANDOM mg/dL 95   < > 98    < > = values in this interval not displayed.     Results from last 7 days   Lab Units 09/26/24  1334   INR  0.99         Results from last 7 days   Lab Units 09/24/24  1515   HEMOGLOBIN A1C % 5.9*     Results from last 7 days   Lab Units 09/25/24  0450 09/24/24  1515   PROCALCITONIN ng/ml <0.05 <0.05       Recent Cultures (last 7 days):   Results from last 7 days   Lab Units  09/24/24  2118 09/24/24  1555 09/24/24  1515   BLOOD CULTURE   --  No Growth at 48 hrs. No Growth at 48 hrs.   GRAM STAIN RESULT  No polys seen*  1+ Gram negative rods*  --   --    WOUND CULTURE  4+ Growth of Gram Negative [place]*  --   --        Imaging Review: Reviewed radiology reports from this admission including: CT abdomen/pelvis.  Other Studies: No additional pertinent studies reviewed.    Last 24 Hours Medication List:     Current Facility-Administered Medications:     cefepime (MAXIPIME) IVPB (premix in dextrose) 2,000 mg 50 mL, Q8H, Last Rate: 2,000 mg (09/27/24 1040)    clopidogrel (PLAVIX) tablet 75 mg, Daily    co-enzyme Q-10 capsule 200 mg, BID    ezetimibe (ZETIA) tablet 10 mg, Daily    heparin (porcine) 25,000 units in 0.45% NaCl 250 mL infusion (premix), Titrated, Last Rate: 14 Units/kg/hr (09/27/24 1233)    losartan (COZAAR) tablet 25 mg, HS    metoprolol succinate (TOPROL-XL) 24 hr tablet 25 mg, Daily    nicotine (NICODERM CQ) 14 mg/24hr TD 24 hr patch 1 patch, Daily    tamsulosin (FLOMAX) capsule 0.4 mg, QAM    Administrative Statements   Today, Patient Was Seen By: Romelia Gabriel PA-C      **Please Note: This note may have been constructed using a voice recognition system.**

## 2024-09-28 LAB
ANION GAP SERPL CALCULATED.3IONS-SCNC: 6 MMOL/L (ref 4–13)
APTT PPP: 82 SECONDS (ref 23–34)
BACTERIA WND AEROBE CULT: ABNORMAL
BUN SERPL-MCNC: 15 MG/DL (ref 5–25)
CALCIUM SERPL-MCNC: 8.8 MG/DL (ref 8.4–10.2)
CHLORIDE SERPL-SCNC: 105 MMOL/L (ref 96–108)
CO2 SERPL-SCNC: 25 MMOL/L (ref 21–32)
CREAT SERPL-MCNC: 0.88 MG/DL (ref 0.6–1.3)
ERYTHROCYTE [DISTWIDTH] IN BLOOD BY AUTOMATED COUNT: 13.8 % (ref 11.6–15.1)
GFR SERPL CREATININE-BSD FRML MDRD: 86 ML/MIN/1.73SQ M
GLUCOSE SERPL-MCNC: 91 MG/DL (ref 65–140)
GRAM STN SPEC: ABNORMAL
GRAM STN SPEC: ABNORMAL
HCT VFR BLD AUTO: 40.3 % (ref 36.5–49.3)
HGB BLD-MCNC: 13.3 G/DL (ref 12–17)
MCH RBC QN AUTO: 30.7 PG (ref 26.8–34.3)
MCHC RBC AUTO-ENTMCNC: 33 G/DL (ref 31.4–37.4)
MCV RBC AUTO: 93 FL (ref 82–98)
PLATELET # BLD AUTO: 215 THOUSANDS/UL (ref 149–390)
PMV BLD AUTO: 9.6 FL (ref 8.9–12.7)
POTASSIUM SERPL-SCNC: 4 MMOL/L (ref 3.5–5.3)
RBC # BLD AUTO: 4.33 MILLION/UL (ref 3.88–5.62)
SODIUM SERPL-SCNC: 136 MMOL/L (ref 135–147)
WBC # BLD AUTO: 7.03 THOUSAND/UL (ref 4.31–10.16)

## 2024-09-28 PROCEDURE — 85027 COMPLETE CBC AUTOMATED: CPT

## 2024-09-28 PROCEDURE — 85730 THROMBOPLASTIN TIME PARTIAL: CPT | Performed by: FAMILY MEDICINE

## 2024-09-28 PROCEDURE — 99232 SBSQ HOSP IP/OBS MODERATE 35: CPT

## 2024-09-28 PROCEDURE — 80048 BASIC METABOLIC PNL TOTAL CA: CPT

## 2024-09-28 RX ORDER — FUROSEMIDE 10 MG/ML
20 INJECTION INTRAMUSCULAR; INTRAVENOUS ONCE
Status: COMPLETED | OUTPATIENT
Start: 2024-09-28 | End: 2024-09-28

## 2024-09-28 RX ORDER — TRIAMCINOLONE ACETONIDE 1 MG/G
CREAM TOPICAL 2 TIMES DAILY
Status: DISCONTINUED | OUTPATIENT
Start: 2024-09-28 | End: 2024-09-30 | Stop reason: HOSPADM

## 2024-09-28 RX ORDER — MOMETASONE FUROATE 1 MG/ML
1 SOLUTION TOPICAL 2 TIMES DAILY PRN
COMMUNITY

## 2024-09-28 RX ADMIN — CEFEPIME HYDROCHLORIDE 2000 MG: 2 INJECTION, SOLUTION INTRAVENOUS at 10:27

## 2024-09-28 RX ADMIN — HEPARIN SODIUM 14 UNITS/KG/HR: 10000 INJECTION, SOLUTION INTRAVENOUS at 10:12

## 2024-09-28 RX ADMIN — CEFEPIME HYDROCHLORIDE 2000 MG: 2 INJECTION, SOLUTION INTRAVENOUS at 01:42

## 2024-09-28 RX ADMIN — LOSARTAN POTASSIUM 25 MG: 25 TABLET, FILM COATED ORAL at 20:59

## 2024-09-28 RX ADMIN — CEFEPIME HYDROCHLORIDE 2000 MG: 2 INJECTION, SOLUTION INTRAVENOUS at 17:32

## 2024-09-28 RX ADMIN — NICOTINE 1 PATCH: 14 PATCH, EXTENDED RELEASE TRANSDERMAL at 10:13

## 2024-09-28 RX ADMIN — METOPROLOL SUCCINATE 25 MG: 25 TABLET, EXTENDED RELEASE ORAL at 10:14

## 2024-09-28 RX ADMIN — EZETIMIBE 10 MG: 10 TABLET ORAL at 10:14

## 2024-09-28 RX ADMIN — FUROSEMIDE 20 MG: 10 INJECTION, SOLUTION INTRAMUSCULAR; INTRAVENOUS at 12:04

## 2024-09-28 RX ADMIN — Medication 200 MG: at 17:03

## 2024-09-28 RX ADMIN — CLOPIDOGREL 75 MG: 75 TABLET ORAL at 10:14

## 2024-09-28 RX ADMIN — Medication 200 MG: at 10:13

## 2024-09-28 RX ADMIN — TAMSULOSIN HYDROCHLORIDE 0.4 MG: 0.4 CAPSULE ORAL at 10:14

## 2024-09-28 NOTE — PLAN OF CARE
Problem: PAIN - ADULT  Goal: Verbalizes/displays adequate comfort level or baseline comfort level  Description: Interventions:  - Encourage patient to monitor pain and request assistance  - Assess pain using appropriate pain scale  - Administer analgesics based on type and severity of pain and evaluate response  - Implement non-pharmacological measures as appropriate and evaluate response  - Consider cultural and social influences on pain and pain management  - Notify physician/advanced practitioner if interventions unsuccessful or patient reports new pain  9/27/2024 2355 by Angela Isidro RN  Outcome: Progressing  9/27/2024 2247 by Angela Isidro RN  Outcome: Progressing     Problem: INFECTION - ADULT  Goal: Absence or prevention of progression during hospitalization  Description: INTERVENTIONS:  - Assess and monitor for signs and symptoms of infection  - Monitor lab/diagnostic results  - Monitor all insertion sites, i.e. indwelling lines, tubes, and drains  - Monitor endotracheal if appropriate and nasal secretions for changes in amount and color  - [place] appropriate cooling/warming therapies per order  - Administer medications as ordered  - Instruct and encourage patient and family to use good hand hygiene technique  - Identify and instruct in appropriate isolation precautions for identified infection/condition  9/27/2024 2355 by Angela Isidro RN  Outcome: Progressing  9/27/2024 2247 by Angela Isidro RN  Outcome: Progressing

## 2024-09-28 NOTE — ASSESSMENT & PLAN NOTE
Presented to ED with right foot swelling and drainage. States that he has been following with dermatology x 1 year and has been on diflucan previously for what he thought was fungal infection. Recently given keflex and diagnosed with eczema he said. States that he has been having worsening swelling, pain, and redness.   Outpatient therapy trialed with keflex and recently started on methotrexate for his eczema however he has not been taking the methotrexate.   Only started taking keflex around 4 days ago he said, but was prescribed on 9/11.   CT RLE: Extensive subcutaneous edema throughout the foot and ankle which could be cellulitis, without abscess formation. No evidence of osteomyelitis.  Procal normal  Cefepime  Blood cx NG 72 hours  Wound cx: myroides odoratus, enterobacter cloacae, stenotrophomonas maltophilia, presumptive acinetobacter courvalinii species, corynebacterium amycolatum  Given 1 dose lasix due to swelling. Continue prn  venous duplex negative for DVT  Compression wrapping, elevation  Derm consulted - continue antibiotics and monitor cx. Once controlled, underlying eczema or tinea must be better managed. Consider topical gentamicin. Should follow up outpatient   ID consult -switch to cefepime and monitor cultures and MRSA. Recommend HIV screening and derm eval  Patient refusing HIV screening

## 2024-09-28 NOTE — ASSESSMENT & PLAN NOTE
New onset for one year, affecting both hands and feet, confirmed on biopsy in June. Symptoms have failed to improve with topical steroids and has been treated with keflex for positive skin cx in the past. Recently started on methotrexate on 9/11 however patient has refused to take.    Appreciate dermatology evaluation, will need fu as outpatient for improved eczema control as well as consideration for repeat biopsy for routine and fungal c/s, previous plan to start methotrexate, consider allergy/immunology evaluation as outpatient   Recommend HIV screen, patient is declining at present

## 2024-09-28 NOTE — PROGRESS NOTES
Progress Note - Hospitalist   Name: Jose Elias Mcgrath 69 y.o. male I MRN: [id number]  Unit/Bed#: -[facility] I Date of Admission: 9/24/2024   Date of Service: 9/28/2024 I Hospital Day: 4    Assessment & Plan  Cellulitis of right lower extremity  Presented to ED with right foot swelling and drainage. States that he has been following with dermatology x 1 year and has been on diflucan previously for what he thought was fungal infection. Recently given keflex and diagnosed with eczema he said. States that he has been having worsening swelling, pain, and redness.   Outpatient therapy trialed with keflex and recently started on methotrexate for his eczema however he has not been taking the methotrexate.   Only started taking keflex around 4 days ago he said, but was prescribed on 9/11.   CT RLE: Extensive subcutaneous edema throughout the foot and ankle which could be cellulitis, without abscess formation. No evidence of osteomyelitis.  Procal normal  Cefepime  Blood cx NG 72 hours  Wound cx: myroides odoratus, enterobacter cloacae, stenotrophomonas maltophilia, presumptive acinetobacter courvalinii species, corynebacterium amycolatum  Given 1 dose lasix due to swelling. Continue prn  venous duplex negative for DVT  Compression wrapping, elevation  Derm consulted - continue antibiotics and monitor cx. Once controlled, underlying eczema or tinea must be better managed. Consider topical gentamicin. Should follow up outpatient   ID consult -switch to cefepime and monitor cultures and MRSA. Recommend HIV screening and derm eval  Patient refusing HIV screening  Peripheral arterial disease (HCC)  Pedal pulses felt, no claudication and does not follow with vascular outpatient   Arterial duplex with bilateral popliteal artery is aneurysmal with mural thrombus noted.  Evidence of high-grade stenosis versus occlusion and posterior tibial arteries  Vascular consulted - Start heparin drip and plavix. Urgent outpatient follow up on  discharge, has appointment in [place] 10/1  D/w cardiology, stop brillinta, can use either aspirin OR plavix while on heparin. Vascular requesting plavix  On discharge should continue with Plavix and oral anticoagulation like Eliquis  Patient agreeable to cost on discharge.   Vascular appointment scheduled, discussed with CM to assist with transportation for patient.   CT abdomen with runoff -right lower extremity: Mild aneurysmal dilatation of the popliteal artery with eccentric mural thrombus.  Inline flow was provided by single-vessel.  Left lower extremity: Aneurysmal dilatation of the left popliteal artery.  Abrupt occlusion of the proximal peroneal artery.  This reconstitutes to provide single-vessel inline flow to foot  Chronic obstructive pulmonary disease (COPD) (HCC)  History of COPD, not currently in acute exacerbation, not on any maintenance medications for this  Benign prostatic hyperplasia with urinary obstruction  Continue flomax  Urinary retention protocol  Hyperlipidemia  Lipitor intolerance, currently on crestor 20 mg every other day and zetia 10 mg daily - continue  Status post primary angioplasty with coronary stent  History of angioplasty with coronary stent, follows with cardiology outpatient currently on aspirin 81 mg daily and brillinta 60 mg bid, zetia and crestor, will continue at this time  Stop brillinta and aspirin, switch to plavix per cardiology and vascular recs   Continue outpatient cardiology follow up   Tobacco user  Encourage smoking cessation, nicotine patch ordered  Dyshidrotic eczema  New onset for one year, affecting both hands and feet, confirmed on biopsy in June. Symptoms have failed to improve with topical steroids and has been treated with keflex for positive skin cx in the past. Recently started on methotrexate on 9/11 however patient has refused to take.    Appreciate dermatology evaluation, will need fu as outpatient for improved eczema control as well as  consideration for repeat biopsy for routine and fungal c/s, previous plan to start methotrexate, consider allergy/immunology evaluation as outpatient   Recommend HIV screen, patient is declining at present  Onychomycosis      VTE Pharmacologic Prophylaxis: VTE Score: 5 High Risk (Score >/= 5) - Pharmacological DVT Prophylaxis Ordered: heparin drip. Sequential Compression Devices Ordered.    Mobility:   Basic Mobility Inpatient Raw Score: 23  JH-HLM Goal: 7: Walk 25 feet or more  JH-HLM Achieved: 8: Walk 250 feet ot more  JH-HLM Goal achieved. Continue to encourage appropriate mobility.    Patient Centered Rounds: I performed bedside rounds with nursing staff today.   Discussions with Specialists or Other Care Team Provider: nursing, case management    Education and Discussions with Family / Patient: Patient declined call to .     Current Length of Stay: 4 day(s)  Current Patient Status: Inpatient   Certification Statement: The patient will continue to require additional inpatient hospital stay due to cellulitis  Discharge Plan: Anticipate discharge in 24-48 hrs to home.    Code Status: Level 1 - Full Code    Subjective   Seen and examined today. States that he feels ok. Still with more swelling to the right leg. Said that he feels the skin is still peeling off. Agreeable to vascular appointment still but a little concerned about transportation. No other complaints currently.     Objective     Vitals:   Temp (24hrs), Av.3 °F (36.3 °C), Min:96.6 °F (35.9 °C), Max:97.7 °F (36.5 °C)    Temp:  [96.6 °F (35.9 °C)-97.7 °F (36.5 °C)] 96.6 °F (35.9 °C)  HR:  [57-64] 64  Resp:  [17-19] 19  BP: (108-114)/(70-74) 110/70  SpO2:  [98 %-99 %] 98 %  Body mass index is 26.57 kg/m².     Input and Output Summary (last 24 hours):     Intake/Output Summary (Last 24 hours) at 2024 1531  Last data filed at 2024 1335  Gross per 24 hour   Intake 499.6 ml   Output 750 ml   Net -250.4 ml       Physical Exam  Vitals  "reviewed.   Constitutional:       General: He is not in acute distress.     Appearance: He is ill-appearing. He is not toxic-appearing.   HENT:      Head: Normocephalic and atraumatic.      Mouth/Throat:      Mouth: Mucous membranes are moist.   Cardiovascular:      Rate and Rhythm: Normal rate and regular rhythm.      Heart sounds: No murmur heard.  Pulmonary:      Effort: No respiratory distress.      Breath sounds: No stridor. No wheezing, rhonchi or rales.   Abdominal:      General: Bowel sounds are normal. There is no distension.      Palpations: Abdomen is soft. There is no mass.      Tenderness: There is no abdominal tenderness.   Musculoskeletal:      Right lower leg: Edema present.      Left lower leg: No edema.   Skin:     General: Skin is warm and dry.      Findings: Erythema (Erythema and scaling skin to bilateral feet, R>L) present.   Neurological:      General: No focal deficit present.      Mental Status: He is alert and oriented to person, place, and time.   Psychiatric:         Mood and Affect: Mood normal.         Behavior: Behavior normal.          Lines/Drains:  Lines/Drains/Airways       Active Status       None                            Lab Results: I have reviewed the following results: CBC/BMP:   .     09/28/24 0422   WBC 7.03   HGB 13.3   HCT 40.3      SODIUM 136   K 4.0      CO2 25   BUN 15   CREATININE 0.88   GLUC 91    , Wound [place]ure: No results found for: \"WOUNDCULT\"   Results from last 7 days   Lab Units 09/28/24 0422 09/25/24 0450 09/24/24  1515   WBC Thousand/uL 7.03   < > 10.36*   HEMOGLOBIN g/dL 13.3   < > 14.9   HEMATOCRIT % 40.3   < > 44.4   PLATELETS Thousands/uL 215   < > 226   SEGS PCT %  --   --  75   LYMPHO PCT %  --   --  15   MONO PCT %  --   --  7   EOS PCT %  --   --  2    < > = values in this interval not displayed.     Results from last 7 days   Lab Units 09/28/24 0422 09/26/24 0455 09/25/24  0450   SODIUM mmol/L 136   < > 136   POTASSIUM mmol/L 4.0   " < > 3.6   CHLORIDE mmol/L 105   < > 104   CO2 mmol/L 25   < > 25   BUN mg/dL 15   < > 13   CREATININE mg/dL 0.88   < > 0.95   ANION GAP mmol/L 6   < > 7   CALCIUM mg/dL 8.8   < > 9.1   ALBUMIN g/dL  --   --  3.8   TOTAL BILIRUBIN mg/dL  --   --  0.55   ALK PHOS U/L  --   --  52   ALT U/L  --   --  13   AST U/L  --   --  15   GLUCOSE RANDOM mg/dL 91   < > 98    < > = values in this interval not displayed.     Results from last 7 days   Lab Units 09/26/24  1334   INR  0.99         Results from last 7 days   Lab Units 09/24/24  1515   HEMOGLOBIN A1C % 5.9*     Results from last 7 days   Lab Units 09/25/24  0450 09/24/24  1515   PROCALCITONIN ng/ml <0.05 <0.05       Recent Cultures (last 7 days):   Results from last 7 days   Lab Units 09/24/24  2118 09/24/24  1555 09/24/24  1515   BLOOD CULTURE   --  No Growth at 72 hrs. No Growth at 72 hrs.   GRAM STAIN RESULT  No polys seen*  1+ Gram negative rods*  --   --    WOUND CULTURE  4+ Growth of Myroides odoratus*  1+ Growth of Enterobacter cloacae*  2+ Growth of Stenotrophomonas maltophilia*  2+ Growth of - Presumptive Acinetobacter courvalinii Acinetobacter species*  4+ Growth of Corynebacterium amycolatum*  --   --        Imaging Review: Reviewed radiology reports from this admission including: CTA abdomen with runoff, arterial and venous dupex.  Other Studies: No additional pertinent studies reviewed.    Last 24 Hours Medication List:     Current Facility-Administered Medications:     cefepime (MAXIPIME) IVPB (premix in dextrose) 2,000 mg 50 mL, Q8H, Last Rate: Stopped (09/28/24 1115)    clopidogrel (PLAVIX) tablet 75 mg, Daily    co-enzyme Q-10 capsule 200 mg, BID    ezetimibe (ZETIA) tablet 10 mg, Daily    heparin (porcine) 25,000 units in 0.45% NaCl 250 mL infusion (premix), Titrated, Last Rate: 14 Units/kg/hr (09/28/24 1012)    losartan (COZAAR) tablet 25 mg, HS    metoprolol succinate (TOPROL-XL) 24 hr tablet 25 mg, Daily    nicotine (NICODERM CQ) 14 mg/24hr TD  24 hr patch 1 patch, Daily    tamsulosin (FLOMAX) capsule 0.4 mg, QAM    triamcinolone (KENALOG) 0.1 % cream, BID    Administrative Statements   Today, Patient Was Seen By: Ashley Mercado PA-C    **Please Note: This note may have been constructed using a voice recognition system.**

## 2024-09-28 NOTE — ASSESSMENT & PLAN NOTE
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  Evidence of high-grade stenosis versus occlusion and posterior tibial arteries  Vascular consulted - Start heparin drip and plavix. Urgent outpatient follow up on discharge, has appointment in [place] 10/1  D/w cardiology, stop brillinta, can use either aspirin OR plavix while on heparin. Vascular requesting plavix  On discharge should continue with Plavix and oral anticoagulation like Eliquis  Patient agreeable to cost on discharge.   Vascular appointment scheduled, discussed with CM to assist with transportation for patient.   CT abdomen with runoff -right lower extremity: Mild aneurysmal dilatation of the popliteal artery with eccentric mural thrombus.  Inline flow was provided by single-vessel.  Left lower extremity: Aneurysmal dilatation of the left popliteal artery.  Abrupt occlusion of the proximal peroneal artery.  This reconstitutes to provide single-vessel inline flow to foot

## 2024-09-28 NOTE — PLAN OF CARE
Problem: INFECTION - ADULT  Goal: Absence or prevention of progression during hospitalization  Description: INTERVENTIONS:  - Assess and monitor for signs and symptoms of infection increased swelling redness pain RLE  - Monitor lab/diagnostic results  - Monitor all insertion sites, i.e. indwelling lines, tubes, and drains  - Monitor endotracheal if appropriate and nasal secretions for changes in amount and color  - [place] appropriate cooling/warming therapies per order  - Administer medications as ordered  - Instruct and encourage patient and family to use good hand hygiene technique  - Identify and instruct in appropriate isolation precautions for identified infection/condition  Outcome: Progressing

## 2024-09-29 LAB
ANION GAP SERPL CALCULATED.3IONS-SCNC: 5 MMOL/L (ref 4–13)
APTT PPP: 81 SECONDS (ref 23–34)
BUN SERPL-MCNC: 14 MG/DL (ref 5–25)
CALCIUM SERPL-MCNC: 8.7 MG/DL (ref 8.4–10.2)
CHLORIDE SERPL-SCNC: 105 MMOL/L (ref 96–108)
CO2 SERPL-SCNC: 25 MMOL/L (ref 21–32)
CREAT SERPL-MCNC: 0.84 MG/DL (ref 0.6–1.3)
ERYTHROCYTE [DISTWIDTH] IN BLOOD BY AUTOMATED COUNT: 13.7 % (ref 11.6–15.1)
GFR SERPL CREATININE-BSD FRML MDRD: 88 ML/MIN/1.73SQ M
GLUCOSE SERPL-MCNC: 93 MG/DL (ref 65–140)
HCT VFR BLD AUTO: 40.6 % (ref 36.5–49.3)
HGB BLD-MCNC: 13.5 G/DL (ref 12–17)
MCH RBC QN AUTO: 30.9 PG (ref 26.8–34.3)
MCHC RBC AUTO-ENTMCNC: 33.3 G/DL (ref 31.4–37.4)
MCV RBC AUTO: 93 FL (ref 82–98)
PLATELET # BLD AUTO: 208 THOUSANDS/UL (ref 149–390)
PMV BLD AUTO: 9.4 FL (ref 8.9–12.7)
POTASSIUM SERPL-SCNC: 3.6 MMOL/L (ref 3.5–5.3)
RBC # BLD AUTO: 4.37 MILLION/UL (ref 3.88–5.62)
SODIUM SERPL-SCNC: 135 MMOL/L (ref 135–147)
WBC # BLD AUTO: 6.9 THOUSAND/UL (ref 4.31–10.16)

## 2024-09-29 PROCEDURE — 80048 BASIC METABOLIC PNL TOTAL CA: CPT

## 2024-09-29 PROCEDURE — 99232 SBSQ HOSP IP/OBS MODERATE 35: CPT

## 2024-09-29 PROCEDURE — 85027 COMPLETE CBC AUTOMATED: CPT

## 2024-09-29 PROCEDURE — 85730 THROMBOPLASTIN TIME PARTIAL: CPT | Performed by: FAMILY MEDICINE

## 2024-09-29 RX ADMIN — CEFEPIME HYDROCHLORIDE 2000 MG: 2 INJECTION, SOLUTION INTRAVENOUS at 17:41

## 2024-09-29 RX ADMIN — Medication 200 MG: at 17:41

## 2024-09-29 RX ADMIN — HEPARIN SODIUM 14 UNITS/KG/HR: 10000 INJECTION, SOLUTION INTRAVENOUS at 09:40

## 2024-09-29 RX ADMIN — CEFEPIME HYDROCHLORIDE 2000 MG: 2 INJECTION, SOLUTION INTRAVENOUS at 09:44

## 2024-09-29 RX ADMIN — TAMSULOSIN HYDROCHLORIDE 0.4 MG: 0.4 CAPSULE ORAL at 09:42

## 2024-09-29 RX ADMIN — LOSARTAN POTASSIUM 25 MG: 25 TABLET, FILM COATED ORAL at 21:22

## 2024-09-29 RX ADMIN — CLOPIDOGREL 75 MG: 75 TABLET ORAL at 09:43

## 2024-09-29 RX ADMIN — CEFEPIME HYDROCHLORIDE 2000 MG: 2 INJECTION, SOLUTION INTRAVENOUS at 02:19

## 2024-09-29 RX ADMIN — Medication 200 MG: at 09:42

## 2024-09-29 RX ADMIN — NICOTINE 1 PATCH: 14 PATCH, EXTENDED RELEASE TRANSDERMAL at 09:47

## 2024-09-29 RX ADMIN — EZETIMIBE 10 MG: 10 TABLET ORAL at 09:43

## 2024-09-29 NOTE — ASSESSMENT & PLAN NOTE
Presented to ED with right foot swelling and drainage. States that he has been following with dermatology x 1 year and has been on diflucan previously for what he thought was fungal infection. Recently given keflex and diagnosed with eczema he said. States that he has been having worsening swelling, pain, and redness.   Outpatient therapy trialed with keflex and recently started on methotrexate for his eczema however he has not been taking the methotrexate.   Only started taking keflex around 4 days ago he said, but was prescribed on 9/11.   CT RLE: Extensive subcutaneous edema throughout the foot and ankle which could be cellulitis, without abscess formation. No evidence of osteomyelitis.  Procal normal  Cefepime  Blood cx NG 72 hours  Wound cx: myroides odoratus, enterobacter cloacae, stenotrophomonas maltophilia, presumptive acinetobacter courvalinii species, corynebacterium amycolatum  Given 1 dose lasix due to swelling. Continue prn  venous duplex negative for DVT  Compression wrapping, elevation  Derm consulted - continue antibiotics and monitor cx. Once controlled, underlying eczema or tinea must be better managed. Consider topical gentamicin. Should follow up outpatient   ID consult -switch to cefepime and monitor cultures and MRSA. Recommend HIV screening and derm eval  Patient refusing HIV screening  Pending final abx recommendations for discharge.

## 2024-09-29 NOTE — ASSESSMENT & PLAN NOTE
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  Evidence of high-grade stenosis versus occlusion and posterior tibial arteries  Vascular consulted - Start heparin drip and plavix. Urgent outpatient follow up on discharge, has appointment in [place] 10/1  D/w cardiology, stop brillinta, can use either aspirin OR plavix while on heparin. Vascular requesting plavix  On discharge should continue with Plavix and oral anticoagulation like Eliquis  Patient agreeable to cost on discharge.   Vascular appointment scheduled, discussed with CM to assist with transportation for patient.   CT abdomen with runoff -right lower extremity: Mild aneurysmal dilatation of the popliteal artery with eccentric mural thrombus.  Inline flow was provided by single-vessel.  Left lower extremity: Aneurysmal dilatation of the left popliteal artery.  Abrupt occlusion of the proximal peroneal artery.  This reconstitutes to provide single-vessel inline flow to foot

## 2024-09-29 NOTE — PROGRESS NOTES
Progress Note - Hospitalist   Name: Jose Elias Mcgrath 69 y.o. male I MRN: [id number]  Unit/Bed#: -01 I Date of Admission: 9/24/2024   Date of Service: 9/29/2024 I Hospital Day: 5    Assessment & Plan  Cellulitis of right lower extremity  Presented to ED with right foot swelling and drainage. States that he has been following with dermatology x 1 year and has been on diflucan previously for what he thought was fungal infection. Recently given keflex and diagnosed with eczema he said. States that he has been having worsening swelling, pain, and redness.   Outpatient therapy trialed with keflex and recently started on methotrexate for his eczema however he has not been taking the methotrexate.   Only started taking keflex around 4 days ago he said, but was prescribed on 9/11.   CT RLE: Extensive subcutaneous edema throughout the foot and ankle which could be cellulitis, without abscess formation. No evidence of osteomyelitis.  Procal normal  Cefepime  Blood cx NG 72 hours  Wound cx: myroides odoratus, enterobacter cloacae, stenotrophomonas maltophilia, presumptive acinetobacter courvalinii species, corynebacterium amycolatum  Given 1 dose lasix due to swelling. Continue prn  venous duplex negative for DVT  Compression wrapping, elevation  Derm consulted - continue antibiotics and monitor cx. Once controlled, underlying eczema or tinea must be better managed. Consider topical gentamicin. Should follow up outpatient   ID consult -switch to cefepime and monitor cultures and MRSA. Recommend HIV screening and derm eval  Patient refusing HIV screening  Pending final abx recommendations for discharge.   Peripheral arterial disease (HCC)  Pedal pulses felt, no claudication and does not follow with vascular outpatient   Arterial duplex with bilateral popliteal artery is aneurysmal with mural thrombus noted.  Evidence of high-grade stenosis versus occlusion and posterior tibial arteries  Vascular consulted - Start  heparin drip and plavix. Urgent outpatient follow up on discharge, has appointment in [place] 10/1  D/w cardiology, stop brillinta, can use either aspirin OR plavix while on heparin. Vascular requesting plavix  On discharge should continue with Plavix and oral anticoagulation like Eliquis  Patient agreeable to cost on discharge.   Vascular appointment scheduled, discussed with CM to assist with transportation for patient.   CT abdomen with runoff -right lower extremity: Mild aneurysmal dilatation of the popliteal artery with eccentric mural thrombus.  Inline flow was provided by single-vessel.  Left lower extremity: Aneurysmal dilatation of the left popliteal artery.  Abrupt occlusion of the proximal peroneal artery.  This reconstitutes to provide single-vessel inline flow to foot  Chronic obstructive pulmonary disease (COPD) (HCC)  History of COPD, not currently in acute exacerbation, not on any maintenance medications for this  Benign prostatic hyperplasia with urinary obstruction  Continue flomax  Urinary retention protocol  Hyperlipidemia  Lipitor intolerance, currently on crestor 20 mg every other day and zetia 10 mg daily - continue  Status post primary angioplasty with coronary stent  History of angioplasty with coronary stent, follows with cardiology outpatient currently on aspirin 81 mg daily and brillinta 60 mg bid, zetia and crestor, will continue at this time  Stop brillinta and aspirin, switch to plavix per cardiology and vascular recs   Continue outpatient cardiology follow up   Tobacco user  Encourage smoking cessation, nicotine patch ordered  Dyshidrotic eczema  New onset for one year, affecting both hands and feet, confirmed on biopsy in June. Symptoms have failed to improve with topical steroids and has been treated with keflex for positive skin cx in the past. Recently started on methotrexate on 9/11 however patient has refused to take.    Appreciate dermatology evaluation, will need fu as  outpatient for improved eczema control as well as consideration for repeat biopsy for routine and fungal c/s, previous plan to start methotrexate, consider allergy/immunology evaluation as outpatient   Recommend HIV screen, patient is declining at present  Onychomycosis      VTE Pharmacologic Prophylaxis: VTE Score: 5 High Risk (Score >/= 5) - Pharmacological DVT Prophylaxis Ordered: heparin drip. Sequential Compression Devices Ordered.    Mobility:   Basic Mobility Inpatient Raw Score: 20  JH-HLM Goal: 6: Walk 10 steps or more  JH-HLM Achieved: 7: Walk 25 feet or more (pt ambulated to the bathroom)  JH-HLM Goal achieved. Continue to encourage appropriate mobility.    Patient Centered Rounds: I performed bedside rounds with nursing staff today.   Discussions with Specialists or Other Care Team Provider: nursing, case management    Education and Discussions with Family / Patient: Patient declined call to .     Current Length of Stay: 5 day(s)  Current Patient Status: Inpatient   Certification Statement: The patient will continue to require additional inpatient hospital stay due to cellulitis, iv abx  Discharge Plan: Anticipate discharge tomorrow to home.    Code Status: Level 1 - Full Code    Subjective   Seen and examined today. Said that he is tired today. Still concerned about going to vascular appointment and transportation. Patient questioning why his hands are not getting better with antibiotics, explained that because this was not bacterial infection on his hands but appeared to be eczema on his hands, the antibiotics are not going to be treating that. Verbalized understanding.     Objective     Vitals:   Temp (24hrs), Av.9 °F (36.1 °C), Min:96.6 °F (35.9 °C), Max:97.2 °F (36.2 °C)    Temp:  [96.6 °F (35.9 °C)-97.2 °F (36.2 °C)] 97.2 °F (36.2 °C)  HR:  [57-65] 57  Resp:  [16-19] 19  BP: (100-115)/(58-95) 100/63  SpO2:  [97 %-100 %] 100 %  Body mass index is 26.57 kg/m².     Input and Output  Summary (last 24 hours):     Intake/Output Summary (Last 24 hours) at 9/29/2024 1327  Last data filed at 9/28/2024 1732  Gross per 24 hour   Intake 809.6 ml   Output --   Net 809.6 ml       Physical Exam  Vitals reviewed.   Constitutional:       General: He is not in acute distress.     Appearance: He is ill-appearing. He is not toxic-appearing.   HENT:      Head: Normocephalic and atraumatic.      Mouth/Throat:      Mouth: Mucous membranes are moist.   Cardiovascular:      Rate and Rhythm: Normal rate and regular rhythm.      Heart sounds: No murmur heard.  Pulmonary:      Effort: No respiratory distress.      Breath sounds: No stridor. No wheezing.   Abdominal:      General: Bowel sounds are normal. There is no distension.      Palpations: Abdomen is soft. There is no mass.      Tenderness: There is no abdominal tenderness.   Musculoskeletal:         General: No tenderness.      Right lower leg: Edema present.      Left lower leg: No edema.   Skin:     General: Skin is warm and dry.      Findings: Erythema (Erythema and scaling skin to bilateral feet, R>L) present.      Comments: Scaling to bilateral palms as well   Neurological:      General: No focal deficit present.      Mental Status: He is alert and oriented to person, place, and time.   Psychiatric:         Mood and Affect: Mood normal.         Behavior: Behavior normal.          Lines/Drains:  Lines/Drains/Airways       Active Status       None                            Lab Results: I have reviewed the following results: CBC/BMP:   .     09/29/24  0445   WBC 6.90   HGB 13.5   HCT 40.6      SODIUM 135   K 3.6      CO2 25   BUN 14   CREATININE 0.84   GLUC 93       Results from last 7 days   Lab Units 09/29/24  0445 09/25/24  0450 09/24/24  1515   WBC Thousand/uL 6.90   < > 10.36*   HEMOGLOBIN g/dL 13.5   < > 14.9   HEMATOCRIT % 40.6   < > 44.4   PLATELETS Thousands/uL 208   < > 226   SEGS PCT %  --   --  75   LYMPHO PCT %  --   --  15   MONO PCT  %  --   --  7   EOS PCT %  --   --  2    < > = values in this interval not displayed.     Results from last 7 days   Lab Units 09/29/24  0445 09/26/24  0455 09/25/24  0450   SODIUM mmol/L 135   < > 136   POTASSIUM mmol/L 3.6   < > 3.6   CHLORIDE mmol/L 105   < > 104   CO2 mmol/L 25   < > 25   BUN mg/dL 14   < > 13   CREATININE mg/dL 0.84   < > 0.95   ANION GAP mmol/L 5   < > 7   CALCIUM mg/dL 8.7   < > 9.1   ALBUMIN g/dL  --   --  3.8   TOTAL BILIRUBIN mg/dL  --   --  0.55   ALK PHOS U/L  --   --  52   ALT U/L  --   --  13   AST U/L  --   --  15   GLUCOSE RANDOM mg/dL 93   < > 98    < > = values in this interval not displayed.     Results from last 7 days   Lab Units 09/26/24  1334   INR  0.99         Results from last 7 days   Lab Units 09/24/24  1515   HEMOGLOBIN A1C % 5.9*     Results from last 7 days   Lab Units 09/25/24  0450 09/24/24  1515   PROCALCITONIN ng/ml <0.05 <0.05       Recent Cultures (last 7 days):   Results from last 7 days   Lab Units 09/24/24  2118 09/24/24  1555 09/24/24  1515   BLOOD CULTURE   --  No Growth After 4 Days. No Growth After 4 Days.   GRAM STAIN RESULT  No polys seen*  1+ Gram negative rods*  --   --    WOUND CULTURE  4+ Growth of Myroides odoratus*  1+ Growth of Enterobacter cloacae*  2+ Growth of Stenotrophomonas maltophilia*  2+ Growth of - Presumptive Acinetobacter courvalinii Acinetobacter species*  4+ Growth of Corynebacterium amycolatum*  --   --        Imaging Review: No pertinent imaging studies reviewed.  Other Studies: No additional pertinent studies reviewed.    Last 24 Hours Medication List:     Current Facility-Administered Medications:     cefepime (MAXIPIME) IVPB (premix in dextrose) 2,000 mg 50 mL, Q8H, Last Rate: 2,000 mg (09/29/24 0944)    clopidogrel (PLAVIX) tablet 75 mg, Daily    co-enzyme Q-10 capsule 200 mg, BID    ezetimibe (ZETIA) tablet 10 mg, Daily    heparin (porcine) 25,000 units in 0.45% NaCl 250 mL infusion (premix), Titrated, Last Rate: 14  Units/kg/hr (09/29/24 0940)    losartan (COZAAR) tablet 25 mg, HS    metoprolol succinate (TOPROL-XL) 24 hr tablet 25 mg, Daily    nicotine (NICODERM CQ) 14 mg/24hr TD 24 hr patch 1 patch, Daily    tamsulosin (FLOMAX) capsule 0.4 mg, QAM    triamcinolone (KENALOG) 0.1 % cream, BID    Administrative Statements   Today, Patient Was Seen By: Ashley Mercado PA-C    **Please Note: This note may have been constructed using a voice recognition system.**

## 2024-09-29 NOTE — PLAN OF CARE
Problem: SKIN/TISSUE INTEGRITY - ADULT  Goal: Incision(s), wounds(s) or drain site(s) healing without S/S of infection  Description: INTERVENTIONS  - Assess and document dressing, incision, wound bed, drain sites and surrounding tissue  - Provide patient and family education  - Perform skin care/dressing changes every shift  Outcome: Progressing

## 2024-09-30 VITALS
BODY MASS INDEX: 26.64 KG/M2 | OXYGEN SATURATION: 97 % | HEART RATE: 56 BPM | SYSTOLIC BLOOD PRESSURE: 107 MMHG | WEIGHT: 179.9 LBS | HEIGHT: 69 IN | DIASTOLIC BLOOD PRESSURE: 65 MMHG | TEMPERATURE: 96.8 F | RESPIRATION RATE: 18 BRPM

## 2024-09-30 LAB
ANION GAP SERPL CALCULATED.3IONS-SCNC: 6 MMOL/L (ref 4–13)
APTT PPP: 69 SECONDS (ref 23–34)
APTT PPP: 70 SECONDS (ref 23–34)
BACTERIA BLD CULT: NORMAL
BACTERIA BLD CULT: NORMAL
BUN SERPL-MCNC: 15 MG/DL (ref 5–25)
CALCIUM SERPL-MCNC: 8.8 MG/DL (ref 8.4–10.2)
CHLORIDE SERPL-SCNC: 105 MMOL/L (ref 96–108)
CO2 SERPL-SCNC: 24 MMOL/L (ref 21–32)
CREAT SERPL-MCNC: 0.86 MG/DL (ref 0.6–1.3)
ERYTHROCYTE [DISTWIDTH] IN BLOOD BY AUTOMATED COUNT: 14 % (ref 11.6–15.1)
GFR SERPL CREATININE-BSD FRML MDRD: 87 ML/MIN/1.73SQ M
GLUCOSE SERPL-MCNC: 97 MG/DL (ref 65–140)
HCT VFR BLD AUTO: 40.7 % (ref 36.5–49.3)
HGB BLD-MCNC: 13.4 G/DL (ref 12–17)
MAGNESIUM SERPL-MCNC: 2.2 MG/DL (ref 1.9–2.7)
MCH RBC QN AUTO: 31.3 PG (ref 26.8–34.3)
MCHC RBC AUTO-ENTMCNC: 32.9 G/DL (ref 31.4–37.4)
MCV RBC AUTO: 95 FL (ref 82–98)
PLATELET # BLD AUTO: 214 THOUSANDS/UL (ref 149–390)
PMV BLD AUTO: 9.6 FL (ref 8.9–12.7)
POTASSIUM SERPL-SCNC: 3.9 MMOL/L (ref 3.5–5.3)
RBC # BLD AUTO: 4.28 MILLION/UL (ref 3.88–5.62)
SODIUM SERPL-SCNC: 135 MMOL/L (ref 135–147)
WBC # BLD AUTO: 6.9 THOUSAND/UL (ref 4.31–10.16)

## 2024-09-30 PROCEDURE — 99239 HOSP IP/OBS DSCHRG MGMT >30: CPT

## 2024-09-30 PROCEDURE — 85027 COMPLETE CBC AUTOMATED: CPT

## 2024-09-30 PROCEDURE — 83735 ASSAY OF MAGNESIUM: CPT

## 2024-09-30 PROCEDURE — 85730 THROMBOPLASTIN TIME PARTIAL: CPT | Performed by: FAMILY MEDICINE

## 2024-09-30 PROCEDURE — 99233 SBSQ HOSP IP/OBS HIGH 50: CPT | Performed by: INTERNAL MEDICINE

## 2024-09-30 PROCEDURE — 80048 BASIC METABOLIC PNL TOTAL CA: CPT

## 2024-09-30 RX ORDER — CLOPIDOGREL BISULFATE 75 MG/1
75 TABLET ORAL DAILY
Qty: 30 TABLET | Refills: 0 | Status: SHIPPED | OUTPATIENT
Start: 2024-10-01

## 2024-09-30 RX ORDER — DOXYCYCLINE 100 MG/1
100 CAPSULE ORAL EVERY 12 HOURS SCHEDULED
Qty: 12 CAPSULE | Refills: 0 | Status: SHIPPED | OUTPATIENT
Start: 2024-09-30 | End: 2024-10-06

## 2024-09-30 RX ADMIN — CEFEPIME HYDROCHLORIDE 2000 MG: 2 INJECTION, SOLUTION INTRAVENOUS at 10:13

## 2024-09-30 RX ADMIN — CEFEPIME HYDROCHLORIDE 2000 MG: 2 INJECTION, SOLUTION INTRAVENOUS at 02:41

## 2024-09-30 RX ADMIN — TAMSULOSIN HYDROCHLORIDE 0.4 MG: 0.4 CAPSULE ORAL at 08:16

## 2024-09-30 RX ADMIN — TRIAMCINOLONE ACETONIDE: 1 CREAM TOPICAL at 08:18

## 2024-09-30 RX ADMIN — HEPARIN SODIUM 14 UNITS/KG/HR: 10000 INJECTION, SOLUTION INTRAVENOUS at 10:13

## 2024-09-30 RX ADMIN — EZETIMIBE 10 MG: 10 TABLET ORAL at 08:16

## 2024-09-30 RX ADMIN — NICOTINE 1 PATCH: 14 PATCH, EXTENDED RELEASE TRANSDERMAL at 08:17

## 2024-09-30 RX ADMIN — CLOPIDOGREL 75 MG: 75 TABLET ORAL at 08:16

## 2024-09-30 RX ADMIN — Medication 200 MG: at 08:16

## 2024-09-30 NOTE — PLAN OF CARE
Problem: PAIN - ADULT  Goal: Verbalizes/displays adequate comfort level or baseline comfort level  Description: Interventions:  - Encourage patient to monitor pain and request assistance  - Assess pain using appropriate pain scale  - Administer analgesics based on type and severity of pain and evaluate response  - Implement non-pharmacological measures as appropriate and evaluate response  - Consider cultural and social influences on pain and pain management  - Notify physician/advanced practitioner if interventions unsuccessful or patient reports new pain  Outcome: Progressing     Problem: INFECTION - ADULT  Goal: Absence or prevention of progression during hospitalization  Description: INTERVENTIONS:  - Assess and monitor for signs and symptoms of infection increased swelling redness pain RLE  - Monitor lab/diagnostic results  - Monitor all insertion sites, i.e. indwelling lines, tubes, and drains  - Monitor endotracheal if appropriate and nasal secretions for changes in amount and color  - [place] appropriate cooling/warming therapies per order  - Administer medications as ordered  - Instruct and encourage patient and family to use good hand hygiene technique  - Identify and instruct in appropriate isolation precautions for identified infection/condition  Outcome: Progressing     Problem: SAFETY ADULT  Goal: Patient will remain free of falls  Description: INTERVENTIONS:  - Educate patient/family on patient safety including physical limitations  - Instruct patient to call for assistance with activity   - Consult OT/PT to assist with strengthening/mobility   - Keep Call bell within reach  - Keep bed low and locked with side rails adjusted as appropriate  - Keep care items and personal belongings within reach  - Initiate and maintain comfort rounds  - Make Fall Risk Sign visible to staff  - Offer Toileting every 2 Hours, in advance of need  - Initiate/Maintain bed alarm  - Apply yellow socks and bracelet for  high fall risk patients  - Consider moving patient to room near nurses station  Outcome: Progressing

## 2024-09-30 NOTE — PROGRESS NOTES
Assessment/Plan:      There are no diagnoses linked to this encounter.      Subjective:     Patient ID: Jose Elias Mcgrath is a 69 y.o. male.    Pt is here to review results of CT scan. Pt is experiencing swelling and itching in right foot.    Pt states that he is unable to get his medications due to distance, so he has not taken any recent medication. Pt has a rash in left hand and right foot.    HPI    Review of Systems   Cardiovascular:  Positive for leg swelling.   Skin:  Positive for rash. Negative for color change.         Objective:     Physical Exam

## 2024-09-30 NOTE — ASSESSMENT & PLAN NOTE
History of angioplasty with coronary stent, follows with cardiology outpatient currently on aspirin 81 mg daily and brillinta 60 mg bid, zetia and crestor, will continue at this time  Stop brillinta and aspirin, switch to plavix per cardiology and vascular recs   Continue plavix on discharge.   Continue outpatient cardiology follow up

## 2024-09-30 NOTE — ASSESSMENT & PLAN NOTE
New onset for one year, affecting both hands and feet, confirmed on biopsy in June. Symptoms have failed to improve with topical steroids and has been treated with keflex for positive skin cx in the past. Recently started on methotrexate on 9/11 however patient has refused to take.    Appreciate dermatology evaluation, will need fu as outpatient for improved eczema control as well as consideration for repeat biopsy for routine and fungal c/s, previous plan to start methotrexate, consider allergy/immunology evaluation as outpatient   Recommend HIV screen, patient is declining at present  Needs close outpatient follow up with dermatology and improved management of eczema.

## 2024-09-30 NOTE — ASSESSMENT & PLAN NOTE
In the setting of severe eczema of foot, onychomycosis and PAD. Superficial wound cx with polymicrobial growth (Myroides, Enterobacter, Stenotrophomonas, Acinetobacter, Corynebacterium) which may represent skin colonization. CT is without abscess. Had been taking keflex prior to presentation but progression of symptoms may be secondary to edema v/s resistant organism. Patient is systemically well, afebrile without leukocytosis, clinically improving but has persistent foot edema     -Continue cefepime 2 q8h   -Patient can be transitioned to po bactrim 1 ds bid on discharge to complete 10 days of therapy through 10/5 (would favor holding ARB while on medication to prevent risk of hyperkalemia) . Alternatively would use doxy 100 bid.   -Continue supportive care, monitor clinical course, continue prn diuretics and compression wrap if cleared by vascular surgery   -Recommend close fu with dermatology to address underlying eczema which is a risk factor for recurrent infection   -Serial extremity exams  -Check daily CBC, CMP to monitor for any evolving antibiotic toxicity or treatment failure

## 2024-09-30 NOTE — ASSESSMENT & PLAN NOTE
New onset for one year, affecting both hands and feet, confirmed on biopsy in June. Symptoms have failed to improve with topical steroids and has been treated with keflex for positive skin cx in the past. Recently started on methotrexate on 9/11 but patient has not yet taken. Is a risk factor for recurrent infection     -Appreciate dermatology evaluation, will need fu as outpatient for improved eczema control as well as consideration for repeat biopsy for routine and fungal c/s, previous plan to start methotrexate, consider allergy/immunology evaluation as outpatient and consider short term doxycycline if patient continues to have secondary infection   -Recommend HIV screen, patient is declining at present

## 2024-09-30 NOTE — CASE MANAGEMENT
Case Management Discharge Planning Note    Patient name Jose Elias Mcgrath  Location /-01 MRN [id number]  : 1955 Date 2024       Current Admission Date: 2024  Current Admission Diagnosis:Cellulitis of right lower extremity   Patient Active Problem List    Diagnosis Date Noted Date Diagnosed    Peripheral arterial disease (HCC) 2024     Chronic obstructive pulmonary disease (COPD) (HCC) 2024     Tobacco user 2024     Cellulitis of right lower extremity 2024     Dyshidrotic eczema 2024     Onychomycosis 2024     CAD (coronary artery disease) 03/10/2021     Status post primary angioplasty with coronary stent 03/10/2021     Benign prostatic hyperplasia with urinary obstruction 01/10/2014     Hyperlipidemia 01/10/2014       LOS (days): 6  Geometric Mean LOS (GMLOS) (days): 3.2  Days to GMLOS:-2.7     OBJECTIVE:  Risk of Unplanned Readmission Score: 9.12         Current admission status: Inpatient   Preferred Pharmacy:   [id number] [address] [address]  Phone: [phone] Fax: [phone]    Primary Care Provider: Ila Braswell MD    Primary Insurance: MEDICARE  Secondary Insurance:  FOR LIFE    DISCHARGE DETAILS:           CM notified that pt will need transportation to and from his Vascular appt in [place], tomorrow, 10/1/24 at 1330.  CM placing transportation request to [facility] in [facility].    CM requesting a  time of 1130 at pts home in [place] on 10/1/24.  CM discussing with pt, that he MUST answer his phone tomorrow, as the [employer]  will be contacting him when he is close to his home, pt verbalizes understanding.

## 2024-09-30 NOTE — ASSESSMENT & PLAN NOTE
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  Evidence of high-grade stenosis versus occlusion and posterior tibial arteries  Vascular consulted - Start heparin drip and plavix. Urgent outpatient follow up on discharge, has appointment in [place] 10/1  D/w cardiology, stop brillinta, can use either aspirin OR plavix while on heparin. Vascular requesting plavix  On discharge should continue with Plavix and oral anticoagulation like Eliquis  Patient agreeable to cost on discharge.   Vascular appointment scheduled for 10/1, discussed with CM to assist with transportation for patient.   CT abdomen with runoff -right lower extremity: Mild aneurysmal dilatation of the popliteal artery with eccentric mural thrombus.  Inline flow was provided by single-vessel.  Left lower extremity: Aneurysmal dilatation of the left popliteal artery.  Abrupt occlusion of the proximal peroneal artery.  This reconstitutes to provide single-vessel inline flow to foot

## 2024-09-30 NOTE — DISCHARGE SUMMARY
Discharge Summary - Hospitalist   Name: Jose Elias Mcgrath 69 y.o. male I MRN: [id number]  Unit/Bed#: -01 I Date of Admission: 9/24/2024   Date of Service: 9/30/2024 I Hospital Day: 6     Assessment & Plan  Cellulitis of right lower extremity  Presented to ED with right foot swelling and drainage. States that he has been following with dermatology x 1 year and has been on diflucan previously for what he thought was fungal infection. Recently given keflex and diagnosed with eczema he said. States that he has been having worsening swelling, pain, and redness.   Outpatient therapy trialed with keflex and recently started on methotrexate for his eczema however he has not been taking the methotrexate.   Only started taking keflex around 4 days ago he said, but was prescribed on 9/11.   CT RLE: Extensive subcutaneous edema throughout the foot and ankle which could be cellulitis, without abscess formation. No evidence of osteomyelitis.  Procal normal  Cefepime  Blood cx NG 5 days  Wound cx: myroides odoratus, enterobacter cloacae, stenotrophomonas maltophilia, presumptive acinetobacter courvalinii species, corynebacterium amycolatum  Given 1 dose lasix due to swelling. Continue prn  venous duplex negative for DVT  Compression wrapping, elevation  Derm consulted - continue antibiotics and monitor cx. Once controlled, underlying eczema or tinea must be better managed. Consider topical gentamicin. Should follow up outpatient   ID consult -switch to cefepime and monitor cultures and MRSA. Recommend HIV screening and derm eval  Patient refusing HIV screening  Discharge on doxycycline 100 mg bid through 10/5, continue with leg elevation and compression on discharge.   Follow up with vascular surgery and dermatology on discharge.   Peripheral arterial disease (HCC)  Pedal pulses felt, no claudication and does not follow with vascular outpatient   Arterial duplex with bilateral popliteal artery is aneurysmal with mural  thrombus noted.  Evidence of high-grade stenosis versus occlusion and posterior tibial arteries  Vascular consulted - Start heparin drip and plavix. Urgent outpatient follow up on discharge, has appointment in [place] 10/1  D/w cardiology, stop brillinta, can use either aspirin OR plavix while on heparin. Vascular requesting plavix  On discharge should continue with Plavix and oral anticoagulation like Eliquis  Patient agreeable to cost on discharge.   Vascular appointment scheduled for 10/1, discussed with CM to assist with transportation for patient.   CT abdomen with runoff -right lower extremity: Mild aneurysmal dilatation of the popliteal artery with eccentric mural thrombus.  Inline flow was provided by single-vessel.  Left lower extremity: Aneurysmal dilatation of the left popliteal artery.  Abrupt occlusion of the proximal peroneal artery.  This reconstitutes to provide single-vessel inline flow to foot  Chronic obstructive pulmonary disease (COPD) (HCC)  History of COPD, not currently in acute exacerbation, not on any maintenance medications for this  Benign prostatic hyperplasia with urinary obstruction  Continue flomax  Urinary retention protocol  Hyperlipidemia  Lipitor intolerance, currently on crestor every other day and zetia 10 mg daily - continue  Status post primary angioplasty with coronary stent  History of angioplasty with coronary stent, follows with cardiology outpatient currently on aspirin 81 mg daily and brillinta 60 mg bid, zetia and crestor, will continue at this time  Stop brillinta and aspirin, switch to plavix per cardiology and vascular recs   Continue plavix on discharge.   Continue outpatient cardiology follow up   Tobacco user  Encourage smoking cessation, nicotine patch ordered  Dyshidrotic eczema  New onset for one year, affecting both hands and feet, confirmed on biopsy in June. Symptoms have failed to improve with topical steroids and has been treated with keflex for positive  skin cx in the past. Recently started on methotrexate on 9/11 however patient has refused to take.    Appreciate dermatology evaluation, will need fu as outpatient for improved eczema control as well as consideration for repeat biopsy for routine and fungal c/s, previous plan to start methotrexate, consider allergy/immunology evaluation as outpatient   Recommend HIV screen, patient is declining at present  Needs close outpatient follow up with dermatology and improved management of eczema.   Onychomycosis       Medical Problems       Resolved Problems  Date Reviewed: 9/30/2024   None       Discharging Physician / Practitioner: Ashley Mercado PA-C  PCP: Ila Braswell MD  Admission Date:   Admission Orders (From admission, onward)       Ordered        09/24/24 1616  INPATIENT ADMISSION  Once                          Discharge Date: 09/30/24    Consultations During Hospital Stay:  ID, vascular surgery, dermatology    Procedures Performed:   none    Significant Findings / Test Results:   CTA abdominal w run off w wo contrast   Final Result by Jero Mack MD (09/26 7839)      Vascular:   Mild aneurysmal dilatation of the infrarenal abdominal aorta, measuring 38 mm.      Right lower extremity:   1.  Mild aneurysmal dilatation of the popliteal artery with eccentric mural thrombus, measuring 15 mm.   2.  Inline flow is provided by the single vessel peroneal artery.      Left lower extremity:   1.  Aneurysmal dilatation of the left popliteal artery, measuring 25 mm.   2.  Abrupt occlusion of the proximal peroneal artery. This reconstitutes to provide single vessel inline flow to the foot.      Nonvascular:   Significant bladder distention with prostatic enlargement.      Workstation performed: [id number]         VAS ARTERIAL DUPLEX- LOWER LIMB BILATERAL   Final Result by De House DO (09/25 1635)       VAS VENOUS DUPLEX - LOWER LIMB BILATERAL   Final Result by Elias Riggins MD (09/25 2048)      CT  lower extremity w contrast right   Final Result by Roge Silveira MD (09/24 1607)      Extensive subcutaneous edema throughout the foot and ankle which could be cellulitis, without abscess formation. No evidence of osteomyelitis.         Workstation performed: [id number]           Wound culture: myroides odoratus, enterobacter cloacae, stenotrophomonas maltophilia, presumptive acinetobacter courvalinii, corynebacterium amycolatum  MRSA negative  BC NG  A1c: 5.9  CK normal  Procal negative    Incidental Findings:   none     Test Results Pending at Discharge (will require follow up):   none     Outpatient Tests Requested:  Follow up with PCP in 1 week  Follow up with vascular surgery tomorrow  Follow up with cardiology outpatient  Follow up with dermatology  Repeat CBC and BMP in 1 week    Complications:  none    Reason for Admission: cellulitis    Hospital Course:   Jose Elias Mcgrath is a 69 y.o. male patient who originally presented to the hospital on 9/24/2024 due to redness and swelling of the right foot for a week or so. In the ED, found to have cellulitis. Started on IV ancef and vancomycin. CT scan with results above. ID consulted due to extent of cellulitis and recommended to continue with IV ancef. ID subsequently recommended switching to cefepime.  Venous and arterial duplex ordered and due to results of arterial duplex, vascular surgery consulted. Recommended CTA abdomen runoff and start on heparin gtt and plavix with anticipation to discharge on eliquis and plavix. Needs urgent outpatient vascular follow up for further intervention. Eliquis sent to pharmacy and was acceptable cost and patient agreeable to this for discharge. Dermatology also consulted due to history of eczema and cellulitis. Dermatology recommending cefepime as well, follow up with cultures, further management of eczema and possible tinea tbd after infection more controlled, but to be done outpatient after discharge. Wound culture  "finalized with results above. Stable for discharge as cellulitis significantly improved. Discharged home with doxy 100 mg bid through 10/5. Needs to follow up with PCP in 1 week, repeat CBC and BMP in 1 week. Follow up with vascular surgery tomorrow for appointment - case management assisting with transportation for patient. Needs to follow up with cardiology outpatient as previously recommended. Follow up with dermatology for management of eczema, discussed with patient that he needs to be compliant with medications and recommendations. Should return for new or worsening symptoms. Verbalized understanding and agreement to plans above.      Please see above list of diagnoses and related plan for additional information.     Condition at Discharge: fair    Discharge Day Visit / Exam:   Subjective:  Seen and examined today. Said he is feeling fine, but a little upset that his hands are still \"flaking\", explained that there was no infection noted on his hands and this appeared more like eczema that he needed to follow up with dermatology for. He is a little frustrated still that he was not given antifungals, but is glad his foot looks better. He is going to follow up with vascular surgery tomorrow. No further complaints.   Vitals: Blood Pressure: 107/65 (09/30/24 0818)  Pulse: 56 (09/30/24 0818)  Temperature: (!) 96.8 °F (36 °C) (09/30/24 0653)  Temp Source: Temporal (09/30/24 0653)  Respirations: 18 (09/30/24 0653)  Height: 5' 9\" (175.3 cm) (09/25/24 0245)  Weight - Scale: 81.6 kg (179 lb 14.3 oz) (09/25/24 0245)  SpO2: 97 % (09/30/24 0818)  Exam:   Physical Exam  Vitals reviewed.   Constitutional:       General: He is not in acute distress.     Appearance: He is ill-appearing. He is not toxic-appearing.   HENT:      Head: Normocephalic and atraumatic.      Mouth/Throat:      Mouth: Mucous membranes are moist.   Cardiovascular:      Rate and Rhythm: Normal rate and regular rhythm.      Heart sounds: No murmur " heard.  Pulmonary:      Effort: No respiratory distress.      Breath sounds: No stridor. No wheezing.   Abdominal:      General: Bowel sounds are normal. There is no distension.      Palpations: Abdomen is soft. There is no mass.      Tenderness: There is no abdominal tenderness.   Musculoskeletal:      Right lower leg: Edema (significantly improve) present.      Left lower leg: No edema.   Skin:     General: Skin is warm and dry.      Findings: Erythema (right foot significantly improved; Erythema and scaling skin to bilateral feet, R>L) present.      Comments: Scaling to bilateral palms as well    Neurological:      Mental Status: He is alert and oriented to person, place, and time.   Psychiatric:         Mood and Affect: Mood normal.         Behavior: Behavior normal.          Discussion with Family: Patient declined call to .     Discharge instructions/Information to patient and family:   See after visit summary for information provided to patient and family.      Provisions for Follow-Up Care:  See after visit summary for information related to follow-up care and any pertinent home health orders.      Mobility at time of Discharge:   Basic Mobility Inpatient Raw Score: 20  JH-HLM Goal: 6: Walk 10 steps or more  JH-HLM Achieved: 6: Walk 10 steps or more  HLM Goal achieved. Continue to encourage appropriate mobility.     Disposition:   Home    Planned Readmission: no    Discharge Medications:  See after visit summary for reconciled discharge medications provided to patient and/or family.      Administrative Statements   Discharge Statement:  I have spent a total time of 57 minutes in caring for this patient on the day of the visit/encounter. >30 minutes of time was spent on: Diagnostic results, Risks and benefits of tx options, Patient and family education, Importance of tx compliance, Counseling / Coordination of care, Documenting in the medical record, Reviewing / ordering tests, medicine,  procedures  , and Communicating with other healthcare professionals .    **Please Note: This note may have been constructed using a voice recognition system**

## 2024-09-30 NOTE — PROGRESS NOTES
Progress Note - Infectious Disease   Name: Jose Elias Mcgrath 69 y.o. male I MRN: [id number]  Unit/Bed#: -01 I Date of Admission: 9/24/2024   Date of Service: 9/30/2024 I Hospital Day: 6        VIRTUAL CARE DOCUMENTATION:     1. This service was provided via Telemedicine using S[employer] Kit     2. Parties in the room with patient during teleconsult Patient only    3. Confidentiality My office door was closed     4. Participants No one else was in the room    5. Patient acknowledged consent and understanding of privacy and security of the  Telemedicine consult. I informed the patient that I have reviewed their record in Epic and presented the opportunity for them to ask any questions regarding the visit today.  The patient agreed to participate.    6. Time spent 10 minutes     Assessment & Plan  Cellulitis of right lower extremity  In the setting of severe eczema of foot, onychomycosis and PAD. Superficial wound cx with polymicrobial growth (Myroides, Enterobacter, Stenotrophomonas, Acinetobacter, Corynebacterium) which may represent skin colonization. CT is without abscess. Had been taking keflex prior to presentation but progression of symptoms may be secondary to edema v/s resistant organism. Patient is systemically well, afebrile without leukocytosis, clinically improving but has persistent foot edema     -Continue cefepime 2 q8h   -Patient can be transitioned to po bactrim 1 ds bid on discharge to complete 10 days of therapy through 10/5 (would favor holding ARB while on medication to prevent risk of hyperkalemia) . Alternatively would use doxy 100 bid.   -Continue supportive care, monitor clinical course, continue prn diuretics and compression wrap if cleared by vascular surgery   -Recommend close fu with dermatology to address underlying eczema which is a risk factor for recurrent infection   -Serial extremity exams  -Check daily CBC, CMP to monitor for any evolving antibiotic toxicity or treatment  failure    Dyshidrotic eczema  New onset for one year, affecting both hands and feet, confirmed on biopsy in . Symptoms have failed to improve with topical steroids and has been treated with keflex for positive skin cx in the past. Recently started on methotrexate on  but patient has not yet taken. Is a risk factor for recurrent infection     -Appreciate dermatology evaluation, will need fu as outpatient for improved eczema control as well as consideration for repeat biopsy for routine and fungal c/s, previous plan to start methotrexate, consider allergy/immunology evaluation as outpatient and consider short term doxycycline if patient continues to have secondary infection   -Recommend HIV screen, patient is declining at present  Onychomycosis  Risk factor for infection, has completed course of terbinafine and pulsed fluconazole     -FU per dermatology and nail trimming  Tobacco user     -Recommend tobacco cessation counseling  Peripheral arterial disease (HCC)  Has bilateral popliteal aneurysms and associated mural thrombus with preserved runoff.     -Mngt per vascular surgery, on antithrombotics      Reviewed recommendations to change to  po abx on dc with primary team who is in agreement. Will sign off.  Please call with concerns or any changes in clinical status or new test results.      Subjective:  The patient has no complaints. Continues to note improvement in right foot swelling and redness  Denies fevers, chills, or sweats.  Denies nausea, vomiting, or diarrhea.    Antibiotics:  Cefepime    Physical Exam     Temp:  [96.8 °F (36 °C)-97 °F (36.1 °C)] 96.8 °F (36 °C)  HR:  [47-59] 56  Resp:  [18] 18  BP: (100-121)/(57-71) 107/65  SpO2:  [96 %-100 %] 97 %  Temp (24hrs), Av.9 °F (36.1 °C), Min:96.8 °F (36 °C), Max:97 °F (36.1 °C)  Current: Temperature: (!) 96.8 °F (36 °C)    Intake/Output Summary (Last 24 hours) at 2024 1200  Last data filed at 2024 0848  Gross per 24 hour   Intake 1059.6  ml   Output --   Net 1059.6 ml         Physical exam findings reported by bedside and primary medical team staff      General Appearance:  Appearing well, nontoxic, and in no distress, appears stated age   Lungs:   Clear to auscultation bilaterally, no audible wheezes, rhonchi and rales, respirations unlabored   Heart:  Regular rate and rhythm, S1, S2 normal, no murmur, rub or gallop   Abdomen:   Soft, non-tender, non-distended, positive bowel sounds, no masses, no organomegaly    No CVA tenderness   Extremities: Extremities normal, atraumatic, no cyanosis, clubbing , R foot edema 1+ , improved   Skin: Minimal dorsal right foot erythema with scaly desquamation. Discrete eimprovemerythematous scaly patches over bilateral hands and L foot   Neurologic: Alert and oriented times 3,         Invasive Devices:   Peripheral IV 09/24/24 Right Antecubital (Active)   Site Assessment WDL 09/25/24 1000   Dressing Type Transparent 09/25/24 1000   Line Status Flushed 09/25/24 1000   Dressing Status Clean;Dry;Intact 09/25/24 1000   Dressing Change Due 09/28/24 09/24/24 2300   Reason Not Rotated Not due 09/25/24 1000       Labs, Imaging, & Other Studies     Lab Results:    I have personally reviewed pertinent labs.    Results from last 7 days   Lab Units 09/30/24  0603 09/29/24  0445 09/28/24  0422   WBC Thousand/uL 6.90 6.90 7.03   HEMOGLOBIN g/dL 13.4 13.5 13.3   PLATELETS Thousands/uL 214 208 215     Results from last 7 days   Lab Units 09/30/24  0603 09/26/24  0455 09/25/24  0450   POTASSIUM mmol/L 3.9   < > 3.6   CHLORIDE mmol/L 105   < > 104   CO2 mmol/L 24   < > 25   BUN mg/dL 15   < > 13   CREATININE mg/dL 0.86   < > 0.95   EGFR ml/min/1.73sq m 87   < > 80   CALCIUM mg/dL 8.8   < > 9.1   AST U/L  --   --  15   ALT U/L  --   --  13   ALK PHOS U/L  --   --  52    < > = values in this interval not displayed.     Results from last 7 days   Lab Units 09/24/24 2118 09/24/24 2039 09/24/24  1555 09/24/24  1515   BLOOD CULTURE    --   --  No Growth After 5 Days. No Growth After 5 Days.   GRAM STAIN RESULT  No polys seen*  1+ Gram negative rods*  --   --   --    WOUND CULTURE  4+ Growth of Myroides odoratus*  1+ Growth of Enterobacter cloacae*  2+ Growth of Stenotrophomonas maltophilia*  2+ Growth of - Presumptive Acinetobacter courvalinii Acinetobacter species*  4+ Growth of Corynebacterium amycolatum*  --   --   --    MRSA CULTURE ONLY   --  No Methicillin Resistant Staphlyococcus aureus (MRSA) isolated  --   --        Imaging Studies:   I have personally reviewed pertinent imaging study reports and images in PACS.      EKG, Pathology, and Other Studies:   I have personally reviewed pertinent reports.        Counseling/Coordination of care:       Total 50 minutes in evaluation of the patient and communication with the patient via telehealth of which 30 minutes was in counseling/coordination of care.  Extensive review of the medical records in epic including review of the notes, radiographs, and laboratory results.  My recommendations were discussed with the patient in detail who verbalized understanding.

## 2024-09-30 NOTE — ASSESSMENT & PLAN NOTE
Has bilateral popliteal aneurysms and associated mural thrombus with preserved runoff.     -Mngt per vascular surgery, on antithrombotics

## 2024-09-30 NOTE — ASSESSMENT & PLAN NOTE
Presented to ED with right foot swelling and drainage. States that he has been following with dermatology x 1 year and has been on diflucan previously for what he thought was fungal infection. Recently given keflex and diagnosed with eczema he said. States that he has been having worsening swelling, pain, and redness.   Outpatient therapy trialed with keflex and recently started on methotrexate for his eczema however he has not been taking the methotrexate.   Only started taking keflex around 4 days ago he said, but was prescribed on 9/11.   CT RLE: Extensive subcutaneous edema throughout the foot and ankle which could be cellulitis, without abscess formation. No evidence of osteomyelitis.  Procal normal  Cefepime  Blood cx NG 5 days  Wound cx: myroides odoratus, enterobacter cloacae, stenotrophomonas maltophilia, presumptive acinetobacter courvalinii species, corynebacterium amycolatum  Given 1 dose lasix due to swelling. Continue prn  venous duplex negative for DVT  Compression wrapping, elevation  Derm consulted - continue antibiotics and monitor cx. Once controlled, underlying eczema or tinea must be better managed. Consider topical gentamicin. Should follow up outpatient   ID consult -switch to cefepime and monitor cultures and MRSA. Recommend HIV screening and derm eval  Patient refusing HIV screening  Discharge on doxycycline 100 mg bid through 10/5, continue with leg elevation and compression on discharge.   Follow up with vascular surgery and dermatology on discharge.

## 2024-09-30 NOTE — DISCHARGE INSTR - AVS FIRST PAGE
Dear Jose Elias Mcgrath,     It was our pleasure to care for you here at [facility]. For follow up as well as any medication refills, we recommend that you follow up with your primary care physician. Here are the most important instructions/ recommendations at discharge:     Notable Medication Adjustments -   Start taking doxycycline 100 mg twice daily through 10/5.   Start taking eliquis 5 mg twice daily  Start taking plavix 75 mg daily  Testing Required after Discharge -   Repeat CBC and BMP in 1 week  Important follow up information -   Follow up with PCP in 1 week  Follow up with dermatology outpatient   Follow up with vascular surgery tomorrow at 1:30  Other Instructions -   Continue taking medications as prescribed. If you have any new or worsening symptoms, please return. Can continue with compression wraps per ID recommendations, able to be done per vascular surgery recommendations.   Please review this entire after visit summary as additional general instructions including medication list, appointments, activity, diet, any pertinent wound care, and other additional recommendations from your care team that may be provided for you.      Sincerely,     Ashley Mercado PA-C

## 2024-10-01 ENCOUNTER — TELEPHONE (OUTPATIENT)
Dept: VASCULAR SURGERY | Facility: CLINIC | Age: 69
End: 2024-10-01

## 2024-10-01 ENCOUNTER — OFFICE VISIT (OUTPATIENT)
Dept: VASCULAR SURGERY | Facility: CLINIC | Age: 69
End: 2024-10-01
Payer: MEDICARE

## 2024-10-01 VITALS
HEIGHT: 69 IN | DIASTOLIC BLOOD PRESSURE: 58 MMHG | BODY MASS INDEX: 26.02 KG/M2 | HEART RATE: 76 BPM | WEIGHT: 175.7 LBS | SYSTOLIC BLOOD PRESSURE: 118 MMHG | OXYGEN SATURATION: 97 %

## 2024-10-01 DIAGNOSIS — M79.605 LOWER EXTREMITY PAIN, LATERAL, LEFT: ICD-10-CM

## 2024-10-01 DIAGNOSIS — I72.4 POPLITEAL ARTERY ANEURYSM (HCC): Primary | ICD-10-CM

## 2024-10-01 PROCEDURE — 99214 OFFICE O/P EST MOD 30 MIN: CPT | Performed by: STUDENT IN AN ORGANIZED HEALTH CARE EDUCATION/TRAINING PROGRAM

## 2024-10-01 RX ORDER — CHLORHEXIDINE GLUCONATE ORAL RINSE 1.2 MG/ML
15 SOLUTION DENTAL ONCE
OUTPATIENT
Start: 2024-10-01 | End: 2024-10-01

## 2024-10-01 NOTE — CASE MANAGEMENT
Case Management Discharge Planning Note    Patient name Jose Elias Mcgrath  Location /-01 MRN [id number]  : 1955 Date 10/1/2024       Current Admission Date: 2024  Current Admission Diagnosis:Cellulitis of right lower extremity   Patient Active Problem List    Diagnosis Date Noted Date Diagnosed    Peripheral arterial disease (HCC) 2024     Chronic obstructive pulmonary disease (COPD) (HCC) 2024     Tobacco user 2024     Cellulitis of right lower extremity 2024     Dyshidrotic eczema 2024     Onychomycosis 2024     CAD (coronary artery disease) 03/10/2021     Status post primary angioplasty with coronary stent 03/10/2021     Benign prostatic hyperplasia with urinary obstruction 01/10/2014     Hyperlipidemia 01/10/2014       LOS (days): 6  Geometric Mean LOS (GMLOS) (days): 3.2  Days to GMLOS:-2.9     OBJECTIVE:  Risk of Unplanned Readmission Score: 9.37         Current admission status: Inpatient   Preferred Pharmacy:   [id number] [address] [address]  Phone: [phone] Fax: [phone]    Primary Care Provider: Ila Braswell MD    Primary Insurance: MEDICARE  Secondary Insurance:  FOR LIFE    DISCHARGE DETAILS:        CM received message from Jose Elias that his address for  for his appointment today will change to [address] [place] [address]. CM updated transportation in Round trip and left patient a message of the location update.

## 2024-10-01 NOTE — TELEPHONE ENCOUNTER
REMINDER: Under Reason For Call, comments MUST be formatted as:   (Surgeon's Initials) / (Procedure)      Special Instructions / FYI: NEEDS VEIN MAPPING BEFORE SURGERY, PT PREFERS OW BUT THERE IS NOTHING UNTIL DECEMBER SO I ADVISED TIFFANY AND IN TOUCH WITH THE TECHNICIANS, WILL CALL BACK PT ONCE TECHS GET BACK TO ME, WILL GO TO [place] IF NOTHING IN OW    Consent: Consent will be signed day of procedure.    For Surgical Clearances     Levels   1-3   ROUTE this encounter to [facility] Clearance Pool (AND)   [facility] Surgery Coordinator Pool     Level   4   ROUTE this encounter to [facility] Surgery Coordinator Pool       HYDRATION CLEARANCES   ONLY ROUTE TO  [facility] Clearance Pool       Yes, I have ROUTED this encounter to [facility] Surgery Coordinator and/or [facility] Clearance Pool.

## 2024-10-01 NOTE — PROGRESS NOTES
Vascular Surgery New Patient Visit  Date: 10/01/24      Assessment:  Jose Elias Mcgrath is a 69 y.o. male with bilateral popliteal aneurysms.  The left is larger than the right.  They are both currently asymptomatic at this time.  With respect to the skin issues and swelling discussed below, the swelling is much improved today and his eczema/psoriasis extends up to about the level of the ankles.  Do not anticipate this will interfere with surgery.  Discussed the indication, rationale, and risks for surgical intervention of popliteal aneurysms.  I answered all of Mr. Mcgrath's questions to his satisfaction and he wished to proceed with surgery.  Given that the left aneurysm is larger, we will plan to proceed with that side first.      Plan:  -Posted for L pop aneurysm repair  -Obtain vein mapping prior to surgery  -Hold eliquis 2d before surgery    Diagnoses and all orders for this visit:    Popliteal artery aneurysm (HCC)  -     Case request operating room: BYPASS FEMORAL-POPLITEAL; Standing  -     Case request operating room: BYPASS FEMORAL-POPLITEAL  -     VAS VEIN MAPPING- LOWER EXTREMITY; Future    Lower extremity pain, lateral, left  -     VAS VEIN MAPPING- LOWER EXTREMITY; Future    Other orders  -     Diet NPO; Sips with meds; Standing  -     Void on call to OR; Standing  -     Insert peripheral IV; Standing  -     Nursing Communication CHG bath, have staff wash entire body (neck down) per pre op bathing protocol. Routine, evening prior to, and day of surgery.; Standing  -     Nursing Communication Swab both nares with Povidone-Iodine solution, EXCLUDE if patient has shellfish/Iodine allergy, and replace with nasal alcohol swabstick. Routine, day of surgery, on call to OR.; Standing  -     chlorhexidine (PERIDEX) 0.12 % oral rinse 15 mL  -     Place sequential compression device; Standing  -     ceFAZolin (ANCEF) 2,000 mg in sodium chloride 0.9 % 50 mL IVPB             Operative Scheduling  Information:    Hospital:  [facility] OR    Physician:  Me    Surgery: L pop aneurysm repair    Urgency:  Standard    Level:  Level 3: Outpatients to be scheduled for elective surgery than can be delayed up to 4 weeks without reasonable expectation of detriment to patient    Case Length:  Normal    Post-op Bed:  ICU    OR Table:  Standard, prone positioning     Equipment Needs:  None    Medication Instructions:  Plavix:  Continue (do not hold)  Eliquis:  Hold for 2 days prior to procedure    Hydration:  No    Contrast Allergy:  no        Subjective:     HPI:  Jose Elias Mcgrath is a 69 y.o. male with PMH of NSTEMI s/p PCI in 2021 (last echo 7/2023 EF 55%, no clinically significant AS or valvular regurgitation). On eliquis for BL pop aneurysms and plavix (transitioned from brillinta due to starting eliquis). He presents today to discuss BL pop aneurysms, which were incidentally identified during recent hospitalization (details outlined below).     9/26/2024 CTA demonstrated 3.6 cm infrarenal AAA in addition to left 2.6 cm popliteal aneurysm with mural thrombus and a right 1.5 cm popliteal aneurysm.  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  He does fortunately have preserved peroneal runoff bilaterally.  This appears to be a chronic process given the robust size of his peroneal arteries and he denies any pain/sensory/motor deficits.    Based on review of care everywhere notes he presented to an urgent care in 8/2023 with foot pain, had been treating himself for athlete's foot with antifungal cream.  He was given a prescription for 7d doxycycline and 2d fluconazole.  He had been treated with 3 months of terbinafine prior to this.  Since 1/2024, he has been following with dermatology and treated with antifungal creams and Bactrim/Keflex.  He was also diagnosed with eczema and started on methotrexate for this but has not been taking it.     He was admitted on 9/24/2024 for bilateral  "lower extremity swelling and cellulitis.  He was treated with IV antibiotics and blood cultures were no growth x5d.  BL venous duplex was negative for DVT.  Underlying factor promoting cellulitis infection was determined to be poorly controlled eczema.  Plan for this was obtaining better control of his eczema in outpatient setting following successful treatment of cellulitis.      Objective:    ROS:  All systems were reviewed and are negative except those mentioned in HPI and below.      Vitals: /58 (BP Location: Left arm, Patient Position: Sitting, Cuff Size: Large)   Pulse 76   Ht 5' 9\" (1.753 m)   Wt 79.7 kg (175 lb 11.2 oz)   SpO2 97%   BMI 25.95 kg/m²      General: male appears stated age, no apparent distress, alert and oriented   HEENT: normocephalic, atraumatic   Cardiovascular: hemodynamically stable   Chest/Lungs: no increased work of breathing, chest rise equal bilaterally   Abdomen: Soft, ND, NT, no pulsatile masses   Extremities: Essentially no swelling bilaterally.  Skin changes extend up to about the level of the ankles bilaterally   Skin: warm and dry   Neuro: no gross deficits      Medications:  Current Outpatient Medications   Medication Sig Dispense Refill    apixaban (Eliquis) 5 mg Take 1 tablet (5 mg total) by mouth 2 (two) times a day (Patient not taking: Reported on 10/1/2024) 60 tablet 0    Cholecalciferol 1.25 MG (64923 UT) capsule Take 50,000 Units by mouth      clopidogrel (PLAVIX) 75 mg tablet Take 1 tablet (75 mg total) by mouth daily (Patient not taking: Reported on 10/1/2024) 30 tablet 0    co-enzyme Q-10 100 mg capsule Take 200 mg by mouth 2 (two) times a day (Patient not taking: Reported on 10/1/2024)      doxycycline hyclate (VIBRAMYCIN) 100 mg capsule Take 1 capsule (100 mg total) by mouth every 12 (twelve) hours for 6 days (Patient not taking: Reported on 10/1/2024) 12 capsule 0    ezetimibe (ZETIA) 10 mg tablet Take 10 mg by mouth daily (Patient not taking: Reported on " 10/1/2024)      folic acid (FOLVITE) 1 mg tablet Take 1 mg by mouth daily Everyday except methotrexate days (Patient not taking: Reported on 10/1/2024)      losartan (COZAAR) 25 mg tablet  (Patient not taking: Reported on 10/1/2024)      methotrexate 2.5 MG tablet Take 2.5 mg by mouth every 12 hours for 3 doses only each week (Patient not taking: Reported on 10/1/2024)      metoprolol succinate (TOPROL-XL) 25 mg 24 hr tablet  (Patient not taking: Reported on 10/1/2024)      mometasone (ELOCON) 0.1 % lotion Apply 1 Application topically 2 (two) times a day as needed (.) (Patient not taking: Reported on 10/1/2024)      rosuvastatin (CRESTOR) 40 MG tablet  (Patient not taking: Reported on 10/1/2024)      tamsulosin (FLOMAX) 0.4 mg Take 0.4 mg by mouth every morning (Patient not taking: Reported on 10/1/2024)      varenicline (CHANTIX) 1 mg tablet Days 1 to 3: Oral: 0.5 mg once dailyDays 4 to 7: Oral: 0.5 mg twice daily  There after Oral: 1 mg twice daily for 11 weeks (Patient not taking: Reported on 8/3/2022)       No current facility-administered medications for this visit.       Allergies:  No Known Allergies     PMH:  Past Medical History:   Diagnosis Date    BPH (benign prostatic hyperplasia)     CAD (coronary artery disease)     Enlarged prostate     Hyperlipidemia     Vitamin D deficiency         PSH:  Past Surgical History:   Procedure Laterality Date    COLONOSCOPY      CORONARY ANGIOPLASTY Left 02/26/2021    with heart cath    INGUINAL HERNIA REPAIR  11/29/2017    TONSILECTOMY AND ADNOIDECTOMY          FHx:  Family History   Problem Relation Age of Onset    Heart attack Father     Prostate cancer Father         SHx:  Social History     Socioeconomic History    Marital status:      Spouse name: Not on file    Number of children: Not on file    Years of education: Not on file    Highest education level: Not on file   Occupational History    Not on file   Tobacco Use    Smoking status: Every Day      Current packs/day: 1.00     Types: Cigarettes    Smokeless tobacco: Never   Vaping Use    Vaping status: Never Used   Substance and Sexual Activity    Alcohol use: Not Currently    Drug use: Never    Sexual activity: Not on file   Other Topics Concern    Not on file   Social History Narrative    Not on file     Social Determinants of Health     Financial Resource Strain: Not on file   Food Insecurity: No Food Insecurity (9/27/2024)    Hunger Vital Sign     Worried About Running Out of Food in the Last Year: Never true     Ran Out of Food in the Last Year: Never true   Transportation Needs: No Transportation Needs (9/27/2024)    PRAPARE - Transportation     Lack of Transportation (Medical): No     Lack of Transportation (Non-Medical): No   Physical Activity: Not on file   Stress: Not on file   Social Connections: Unknown (6/18/2024)    Received from [place]    Social [employer]     How often do you feel lonely or isolated from those around you? (Adult - for ages 18 years and over): Not on file   Intimate Partner Violence: Not on file   Housing Stability: Low Risk  (9/27/2024)    Housing Stability Vital Sign     Unable to Pay for Housing in the Last Year: No     Number of Times Moved in the Last Year: 0     Homeless in the Last Year: No

## 2024-10-01 NOTE — TELEPHONE ENCOUNTER
Operative Scheduling Information:     Hospital:  [facility] [address] OR     Physician:  Me     Surgery: L pop aneurysm bypass/exclusion     Urgency:  Standard     Level:  Level 3: Outpatients to be scheduled for elective surgery than can be delayed up to 4 weeks without reasonable expectation of detriment to patient     Case Length:  Normal     Post-op Bed:  ICU     OR Table:  Standard     Equipment Needs:  None     Medication Instructions:  Plavix:  Continue (do not hold)  Eliquis:  Hold for 2 days prior to procedure     Hydration:  No     Contrast Allergy:  no

## 2024-10-02 NOTE — TELEPHONE ENCOUNTER
"From: Elias Riggins <[email or web address]>  Sent: Wednesday, October 2, 2024 7:41 AM  To: Vascular Surgery Schedulers <[email or web address]>  Subject: Re case request placed 10/1/24     Hello!    I put in a case request for this patient (Jose Elias Mcgrath MRN [id number]) yesterday and need to make a change. Still doing left side, but need to change the case to \"popliteal artery interposition\" with prone positioning.    Thanks!  Elias   "

## 2024-10-21 ENCOUNTER — HOSPITAL ENCOUNTER (OUTPATIENT)
Dept: NON INVASIVE DIAGNOSTICS | Facility: HOSPITAL | Age: 69
Discharge: HOME | End: 2024-10-21
Attending: STUDENT IN AN ORGANIZED HEALTH CARE EDUCATION/TRAINING PROGRAM
Payer: MEDICARE

## 2024-10-21 DIAGNOSIS — M79.605 LOWER EXTREMITY PAIN, LATERAL, LEFT: ICD-10-CM

## 2024-10-21 DIAGNOSIS — I72.4 POPLITEAL ARTERY ANEURYSM (HCC): ICD-10-CM

## 2024-10-21 PROCEDURE — 93971 EXTREMITY STUDY: CPT

## 2024-10-28 ENCOUNTER — PREP FOR PROCEDURE (OUTPATIENT)
Dept: VASCULAR SURGERY | Facility: CLINIC | Age: 69
End: 2024-10-28

## 2024-10-28 PROCEDURE — 93971 EXTREMITY STUDY: CPT | Performed by: SURGERY

## 2024-10-30 RX ORDER — ECONAZOLE NITRATE 10 MG/G
CREAM TOPICAL
COMMUNITY
Start: 2024-09-24

## 2024-10-30 RX ORDER — FUROSEMIDE 20 MG/1
20 TABLET ORAL DAILY
COMMUNITY
Start: 2024-10-17

## 2024-10-30 NOTE — PRE-PROCEDURE INSTRUCTIONS
Pre-Surgery Instructions:   Medication Instructions    apixaban (Eliquis) 5 mg Instructions provided by MD- per surgeon, hold 2 days prior to surgery    clopidogrel (PLAVIX) 75 mg tablet Instructions provided by MD- per surgeon, continue    econazole nitrate 1 % cream Stop taking 1 day prior to surgery.    ezetimibe (ZETIA) 10 mg tablet Take day of surgery.    furosemide (LASIX) 20 mg tablet Hold day of surgery.    losartan (COZAAR) 25 mg tablet Hold day of surgery.    metoprolol succinate (TOPROL-XL) 25 mg 24 hr tablet Take day of surgery.    tamsulosin (FLOMAX) 0.4 mg Take day of surgery.   Medication instructions for day surgery reviewed. Please use only a sip of water to take your instructed medications. Avoid all over the counter vitamins, supplements and NSAIDS for one week prior to surgery per anesthesia guidelines. Tylenol is ok to take as needed.     You will receive a call one business day prior to surgery with an arrival time and hospital directions. If your surgery is scheduled on a Monday, the hospital will be calling you on the Friday prior to your surgery. If you have not heard from anyone by 8pm, please call the hospital supervisor through the hospital  at [phone]. ([place] [phone] or [place] [phone]).    Do not eat or drink anything after midnight the night before your surgery, including candy, mints, lifesavers, or chewing gum. Do not drink alcohol 24hrs before your surgery. Try not to smoke at least 24hrs before your surgery.       Follow the pre surgery showering instructions as listed in the “My Surgical Experience Booklet” or otherwise provided by your surgeon's office. Do not use a blade to shave the surgical area 1 week before surgery. It is okay to use a clean electric clippers up to 24 hours before surgery. Do not apply any lotions, creams, including makeup, cologne, deodorant, or perfumes after showering on the day of your surgery. Do not use dry shampoo, hair  "spray, hair gel, or any type of hair products.     No contact lenses, eye make-up, or artificial eyelashes. Remove nail polish, including gel polish, and any artificial, gel, or acrylic nails if possible. Remove all jewelry including rings and body piercing jewelry.     Wear causal clothing that is easy to take on and off. Consider your type of surgery.    Keep any valuables, jewelry, piercings at home. Please bring any specially ordered equipment (sling, braces) if indicated.    Arrange for a responsible person to drive you to and from the hospital on the day of your surgery. Please confirm the visitor policy for the day of your procedure when you receive your phone call with an arrival time.     Call the surgeon's office with any new illnesses, exposures, or additional questions prior to surgery.    Please reference your “My Surgical Experience Booklet” for additional information to prepare for your upcoming surgery.     See Geriatric Assessment below...  Cognitive Assessment:   CAM:   TUG <15 sec:  Falls (last 6 months): 0  Hand  score:  -Attempt 1:  -Attempt 2:  -Attempt 3:  Chuy Total Score: 20  PHQ- 9 Depression Scale: 5  Nutrition Assessment Score: 13  METS:6.05  Incentive Spirometry Level:   Health goals:  -What are your overall health goals? (quit smoking, wt. loss, rest, decrease stress)  \"To improve my skin condition and figure out what is causing it\"  -What brings you strength? (family, friends, Yarsanism, health)  \"Rastafarian\"  -What activities are important to you? (exercise, reading, travel, work)  \"I used to like to go bowling, hiking, camping\"     "

## 2024-10-31 ENCOUNTER — ANESTHESIA EVENT (OUTPATIENT)
Dept: PERIOP | Facility: HOSPITAL | Age: 69
DRG: 254 | End: 2024-10-31
Payer: MEDICARE

## 2024-10-31 DIAGNOSIS — Z01.818 PREOP TESTING: Primary | ICD-10-CM

## 2024-11-01 DIAGNOSIS — I73.9 PERIPHERAL ARTERIAL DISEASE (HCC): ICD-10-CM

## 2024-11-01 RX ORDER — CLOPIDOGREL BISULFATE 75 MG/1
75 TABLET ORAL DAILY
Qty: 30 TABLET | Refills: 5 | Status: SHIPPED | OUTPATIENT
Start: 2024-11-01

## 2024-11-01 NOTE — TELEPHONE ENCOUNTER
Reason for call:   [x] Refill   [] Prior Auth  [] Other:     Office:   [x] PCP/Provider -   [] Specialty/Provider -     Medication: clopidogrel (PLAVIX) 75 mg tablet Take 1 tablet (75 mg total) by mouth daily     Pharmacy: [id number] - [place] [address]      Does the patient have enough for 3 days?   [x] Yes   [] No - Send as HP to POD

## 2024-11-08 ENCOUNTER — LAB REQUISITION (OUTPATIENT)
Dept: LAB | Facility: HOSPITAL | Age: 69
DRG: 254 | End: 2024-11-08
Payer: MEDICARE

## 2024-11-08 ENCOUNTER — OFFICE VISIT (OUTPATIENT)
Dept: LAB | Facility: HOSPITAL | Age: 69
End: 2024-11-08
Payer: MEDICARE

## 2024-11-08 ENCOUNTER — APPOINTMENT (OUTPATIENT)
Dept: LAB | Facility: HOSPITAL | Age: 69
End: 2024-11-08
Payer: MEDICARE

## 2024-11-08 DIAGNOSIS — Z01.818 ENCOUNTER FOR OTHER PREPROCEDURAL EXAMINATION: ICD-10-CM

## 2024-11-08 DIAGNOSIS — Z01.818 PREOP TESTING: ICD-10-CM

## 2024-11-08 LAB
ABO GROUP BLD: NORMAL
ATRIAL RATE: 63 BPM
BLD GP AB SCN SERPL QL: NEGATIVE
P AXIS: 71 DEGREES
PR INTERVAL: 176 MS
QRS AXIS: -31 DEGREES
QRSD INTERVAL: 82 MS
QT INTERVAL: 426 MS
QTC INTERVAL: 435 MS
RH BLD: POSITIVE
SPECIMEN EXPIRATION DATE: NORMAL
T WAVE AXIS: 1 DEGREES
VENTRICULAR RATE: 63 BPM

## 2024-11-08 PROCEDURE — 86850 RBC ANTIBODY SCREEN: CPT | Performed by: ANESTHESIOLOGY

## 2024-11-08 PROCEDURE — 36415 COLL VENOUS BLD VENIPUNCTURE: CPT

## 2024-11-08 PROCEDURE — 93005 ELECTROCARDIOGRAM TRACING: CPT

## 2024-11-08 PROCEDURE — 86901 BLOOD TYPING SEROLOGIC RH(D): CPT | Performed by: ANESTHESIOLOGY

## 2024-11-08 PROCEDURE — 93010 ELECTROCARDIOGRAM REPORT: CPT | Performed by: INTERNAL MEDICINE

## 2024-11-08 PROCEDURE — 86900 BLOOD TYPING SEROLOGIC ABO: CPT | Performed by: ANESTHESIOLOGY

## 2024-11-11 ENCOUNTER — HOSPITAL ENCOUNTER (INPATIENT)
Facility: HOSPITAL | Age: 69
LOS: 3 days | Discharge: NURSING, REHAB OR LONG-TERM CARE | DRG: 254 | End: 2024-11-14
Attending: STUDENT IN AN ORGANIZED HEALTH CARE EDUCATION/TRAINING PROGRAM | Admitting: STUDENT IN AN ORGANIZED HEALTH CARE EDUCATION/TRAINING PROGRAM
Payer: MEDICARE

## 2024-11-11 ENCOUNTER — ANESTHESIA (OUTPATIENT)
Dept: PERIOP | Facility: HOSPITAL | Age: 69
DRG: 254 | End: 2024-11-11
Payer: MEDICARE

## 2024-11-11 DIAGNOSIS — R33.8 POSTOPERATIVE URINARY RETENTION: ICD-10-CM

## 2024-11-11 DIAGNOSIS — N99.89 POSTOPERATIVE URINARY RETENTION: ICD-10-CM

## 2024-11-11 DIAGNOSIS — I72.4 POPLITEAL ANEURYSM (HCC): Primary | ICD-10-CM

## 2024-11-11 LAB
ABO GROUP BLD: NORMAL
ABO GROUP BLD: NORMAL
ATRIAL RATE: 72 BPM
BLD GP AB SCN SERPL QL: NEGATIVE
GLUCOSE SERPL-MCNC: 149 MG/DL (ref 65–140)
KCT BLD-ACNC: 235 SEC (ref 89–137)
KCT BLD-ACNC: 255 SEC (ref 89–137)
KCT BLD-ACNC: 268 SEC (ref 89–137)
P AXIS: 68 DEGREES
PLATELET # BLD AUTO: 173 THOUSANDS/UL (ref 149–390)
PMV BLD AUTO: 9.6 FL (ref 8.9–12.7)
PR INTERVAL: 164 MS
QRS AXIS: -29 DEGREES
QRSD INTERVAL: 84 MS
QT INTERVAL: 404 MS
QTC INTERVAL: 443 MS
RH BLD: POSITIVE
RH BLD: POSITIVE
SPECIMEN EXPIRATION DATE: NORMAL
SPECIMEN SOURCE: ABNORMAL
T WAVE AXIS: 11 DEGREES
VENTRICULAR RATE: 72 BPM

## 2024-11-11 PROCEDURE — 93005 ELECTROCARDIOGRAM TRACING: CPT

## 2024-11-11 PROCEDURE — 86901 BLOOD TYPING SEROLOGIC RH(D): CPT | Performed by: ANESTHESIOLOGY

## 2024-11-11 PROCEDURE — 86850 RBC ANTIBODY SCREEN: CPT | Performed by: ANESTHESIOLOGY

## 2024-11-11 PROCEDURE — 041L0JM BYPASS LEFT FEMORAL ARTERY TO PERONEAL ARTERY WITH SYNTHETIC SUBSTITUTE, OPEN APPROACH: ICD-10-PCS | Performed by: STUDENT IN AN ORGANIZED HEALTH CARE EDUCATION/TRAINING PROGRAM

## 2024-11-11 PROCEDURE — C1768 GRAFT, VASCULAR: HCPCS | Performed by: STUDENT IN AN ORGANIZED HEALTH CARE EDUCATION/TRAINING PROGRAM

## 2024-11-11 PROCEDURE — 82948 REAGENT STRIP/BLOOD GLUCOSE: CPT

## 2024-11-11 PROCEDURE — 85347 COAGULATION TIME ACTIVATED: CPT

## 2024-11-11 PROCEDURE — 35151 REPAIR DEFECT OF ARTERY: CPT | Performed by: PHYSICIAN ASSISTANT

## 2024-11-11 PROCEDURE — 35151 REPAIR DEFECT OF ARTERY: CPT | Performed by: STUDENT IN AN ORGANIZED HEALTH CARE EDUCATION/TRAINING PROGRAM

## 2024-11-11 PROCEDURE — NC001 PR NO CHARGE: Performed by: STUDENT IN AN ORGANIZED HEALTH CARE EDUCATION/TRAINING PROGRAM

## 2024-11-11 PROCEDURE — 04LN0ZZ OCCLUSION OF LEFT POPLITEAL ARTERY, OPEN APPROACH: ICD-10-PCS | Performed by: STUDENT IN AN ORGANIZED HEALTH CARE EDUCATION/TRAINING PROGRAM

## 2024-11-11 PROCEDURE — 86900 BLOOD TYPING SEROLOGIC ABO: CPT | Performed by: ANESTHESIOLOGY

## 2024-11-11 PROCEDURE — 93010 ELECTROCARDIOGRAM REPORT: CPT | Performed by: INTERNAL MEDICINE

## 2024-11-11 PROCEDURE — 85049 AUTOMATED PLATELET COUNT: CPT | Performed by: PHYSICIAN ASSISTANT

## 2024-11-11 DEVICE — PROPATEN VASCULAR GRAFT TW RR 6MMX50CM 40CM RINGS HEPARIN
Type: IMPLANTABLE DEVICE | Site: LEG | Status: FUNCTIONAL
Brand: GORE PROPATEN VASCULAR GRAFT

## 2024-11-11 RX ORDER — TAMSULOSIN HYDROCHLORIDE 0.4 MG/1
0.4 CAPSULE ORAL EVERY MORNING
Status: DISCONTINUED | OUTPATIENT
Start: 2024-11-12 | End: 2024-11-14 | Stop reason: HOSPADM

## 2024-11-11 RX ORDER — LIDOCAINE HYDROCHLORIDE 10 MG/ML
INJECTION, SOLUTION EPIDURAL; INFILTRATION; INTRACAUDAL; PERINEURAL AS NEEDED
Status: DISCONTINUED | OUTPATIENT
Start: 2024-11-11 | End: 2024-11-11

## 2024-11-11 RX ORDER — LABETALOL HYDROCHLORIDE 5 MG/ML
5 INJECTION, SOLUTION INTRAVENOUS ONCE
Status: DISCONTINUED | OUTPATIENT
Start: 2024-11-11 | End: 2024-11-14

## 2024-11-11 RX ORDER — ONDANSETRON 2 MG/ML
INJECTION INTRAMUSCULAR; INTRAVENOUS AS NEEDED
Status: DISCONTINUED | OUTPATIENT
Start: 2024-11-11 | End: 2024-11-11

## 2024-11-11 RX ORDER — METOCLOPRAMIDE HYDROCHLORIDE 5 MG/ML
10 INJECTION INTRAMUSCULAR; INTRAVENOUS ONCE AS NEEDED
Status: DISCONTINUED | OUTPATIENT
Start: 2024-11-11 | End: 2024-11-11

## 2024-11-11 RX ORDER — SODIUM CHLORIDE 9 MG/ML
INJECTION, SOLUTION INTRAVENOUS CONTINUOUS PRN
Status: DISCONTINUED | OUTPATIENT
Start: 2024-11-11 | End: 2024-11-11

## 2024-11-11 RX ORDER — HEPARIN SODIUM 5000 [USP'U]/ML
5000 INJECTION, SOLUTION INTRAVENOUS; SUBCUTANEOUS EVERY 8 HOURS SCHEDULED
Status: DISCONTINUED | OUTPATIENT
Start: 2024-11-11 | End: 2024-11-13

## 2024-11-11 RX ORDER — ONDANSETRON 2 MG/ML
4 INJECTION INTRAMUSCULAR; INTRAVENOUS ONCE AS NEEDED
Status: DISCONTINUED | OUTPATIENT
Start: 2024-11-11 | End: 2024-11-11

## 2024-11-11 RX ORDER — HEPARIN SODIUM 1000 [USP'U]/ML
INJECTION, SOLUTION INTRAVENOUS; SUBCUTANEOUS AS NEEDED
Status: DISCONTINUED | OUTPATIENT
Start: 2024-11-11 | End: 2024-11-11

## 2024-11-11 RX ORDER — ONDANSETRON 2 MG/ML
4 INJECTION INTRAMUSCULAR; INTRAVENOUS EVERY 6 HOURS PRN
Status: DISCONTINUED | OUTPATIENT
Start: 2024-11-11 | End: 2024-11-14 | Stop reason: HOSPADM

## 2024-11-11 RX ORDER — EPHEDRINE SULFATE 50 MG/ML
INJECTION INTRAVENOUS AS NEEDED
Status: DISCONTINUED | OUTPATIENT
Start: 2024-11-11 | End: 2024-11-11

## 2024-11-11 RX ORDER — FENTANYL CITRATE/PF 50 MCG/ML
25 SYRINGE (ML) INJECTION
Status: DISCONTINUED | OUTPATIENT
Start: 2024-11-11 | End: 2024-11-11

## 2024-11-11 RX ORDER — METOPROLOL SUCCINATE 25 MG/1
25 TABLET, EXTENDED RELEASE ORAL
Status: DISCONTINUED | OUTPATIENT
Start: 2024-11-11 | End: 2024-11-14 | Stop reason: HOSPADM

## 2024-11-11 RX ORDER — MAGNESIUM HYDROXIDE 1200 MG/15ML
LIQUID ORAL AS NEEDED
Status: DISCONTINUED | OUTPATIENT
Start: 2024-11-11 | End: 2024-11-11 | Stop reason: HOSPADM

## 2024-11-11 RX ORDER — GLYCOPYRROLATE 0.2 MG/ML
INJECTION INTRAMUSCULAR; INTRAVENOUS AS NEEDED
Status: DISCONTINUED | OUTPATIENT
Start: 2024-11-11 | End: 2024-11-11

## 2024-11-11 RX ORDER — CLOPIDOGREL BISULFATE 75 MG/1
75 TABLET ORAL DAILY
Status: DISCONTINUED | OUTPATIENT
Start: 2024-11-12 | End: 2024-11-14 | Stop reason: HOSPADM

## 2024-11-11 RX ORDER — ROCURONIUM BROMIDE 10 MG/ML
INJECTION, SOLUTION INTRAVENOUS AS NEEDED
Status: DISCONTINUED | OUTPATIENT
Start: 2024-11-11 | End: 2024-11-11

## 2024-11-11 RX ORDER — HYDROMORPHONE HCL/PF 1 MG/ML
0.25 SYRINGE (ML) INJECTION
Status: DISCONTINUED | OUTPATIENT
Start: 2024-11-11 | End: 2024-11-11

## 2024-11-11 RX ORDER — FENTANYL CITRATE 50 UG/ML
INJECTION, SOLUTION INTRAMUSCULAR; INTRAVENOUS AS NEEDED
Status: DISCONTINUED | OUTPATIENT
Start: 2024-11-11 | End: 2024-11-11

## 2024-11-11 RX ORDER — HYDROMORPHONE HCL/PF 1 MG/ML
SYRINGE (ML) INJECTION AS NEEDED
Status: DISCONTINUED | OUTPATIENT
Start: 2024-11-11 | End: 2024-11-11

## 2024-11-11 RX ORDER — DEXAMETHASONE SODIUM PHOSPHATE 10 MG/ML
INJECTION, SOLUTION INTRAMUSCULAR; INTRAVENOUS AS NEEDED
Status: DISCONTINUED | OUTPATIENT
Start: 2024-11-11 | End: 2024-11-11

## 2024-11-11 RX ORDER — HYDROMORPHONE HCL/PF 1 MG/ML
0.2 SYRINGE (ML) INJECTION EVERY 2 HOUR PRN
Status: DISCONTINUED | OUTPATIENT
Start: 2024-11-11 | End: 2024-11-13

## 2024-11-11 RX ORDER — PROTAMINE SULFATE 10 MG/ML
INJECTION, SOLUTION INTRAVENOUS AS NEEDED
Status: DISCONTINUED | OUTPATIENT
Start: 2024-11-11 | End: 2024-11-11

## 2024-11-11 RX ORDER — LIDOCAINE HYDROCHLORIDE 10 MG/ML
0.5 INJECTION, SOLUTION EPIDURAL; INFILTRATION; INTRACAUDAL; PERINEURAL ONCE AS NEEDED
Status: DISCONTINUED | OUTPATIENT
Start: 2024-11-11 | End: 2024-11-11

## 2024-11-11 RX ORDER — CEFAZOLIN SODIUM 1 G/3ML
INJECTION, POWDER, FOR SOLUTION INTRAMUSCULAR; INTRAVENOUS AS NEEDED
Status: DISCONTINUED | OUTPATIENT
Start: 2024-11-11 | End: 2024-11-11

## 2024-11-11 RX ORDER — CEFAZOLIN SODIUM 2 G/50ML
2000 SOLUTION INTRAVENOUS ONCE
Status: DISCONTINUED | OUTPATIENT
Start: 2024-11-11 | End: 2024-11-11

## 2024-11-11 RX ORDER — CHLORHEXIDINE GLUCONATE ORAL RINSE 1.2 MG/ML
15 SOLUTION DENTAL ONCE
Status: COMPLETED | OUTPATIENT
Start: 2024-11-11 | End: 2024-11-11

## 2024-11-11 RX ORDER — SODIUM CHLORIDE, SODIUM LACTATE, POTASSIUM CHLORIDE, CALCIUM CHLORIDE 600; 310; 30; 20 MG/100ML; MG/100ML; MG/100ML; MG/100ML
125 INJECTION, SOLUTION INTRAVENOUS CONTINUOUS
Status: DISCONTINUED | OUTPATIENT
Start: 2024-11-11 | End: 2024-11-12

## 2024-11-11 RX ORDER — DOCUSATE SODIUM 100 MG/1
100 CAPSULE, LIQUID FILLED ORAL 2 TIMES DAILY
Status: DISCONTINUED | OUTPATIENT
Start: 2024-11-11 | End: 2024-11-14 | Stop reason: HOSPADM

## 2024-11-11 RX ORDER — PROPOFOL 10 MG/ML
INJECTION, EMULSION INTRAVENOUS AS NEEDED
Status: DISCONTINUED | OUTPATIENT
Start: 2024-11-11 | End: 2024-11-11

## 2024-11-11 RX ORDER — FOLIC ACID 1 MG/1
1 TABLET ORAL DAILY
Status: DISCONTINUED | OUTPATIENT
Start: 2024-11-11 | End: 2024-11-14 | Stop reason: HOSPADM

## 2024-11-11 RX ORDER — PAPAVERINE HYDROCHLORIDE 30 MG/ML
INJECTION INTRAMUSCULAR; INTRAVENOUS AS NEEDED
Status: DISCONTINUED | OUTPATIENT
Start: 2024-11-11 | End: 2024-11-11 | Stop reason: HOSPADM

## 2024-11-11 RX ORDER — EZETIMIBE 10 MG/1
10 TABLET ORAL DAILY
Status: DISCONTINUED | OUTPATIENT
Start: 2024-11-11 | End: 2024-11-14 | Stop reason: HOSPADM

## 2024-11-11 RX ORDER — ACETAMINOPHEN 325 MG/1
650 TABLET ORAL EVERY 4 HOURS PRN
Status: DISCONTINUED | OUTPATIENT
Start: 2024-11-11 | End: 2024-11-13

## 2024-11-11 RX ORDER — OXYCODONE HYDROCHLORIDE 5 MG/1
5 TABLET ORAL EVERY 4 HOURS PRN
Status: DISCONTINUED | OUTPATIENT
Start: 2024-11-11 | End: 2024-11-14 | Stop reason: HOSPADM

## 2024-11-11 RX ADMIN — SODIUM CHLORIDE, SODIUM LACTATE, POTASSIUM CHLORIDE, AND CALCIUM CHLORIDE 125 ML/HR: .6; .31; .03; .02 INJECTION, SOLUTION INTRAVENOUS at 17:57

## 2024-11-11 RX ADMIN — PROPOFOL 100 MG: 10 INJECTION, EMULSION INTRAVENOUS at 10:25

## 2024-11-11 RX ADMIN — EZETIMIBE 10 MG: 10 TABLET ORAL at 17:08

## 2024-11-11 RX ADMIN — EPHEDRINE SULFATE 10 MG: 50 INJECTION, SOLUTION INTRAVENOUS at 14:36

## 2024-11-11 RX ADMIN — ROCURONIUM BROMIDE 20 MG: 10 INJECTION, SOLUTION INTRAVENOUS at 13:30

## 2024-11-11 RX ADMIN — HYDROMORPHONE HYDROCHLORIDE 0.5 MG: 1 INJECTION, SOLUTION INTRAMUSCULAR; INTRAVENOUS; SUBCUTANEOUS at 11:58

## 2024-11-11 RX ADMIN — HEPARIN SODIUM 9000 UNITS: 1000 INJECTION INTRAVENOUS; SUBCUTANEOUS at 12:39

## 2024-11-11 RX ADMIN — HEPARIN SODIUM 3000 UNITS: 1000 INJECTION INTRAVENOUS; SUBCUTANEOUS at 13:23

## 2024-11-11 RX ADMIN — FENTANYL CITRATE 25 MCG: 50 INJECTION INTRAMUSCULAR; INTRAVENOUS at 14:46

## 2024-11-11 RX ADMIN — FENTANYL CITRATE 50 MCG: 50 INJECTION INTRAMUSCULAR; INTRAVENOUS at 14:59

## 2024-11-11 RX ADMIN — GLYCOPYRROLATE 0.1 MG: 0.2 INJECTION, SOLUTION INTRAMUSCULAR; INTRAVENOUS at 10:50

## 2024-11-11 RX ADMIN — ONDANSETRON 4 MG: 2 INJECTION INTRAMUSCULAR; INTRAVENOUS at 14:49

## 2024-11-11 RX ADMIN — SUGAMMADEX 200 MG: 100 INJECTION, SOLUTION INTRAVENOUS at 15:01

## 2024-11-11 RX ADMIN — FENTANYL CITRATE 25 MCG: 50 INJECTION INTRAMUSCULAR; INTRAVENOUS at 14:51

## 2024-11-11 RX ADMIN — PHENYLEPHRINE HYDROCHLORIDE 100 MCG: 10 INJECTION INTRAVENOUS at 13:59

## 2024-11-11 RX ADMIN — FOLIC ACID 1 MG: 1 TABLET ORAL at 17:08

## 2024-11-11 RX ADMIN — METOPROLOL SUCCINATE 25 MG: 25 TABLET, EXTENDED RELEASE ORAL at 21:30

## 2024-11-11 RX ADMIN — HEPARIN SODIUM 5000 UNITS: 5000 INJECTION, SOLUTION INTRAVENOUS; SUBCUTANEOUS at 17:09

## 2024-11-11 RX ADMIN — CEFAZOLIN 2000 MG: 1 INJECTION, POWDER, FOR SOLUTION INTRAMUSCULAR; INTRAVENOUS at 14:29

## 2024-11-11 RX ADMIN — Medication 2.5 MG: at 17:08

## 2024-11-11 RX ADMIN — PROPOFOL 200 MG: 10 INJECTION, EMULSION INTRAVENOUS at 10:20

## 2024-11-11 RX ADMIN — PHENYLEPHRINE HYDROCHLORIDE 100 MCG: 10 INJECTION INTRAVENOUS at 10:47

## 2024-11-11 RX ADMIN — ROCURONIUM BROMIDE 20 MG: 10 INJECTION, SOLUTION INTRAVENOUS at 11:59

## 2024-11-11 RX ADMIN — HYDROMORPHONE HYDROCHLORIDE 0.2 MG: 1 INJECTION, SOLUTION INTRAMUSCULAR; INTRAVENOUS; SUBCUTANEOUS at 18:12

## 2024-11-11 RX ADMIN — DOCUSATE SODIUM 100 MG: 100 CAPSULE, LIQUID FILLED ORAL at 17:08

## 2024-11-11 RX ADMIN — DEXMEDETOMIDINE HYDROCHLORIDE 8 MCG: 100 INJECTION, SOLUTION INTRAVENOUS at 13:42

## 2024-11-11 RX ADMIN — CEFAZOLIN 2000 MG: 1 INJECTION, POWDER, FOR SOLUTION INTRAMUSCULAR; INTRAVENOUS at 10:31

## 2024-11-11 RX ADMIN — CHLORHEXIDINE GLUCONATE 0.12% ORAL RINSE 15 ML: 1.2 LIQUID ORAL at 09:13

## 2024-11-11 RX ADMIN — ROCURONIUM BROMIDE 30 MG: 10 INJECTION, SOLUTION INTRAVENOUS at 11:01

## 2024-11-11 RX ADMIN — DEXMEDETOMIDINE HYDROCHLORIDE 4 MCG: 100 INJECTION, SOLUTION INTRAVENOUS at 13:30

## 2024-11-11 RX ADMIN — PHENYLEPHRINE HYDROCHLORIDE 100 MCG: 10 INJECTION INTRAVENOUS at 13:49

## 2024-11-11 RX ADMIN — PHENYLEPHRINE HYDROCHLORIDE 100 MCG: 10 INJECTION INTRAVENOUS at 10:31

## 2024-11-11 RX ADMIN — FENTANYL CITRATE 100 MCG: 50 INJECTION INTRAMUSCULAR; INTRAVENOUS at 10:20

## 2024-11-11 RX ADMIN — LIDOCAINE HYDROCHLORIDE 50 MG: 10 INJECTION, SOLUTION EPIDURAL; INFILTRATION; INTRACAUDAL; PERINEURAL at 10:20

## 2024-11-11 RX ADMIN — HYDROMORPHONE HYDROCHLORIDE 0.5 MG: 1 INJECTION, SOLUTION INTRAMUSCULAR; INTRAVENOUS; SUBCUTANEOUS at 11:07

## 2024-11-11 RX ADMIN — DEXMEDETOMIDINE HYDROCHLORIDE 8 MCG: 100 INJECTION, SOLUTION INTRAVENOUS at 11:54

## 2024-11-11 RX ADMIN — SODIUM CHLORIDE: 0.9 INJECTION, SOLUTION INTRAVENOUS at 10:30

## 2024-11-11 RX ADMIN — HEPARIN SODIUM 5000 UNITS: 5000 INJECTION, SOLUTION INTRAVENOUS; SUBCUTANEOUS at 21:30

## 2024-11-11 RX ADMIN — PHENYLEPHRINE HYDROCHLORIDE 50 MCG/MIN: 10 INJECTION INTRAVENOUS at 10:29

## 2024-11-11 RX ADMIN — PROTAMINE SULFATE 50 MG: 10 INJECTION, SOLUTION INTRAVENOUS at 14:31

## 2024-11-11 RX ADMIN — SODIUM CHLORIDE, SODIUM LACTATE, POTASSIUM CHLORIDE, AND CALCIUM CHLORIDE 125 ML/HR: .6; .31; .03; .02 INJECTION, SOLUTION INTRAVENOUS at 09:12

## 2024-11-11 RX ADMIN — DEXAMETHASONE SODIUM PHOSPHATE 10 MG: 10 INJECTION, SOLUTION INTRAMUSCULAR; INTRAVENOUS at 10:50

## 2024-11-11 RX ADMIN — ROCURONIUM BROMIDE 50 MG: 10 INJECTION, SOLUTION INTRAVENOUS at 10:20

## 2024-11-11 NOTE — ANESTHESIA PROCEDURE NOTES
Arterial Line Insertion    Performed by: Da Yang MD  Authorized by: Da Yang MD  Consent: Verbal consent obtained. Written consent obtained.  Risks and benefits: risks, benefits and alternatives were discussed  Consent given by: patient  Patient understanding: patient states understanding of the procedure being performed  Patient consent: the patient's understanding of the procedure matches consent given  Procedure consent: procedure consent matches procedure scheduled  Relevant documents: relevant documents present and verified  Test results: test results available and properly labeled  Patient identity confirmed: arm band and verbally with patient  Preparation: Patient was prepped and draped in the usual sterile fashion.  Indications: hemodynamic monitoring  Orientation:  Left  Location: radial artery  Sedation:  Patient sedated: yes  Sedatives: see MAR for details  Analgesia: see MAR for details    Procedure Details:  Jorge's test normal: yes  Needle gauge: 20  Seldinger technique: Seldinger technique used  Number of attempts: 1    Post-procedure:  Post-procedure: dressing applied  Patient tolerance: Patient tolerated the procedure well with no immediate complications

## 2024-11-11 NOTE — PLAN OF CARE
Problem: PAIN - ADULT  Goal: Verbalizes/displays adequate comfort level or baseline comfort level  Description: Interventions:  - Encourage patient to monitor pain and request assistance  - Assess pain using appropriate pain scale  - Administer analgesics based on type and severity of pain and evaluate response  - Implement non-pharmacological measures as appropriate and evaluate response  - Consider cultural and social influences on pain and pain management  - Notify physician/advanced practitioner if interventions unsuccessful or patient reports new pain  Outcome: Progressing     Problem: INFECTION - ADULT  Goal: Absence or prevention of progression during hospitalization  Description: INTERVENTIONS:  - Assess and monitor for signs and symptoms of infection  - Monitor lab/diagnostic results  - Monitor all insertion sites, i.e. indwelling lines, tubes, and drains  - Monitor endotracheal if appropriate and nasal secretions for changes in amount and color  - Leon appropriate cooling/warming therapies per order  - Administer medications as ordered  - Instruct and encourage patient and family to use good hand hygiene technique  - Identify and instruct in appropriate isolation precautions for identified infection/condition  Outcome: Progressing  Goal: Absence of fever/infection during neutropenic period  Description: INTERVENTIONS:  - Monitor WBC    Outcome: Progressing     Problem: SAFETY ADULT  Goal: Patient will remain free of falls  Description: INTERVENTIONS:  - Educate patient/family on patient safety including physical limitations  - Instruct patient to call for assistance with activity   - Consult OT/PT to assist with strengthening/mobility   - Keep Call bell within reach  - Keep bed low and locked with side rails adjusted as appropriate  - Keep care items and personal belongings within reach  - Initiate and maintain comfort rounds  - Make Fall Risk Sign visible to staff  - Apply yellow socks and bracelet  for high fall risk patients  - Consider moving patient to room near nurses station  Outcome: Progressing  Goal: Maintain or return to baseline ADL function  Description: INTERVENTIONS:  -  Assess patient's ability to carry out ADLs; assess patient's baseline for ADL function and identify physical deficits which impact ability to perform ADLs (bathing, care of mouth/teeth, toileting, grooming, dressing, etc.)  - Assess/evaluate cause of self-care deficits   - Assess range of motion  - Assess patient's mobility; develop plan if impaired  - Assess patient's need for assistive devices and provide as appropriate  - Encourage maximum independence but intervene and supervise when necessary  - Involve family in performance of ADLs  - Assess for home care needs following discharge   - Consider OT consult to assist with ADL evaluation and planning for discharge  - Provide patient education as appropriate  Outcome: Progressing  Goal: Maintains/Returns to pre admission functional level  Description: INTERVENTIONS:  - Perform AM-PAC 6 Click Basic Mobility/ Daily Activity assessment daily.  - Set and communicate daily mobility goal to care team and patient/family/caregiver.   - Collaborate with rehabilitation services on mobility goals if consulted  - Perform Range of Motion 3 times a day.  - Reposition patient every 3 hours.  - Dangle patient 3 times a day  - Stand patient 3 times a day  - Ambulate patient 3 times a day  - Out of bed to chair 3 times a day   - Out of bed for meals 3 times a day  - Out of bed for toileting  - Record patient progress and toleration of activity level   Outcome: Progressing     Problem: DISCHARGE PLANNING  Goal: Discharge to home or other facility with appropriate resources  Description: INTERVENTIONS:  - Identify barriers to discharge w/patient and caregiver  - Arrange for needed discharge resources and transportation as appropriate  - Identify discharge learning needs (meds, wound care, etc.)  -  Arrange for interpretive services to assist at discharge as needed  - Refer to Case Management Department for coordinating discharge planning if the patient needs post-hospital services based on physician/advanced practitioner order or complex needs related to functional status, cognitive ability, or social support system  Outcome: Progressing     Problem: Knowledge Deficit  Goal: Patient/family/caregiver demonstrates understanding of disease process, treatment plan, medications, and discharge instructions  Description: Complete learning assessment and assess knowledge base.  Interventions:  - Provide teaching at level of understanding  - Provide teaching via preferred learning methods  Outcome: Progressing     Problem: NEUROSENSORY - ADULT  Goal: Achieves stable or improved neurological status  Description: INTERVENTIONS  - Monitor and report changes in neurological status  - Monitor vital signs such as temperature, blood pressure, glucose, and any other labs ordered   - Initiate measures to prevent increased intracranial pressure  - Monitor for seizure activity and implement precautions if appropriate      Outcome: Progressing  Goal: Remains free of injury related to seizures activity  Description: INTERVENTIONS  - Maintain airway, patient safety  and administer oxygen as ordered  - Monitor patient for seizure activity, document and report duration and description of seizure to physician/advanced practitioner  - If seizure occurs,  ensure patient safety during seizure  - Reorient patient post seizure  - Seizure pads on all 4 side rails  - Instruct patient/family to notify RN of any seizure activity including if an aura is experienced  - Instruct patient/family to call for assistance with activity based on nursing assessment  - Administer anti-seizure medications if ordered    Outcome: Progressing  Goal: Achieves maximal functionality and self care  Description: INTERVENTIONS  - Monitor swallowing and airway  patency with patient fatigue and changes in neurological status  - Encourage and assist patient to increase activity and self care.   - Encourage visually impaired, hearing impaired and aphasic patients to use assistive/communication devices  Outcome: Progressing     Problem: CARDIOVASCULAR - ADULT  Goal: Maintains optimal cardiac output and hemodynamic stability  Description: INTERVENTIONS:  - Monitor I/O, vital signs and rhythm  - Monitor for S/S and trends of decreased cardiac output  - Administer and titrate ordered vasoactive medications to optimize hemodynamic stability  - Assess quality of pulses, skin color and temperature  - Assess for signs of decreased coronary artery perfusion  - Instruct patient to report change in severity of symptoms  Outcome: Progressing  Goal: Absence of cardiac dysrhythmias or at baseline rhythm  Description: INTERVENTIONS:  - Continuous cardiac monitoring, vital signs, obtain 12 lead EKG if ordered  - Administer antiarrhythmic and heart rate control medications as ordered  - Monitor electrolytes and administer replacement therapy as ordered  Outcome: Progressing     Problem: RESPIRATORY - ADULT  Goal: Achieves optimal ventilation and oxygenation  Description: INTERVENTIONS:  - Assess for changes in respiratory status  - Assess for changes in mentation and behavior  - Position to facilitate oxygenation and minimize respiratory effort  - Oxygen administered by appropriate delivery if ordered  - Initiate smoking cessation education as indicated  - Encourage broncho-pulmonary hygiene including cough, deep breathe, Incentive Spirometry  - Assess the need for suctioning and aspirate as needed  - Assess and instruct to report SOB or any respiratory difficulty  - Respiratory Therapy support as indicated  Outcome: Progressing     Problem: GASTROINTESTINAL - ADULT  Goal: Minimal or absence of nausea and/or vomiting  Description: INTERVENTIONS:  - Administer IV fluids if ordered to ensure  adequate hydration  - Maintain NPO status until nausea and vomiting are resolved  - Nasogastric tube if ordered  - Administer ordered antiemetic medications as needed  - Provide nonpharmacologic comfort measures as appropriate  - Advance diet as tolerated, if ordered  - Consider nutrition services referral to assist patient with adequate nutrition and appropriate food choices  Outcome: Progressing  Goal: Maintains or returns to baseline bowel function  Description: INTERVENTIONS:  - Assess bowel function  - Encourage oral fluids to ensure adequate hydration  - Administer IV fluids if ordered to ensure adequate hydration  - Administer ordered medications as needed  - Encourage mobilization and activity  - Consider nutritional services referral to assist patient with adequate nutrition and appropriate food choices  Outcome: Progressing  Goal: Maintains adequate nutritional intake  Description: INTERVENTIONS:  - Monitor percentage of each meal consumed  - Identify factors contributing to decreased intake, treat as appropriate  - Assist with meals as needed  - Monitor I&O, weight, and lab values if indicated  - Obtain nutrition services referral as needed  Outcome: Progressing  Goal: Establish and maintain optimal ostomy function  Description: INTERVENTIONS:  - Assess bowel function  - Encourage oral fluids to ensure adequate hydration  - Administer IV fluids if ordered to ensure adequate hydration   - Administer ordered medications as needed  - Encourage mobilization and activity  - Nutrition services referral to assist patient with appropriate food choices  - Assess stoma site  - Consider wound care consult   Outcome: Progressing  Goal: Oral mucous membranes remain intact  Description: INTERVENTIONS  - Assess oral mucosa and hygiene practices  - Implement preventative oral hygiene regimen  - Implement oral medicated treatments as ordered  - Initiate Nutrition services referral as needed  Outcome: Progressing      Problem: GENITOURINARY - ADULT  Goal: Maintains or returns to baseline urinary function  Description: INTERVENTIONS:  - Assess urinary function  - Encourage oral fluids to ensure adequate hydration if ordered  - Administer IV fluids as ordered to ensure adequate hydration  - Administer ordered medications as needed  - Offer frequent toileting  - Follow urinary retention protocol if ordered  Outcome: Progressing  Goal: Absence of urinary retention  Description: INTERVENTIONS:  - Assess patient’s ability to void and empty bladder  - Monitor I/O  - Bladder scan as needed  - Discuss with physician/AP medications to alleviate retention as needed  - Discuss catheterization for long term situations as appropriate  Outcome: Progressing  Goal: Urinary catheter remains patent  Description: INTERVENTIONS:  - Assess patency of urinary catheter  - If patient has a chronic gonzalez, consider changing catheter if non-functioning  - Follow guidelines for intermittent irrigation of non-functioning urinary catheter  Outcome: Progressing     Problem: METABOLIC, FLUID AND ELECTROLYTES - ADULT  Goal: Electrolytes maintained within normal limits  Description: INTERVENTIONS:  - Monitor labs and assess patient for signs and symptoms of electrolyte imbalances  - Administer electrolyte replacement as ordered  - Monitor response to electrolyte replacements, including repeat lab results as appropriate  - Instruct patient on fluid and nutrition as appropriate  Outcome: Progressing  Goal: Fluid balance maintained  Description: INTERVENTIONS:  - Monitor labs   - Monitor I/O and WT  - Instruct patient on fluid and nutrition as appropriate  - Assess for signs & symptoms of volume excess or deficit  Outcome: Progressing  Goal: Glucose maintained within target range  Description: INTERVENTIONS:  - Monitor Blood Glucose as ordered  - Assess for signs and symptoms of hyperglycemia and hypoglycemia  - Administer ordered medications to maintain glucose  within target range  - Assess nutritional intake and initiate nutrition service referral as needed  Outcome: Progressing      Problem: HEMATOLOGIC - ADULT  Goal: Maintains hematologic stability  Description: INTERVENTIONS  - Assess for signs and symptoms of bleeding or hemorrhage  - Monitor labs  - Administer supportive blood products/factors as ordered and appropriate  Outcome: Progressing     Problem: MUSCULOSKELETAL - ADULT  Goal: Maintain or return mobility to safest level of function  Description: INTERVENTIONS:  - Assess patient's ability to carry out ADLs; assess patient's baseline for ADL function and identify physical deficits which impact ability to perform ADLs (bathing, care of mouth/teeth, toileting, grooming, dressing, etc.)  - Assess/evaluate cause of self-care deficits   - Assess range of motion  - Assess patient's mobility  - Assess patient's need for assistive devices and provide as appropriate  - Encourage maximum independence but intervene and supervise when necessary  - Involve family in performance of ADLs  - Assess for home care needs following discharge   - Consider OT consult to assist with ADL evaluation and planning for discharge  - Provide patient education as appropriate  Outcome: Progressing  Goal: Maintain proper alignment of affected body part  Description: INTERVENTIONS:  - Support, maintain and protect limb and body alignment  - Provide patient/ family with appropriate education  Outcome: Progressing

## 2024-11-11 NOTE — H&P
Acute Care Surgery  History and Physical  Jose Elias Mcgrath 69 y.o. male MRN: [id number]  Unit/Bed#: OR POOL Encounter: [id number]    Assessment and Plan:  Jose Elias Mcgrath is a 69 y.o. male with bilateral popliteal aneurysms.  The left is larger than the right.  They are both currently asymptomatic at this time.  With respect to the skin issues and swelling discussed below, the swelling is much improved today and his eczema/psoriasis extends up to about the level of the ankles.  Do not anticipate this will interfere with surgery.  Discussed the indication, rationale, and risks for surgical intervention of popliteal aneurysms.  Dr. Riggins answered all of Mr. Mcgrath's questions to his satisfaction and he wished to proceed with surgery.  Given that the left aneurysm is larger, we will plan to proceed with that side first.     -OR today for L fem-peroneal bypass  -NPO for OR      History of Present Illness   Jose Elias Mcgrath is a 69 y.o. male with PMH of NSTEMI s/p PCI in 2021 (last echo 7/2023 EF 55%, no clinically significant AS or valvular regurgitation). On eliquis for BL pop aneurysms and plavix (transitioned from brillinta due to starting eliquis). He presents today to discuss BL pop aneurysms, which were incidentally identified during recent hospitalization (details outlined below).      9/26/2024 CTA demonstrated 3.6 cm infrarenal AAA in addition to left 2.6 cm popliteal aneurysm with mural thrombus and a right 1.5 cm popliteal aneurysm.  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  He does fortunately have preserved peroneal runoff bilaterally.  This appears to be a chronic process given the robust size of his peroneal arteries and he denies any pain/sensory/motor deficits.     Based on review of care everywhere notes he presented to an urgent care in 8/2023 with foot pain, had been treating himself for athlete's foot with antifungal cream.  He was given a prescription for 7d  doxycycline and 2d fluconazole.  He had been treated with 3 months of terbinafine prior to this.  Since 1/2024, he has been following with dermatology and treated with antifungal creams and Bactrim/Keflex.  He was also diagnosed with eczema and started on methotrexate for this but has not been taking it.      He was admitted on 9/24/2024 for bilateral lower extremity swelling and cellulitis.  He was treated with IV antibiotics and blood cultures were no growth x5d.  BL venous duplex was negative for DVT.  Underlying factor promoting cellulitis infection was determined to be poorly controlled eczema.  Plan for this was obtaining better control of his eczema in outpatient setting following successful treatment of cellulitis.    Review of Systems   HENT:  Negative for congestion.    Respiratory:  Negative for chest tightness.    All other systems reviewed and are negative.      Historical Information   Past Medical History:   Diagnosis Date    BPH (benign prostatic hyperplasia)     CAD (coronary artery disease)     Enlarged prostate     Hyperlipidemia     Hypertension     Vitamin D deficiency      Past Surgical History:   Procedure Laterality Date    COLONOSCOPY      CORONARY ANGIOPLASTY Left 02/26/2021    with heart cath    CORONARY STENT PLACEMENT      INGUINAL HERNIA REPAIR  11/29/2017    TONSILECTOMY AND ADNOIDECTOMY       Social History   Social History     Substance and Sexual Activity   Alcohol Use Not Currently     Social History     Substance and Sexual Activity   Drug Use Never     Social History     Tobacco Use   Smoking Status Every Day    Current packs/day: 1.00    Average packs/day: 1 pack/day for 40.0 years (40.0 ttl pk-yrs)    Types: Cigarettes    Start date: 10/30/1984   Smokeless Tobacco Never     Family History:   Family History   Problem Relation Age of Onset    Heart attack Father     Prostate cancer Father        Meds/Allergies   PTA meds:   Prior to Admission Medications   Prescriptions Last Dose  "Informant Patient Reported? Taking?   apixaban (Eliquis) 5 mg 11/8/2024 Self No Yes   Sig: Take 1 tablet (5 mg total) by mouth 2 (two) times a day   clopidogrel (PLAVIX) 75 mg tablet 11/10/2024 at 2200  No Yes   Sig: Take 1 tablet (75 mg total) by mouth daily   econazole nitrate 1 % cream More than a month  Yes No   Sig: APPLY TO FEET DAILY   ezetimibe (ZETIA) 10 mg tablet 11/10/2024 at 2200 Self Yes Yes   Sig: Take 10 mg by mouth daily   folic acid (FOLVITE) 1 mg tablet  Self Yes No   Sig: Take 1 mg by mouth daily Everyday except methotrexate days   Patient not taking: Reported on 10/1/2024   furosemide (LASIX) 20 mg tablet   Yes Yes   Sig: Take 20 mg by mouth daily   losartan (COZAAR) 25 mg tablet 11/10/2024 at 2200 Self Yes Yes   methotrexate 2.5 MG tablet  Self Yes No   Sig: Take 2.5 mg by mouth every 12 hours for 3 doses only each week   Patient not taking: Reported on 10/1/2024   metoprolol succinate (TOPROL-XL) 25 mg 24 hr tablet 11/10/2024 at 2200 Self Yes Yes   rosuvastatin (CRESTOR) 40 MG tablet  Self Yes No   Patient not taking: Reported on 10/1/2024   tamsulosin (FLOMAX) 0.4 mg 11/10/2024 at 2200 Self Yes Yes   Sig: Take 0.4 mg by mouth every morning   varenicline (CHANTIX) 1 mg tablet  Self Yes No   Sig: Days 1 to 3: Oral: 0.5 mg once dailyDays 4 to 7: Oral: 0.5 mg twice daily  There after Oral: 1 mg twice daily for 11 weeks   Patient not taking: Reported on 8/3/2022      Facility-Administered Medications: None     No Known Allergies    Objective   First Vitals:   Blood Pressure: 144/81 (11/11/24 0847)  Pulse: 86 (11/11/24 0847)  Temperature: 98.5 °F (36.9 °C) (11/11/24 0847)  Temp Source: Temporal (11/11/24 0847)  Respirations: 18 (11/11/24 0847)  Height: 5' 9\" (175.3 cm) (11/11/24 0847)  Weight - Scale: 81.6 kg (180 lb) (11/11/24 0847)  SpO2: 98 % (11/11/24 0847)    Current Vitals:   Blood Pressure: 144/81 (11/11/24 0847)  Pulse: 86 (11/11/24 0847)  Temperature: 98.5 °F (36.9 °C) (11/11/24 0847)  Temp " "Source: Temporal (11/11/24 0847)  Respirations: 18 (11/11/24 0847)  Height: 5' 9\" (175.3 cm) (11/11/24 0847)  Weight - Scale: 81.6 kg (180 lb) (11/11/24 0847)  SpO2: 98 % (11/11/24 0847)    No intake or output data in the 24 hours ending 11/11/24 0917    Invasive Devices       Peripheral Intravenous Line  Duration             Peripheral IV 11/11/24 Dorsal (posterior);Left Hand <1 day                    Physical Exam  Vitals reviewed.   HENT:      Head: Normocephalic and atraumatic.      Nose: Nose normal.      Mouth/Throat:      Mouth: Mucous membranes are moist.   Eyes:      Extraocular Movements: Extraocular movements intact.   Cardiovascular:      Rate and Rhythm: Normal rate.   Pulmonary:      Effort: Pulmonary effort is normal. No respiratory distress.   Abdominal:      General: There is no distension.      Palpations: Abdomen is soft.   Musculoskeletal:         General: No swelling.   Skin:     General: Skin is warm.   Neurological:      General: No focal deficit present.      Mental Status: He is alert.   Psychiatric:         Mood and Affect: Mood normal.         Behavior: Behavior normal.             Code Status: Prior  Advance Directive and Living Will:      Power of :    POLST:      Counseling / Coordination of Care  Total floor / unit time spent today 20 minutes. This involved direct patient contact where I performed a full history and physical, reviewed previous records, and reviewed laboratory and other diagnostic studies. Greater than 50% of total time was spent with the patient and / or family counseling and / or coordination of care.    Ata Kang MD  11/11/2024    "

## 2024-11-11 NOTE — ANESTHESIA PREPROCEDURE EVALUATION
Procedure:  L pop aneurysm repair popliteal artery interposition with prone positioning. (Left: Leg Upper)    CAD NSTEMI s/p stent 2021    EF55%, DD1, trace valvular disease    COPD without recent exacerbation     Relevant Problems   CARDIO   (+) CAD (coronary artery disease)   (+) Hyperlipidemia      /RENAL   (+) Benign prostatic hyperplasia with urinary obstruction      PULMONARY   (+) Chronic obstructive pulmonary disease (COPD) (HCC)        Physical Exam    Airway    Mallampati score: II  TM Distance: >3 FB  Neck ROM: full     Dental       Cardiovascular  Rhythm: regular, Rate: normal, Cardiovascular exam normal    Pulmonary  Pulmonary exam normal Breath sounds clear to auscultation    Other Findings  Multiple missing teeth        Anesthesia Plan  ASA Score- 3     Anesthesia Type- general with ASA Monitors.         Additional Monitors: arterial line.    Airway Plan: ETT.    Comment: Risks/benefits and alternatives discussed including medication reaction, sore throat, aspiration, dental/oropharyngeal/ocular injuries, and/or grave/life threatening anesthetic and surgical emergencies..       Plan Factors-Exercise tolerance (METS): >4 METS.        Patient summary reviewed.      Patient instructed to abstain from smoking on day of procedure. Patient did not smoke on day of surgery.            Induction- intravenous.    Postoperative Plan- Plan for postoperative opioid use. Planned trial extubation        Informed Consent- Anesthetic plan and risks discussed with patient.  I personally reviewed this patient with the CRNA. Discussed and agreed on the Anesthesia Plan with the CRNA..

## 2024-11-11 NOTE — ANESTHESIA POSTPROCEDURE EVALUATION
Post-Op Assessment Note    CV Status:  Stable  Pain Score: 0    Pain management: adequate       Mental Status:  Sleepy and arousable   PONV Controlled:  None   Airway Patency:  Patent     Post Op Vitals Reviewed: Yes    No anethesia notable event occurred.    Staff: CRNA           Last Filed PACU Vitals:  Vitals Value Taken Time   Temp 97.1    Pulse 76 11/11/24 1528   /68 11/11/24 1524   Resp 18 11/11/24 1528   SpO2 93 % 11/11/24 1528   Vitals shown include unfiled device data.    Modified Lior:  No data recorded

## 2024-11-11 NOTE — OP NOTE
DATE:  11/11/24      PREOPERATIVE DIAGNOSIS:    - Left popliteal artery aneurysm      POSTOPERATIVE DIAGNOSIS:   - SAME     PROCEDURE PERFORMED:   - Left SFA to peroneal artery bypass with 6mm ringed PTFE (poor quality autogenous vein)  - Left popliteal artery aneurysm exclusion via proximal and distal ligation of popliteal artery     ATTENDING SURGEON:  - Elias Riggins MD     RESIDENT SURGEON(S):  - Surgeons and Role:     * Elias Riggins MD - Primary     * Anat Mills-JENNIFFER Fernando - Assisting     No qualified resident was available to assist during the procedure.                              BLOOD LOSS:  - 250 mL     ANESTHESIA:  - General endotracheal anesthesia      OPERATIVE FINDINGS:   - Friable/diseased left peroneal artery. Augmentation of left PT signal with graft compression following ligation of popliteal artery.          PROCEDURE IN DETAIL:  Informed consent was obtained from the patient prior to proceeding to the operating room.  The patient was then identified in the pre-anesthesia area, then taken to the operating room, placed in the supine position on the operating table, and induced under general endotracheal anesthesia. The patient was correctly positioned, padded at all pressure points. The left lower extremity was then prepared and draped in a sterile fashion.  A pre-operative timeout was performed. Preoperative antibiotics were administered at this time.      A longitudinal incision was made with a #15 blade on the medial aspect of the left thigh just above the knee and a few centimeters posterior to the femur.  Dissection was carried down through the subcutaneous tissue. The sartorius was identified and retracted posteriorly.  The avascular plane was entered and the SFA was dissected free circumferentially and encircled with vessel loops proximally and distally.     We then turned our attention to the peroneal artery exposure. A longitudinal incision was made with a #15 blade on  the medial aspect of the left lower extremity just below the knee and posterior to the tibia.  Dissection was carried down through the skin and subcutaneous tissue and the gastrocnemius was retracted posteriorly. The soleus muscle was divided at the tibia and retracted posteriorly. The peroneal artery was identified and gently  from the nerve and vein with a combination of sharp dissection with Metzenbaum scissors and blunt dissection.  The peroneal artery was then encircled proximally and distally with Silastic Vesseloops. A [place] tunneler was then used to create an anatomic tunnel between the SFA and peroneal artery.     The patient was then systemically heparinized. ACTs were periodically checked and heparin was re-dosed to maintain ACT >250 throughout the case.     After control of the femoral vessels with silastic vessel loops and an atraumatic vascular clamp placed on the distal EIA, an arteriotomy was created with a #11 blade and this was extended proximally and distally with Hardwick scissors.  The PTFE graft was brought onto the field.  The end of the graft was cut to size, spatulated, and beveled.  The proximal anastomosis was completed with running 5-0 prolene suture.  The graft was then brought through the previously created tunnel ensuring that no kinks or twists were introduced during the process. Appropriate 1:1 movement of the graft was appreciated after the tunneler was removed. A soft noncalcified region of the peroneal artery was identified. Proximal and distal Vesseloops were pulled up and a #11 blade was used to create a longitudinal arteriotomy. This was extended proximally distally with Hardwick scissors. The distal aspect of the graft was was cut to size, spatulated, and beveled. The anastomosis was then created with running 6-0 prolene suture. The native artery was backbled and the anastomosis was de-aired. A handheld doppler was used to identify the PT signal. The native popliteal artery  was test clamped with no change in signal indicating patency of the bypass. The popliteal artery was then ligated with 0 silk suture x2 both proximal and distal to the aneurysm. Following ligation, presence of a PT signal was again confirmed.      After completion of the anastomoses, the patient was given protamine. Good hemostasis was achieved in the wound beds. The wounds were closed in multiple layers of Vicryl suture, and the skin was closed with 4-0 Monocryl. The incisions were covered with dermabond and primapore dressings. The extremities were wrapped with kerlix and ACE wrap.      The patient was awoken from general anesthesia having tolerated the procedure well and transported to PACU in stable condition.      Elias Riggins MD  11/11/24   10:30 AM    Vascular Quality Initiative - Infra-Inguinal Bypass   [id number]]          Urgency: Elective     Side: left       Functional Status:  Fully active; able to carry on all predisease activities without restriction.   Ambulation: Amb = independently ambulatory       Ambulation:  Amb = independently ambulatory    Exercise Program:  NO        Pathology:Aneurysm    Anesthesia: General  ASA Class: ASA 3 - Patient with moderate systemic disease with functional limitations    Skin Prep:Chlorhexidene    Graft Origin: SFA    Graft Recipient: Peroneal    Graft Vein Type: None    Number of Vein Segments: None    Prosthetic: PTFE      Groin Incision: NA    Total Procedure Time: No case tracking events are documented in the log.    EBL: 250    Adjuncts:  Vein Cuff:  no Sequential Graft:no    Concomitant Proximal Ipsilateral:   PVI: no       Endarterectomy:no         Supra-inguinal Bypass: no        NO    Completion Study:   Doppler: yes   Duplex: no    Arteriogram: no

## 2024-11-12 ENCOUNTER — DOCUMENTATION (OUTPATIENT)
Dept: VASCULAR SURGERY | Facility: CLINIC | Age: 69
End: 2024-11-12

## 2024-11-12 LAB
ANION GAP SERPL CALCULATED.3IONS-SCNC: 6 MMOL/L (ref 4–13)
BUN SERPL-MCNC: 12 MG/DL (ref 5–25)
CALCIUM SERPL-MCNC: 8.6 MG/DL (ref 8.4–10.2)
CHLORIDE SERPL-SCNC: 109 MMOL/L (ref 96–108)
CO2 SERPL-SCNC: 23 MMOL/L (ref 21–32)
CREAT SERPL-MCNC: 0.7 MG/DL (ref 0.6–1.3)
ERYTHROCYTE [DISTWIDTH] IN BLOOD BY AUTOMATED COUNT: 13.9 % (ref 11.6–15.1)
GFR SERPL CREATININE-BSD FRML MDRD: 94 ML/MIN/1.73SQ M
GLUCOSE SERPL-MCNC: 122 MG/DL (ref 65–140)
HCT VFR BLD AUTO: 35.7 % (ref 36.5–49.3)
HGB BLD-MCNC: 12.1 G/DL (ref 12–17)
MCH RBC QN AUTO: 32.8 PG (ref 26.8–34.3)
MCHC RBC AUTO-ENTMCNC: 33.9 G/DL (ref 31.4–37.4)
MCV RBC AUTO: 97 FL (ref 82–98)
PLATELET # BLD AUTO: 165 THOUSANDS/UL (ref 149–390)
PMV BLD AUTO: 10.3 FL (ref 8.9–12.7)
POTASSIUM SERPL-SCNC: 4.2 MMOL/L (ref 3.5–5.3)
RBC # BLD AUTO: 3.69 MILLION/UL (ref 3.88–5.62)
SODIUM SERPL-SCNC: 138 MMOL/L (ref 135–147)
WBC # BLD AUTO: 11 THOUSAND/UL (ref 4.31–10.16)

## 2024-11-12 PROCEDURE — 99024 POSTOP FOLLOW-UP VISIT: CPT | Performed by: SURGERY

## 2024-11-12 PROCEDURE — 97163 PT EVAL HIGH COMPLEX 45 MIN: CPT

## 2024-11-12 PROCEDURE — 80048 BASIC METABOLIC PNL TOTAL CA: CPT | Performed by: PHYSICIAN ASSISTANT

## 2024-11-12 PROCEDURE — 97167 OT EVAL HIGH COMPLEX 60 MIN: CPT

## 2024-11-12 PROCEDURE — 85027 COMPLETE CBC AUTOMATED: CPT | Performed by: PHYSICIAN ASSISTANT

## 2024-11-12 RX ORDER — POLYETHYLENE GLYCOL 3350 17 G/17G
17 POWDER, FOR SOLUTION ORAL DAILY PRN
Status: DISCONTINUED | OUTPATIENT
Start: 2024-11-12 | End: 2024-11-13

## 2024-11-12 RX ORDER — ATORVASTATIN CALCIUM 80 MG/1
80 TABLET, FILM COATED ORAL
Status: DISCONTINUED | OUTPATIENT
Start: 2024-11-12 | End: 2024-11-14 | Stop reason: HOSPADM

## 2024-11-12 RX ADMIN — HEPARIN SODIUM 5000 UNITS: 5000 INJECTION, SOLUTION INTRAVENOUS; SUBCUTANEOUS at 05:14

## 2024-11-12 RX ADMIN — CLOPIDOGREL BISULFATE 75 MG: 75 TABLET, FILM COATED ORAL at 09:06

## 2024-11-12 RX ADMIN — METOPROLOL SUCCINATE 25 MG: 25 TABLET, EXTENDED RELEASE ORAL at 20:11

## 2024-11-12 RX ADMIN — ACETAMINOPHEN 650 MG: 325 TABLET, FILM COATED ORAL at 09:34

## 2024-11-12 RX ADMIN — HEPARIN SODIUM 5000 UNITS: 5000 INJECTION, SOLUTION INTRAVENOUS; SUBCUTANEOUS at 13:22

## 2024-11-12 RX ADMIN — TAMSULOSIN HYDROCHLORIDE 0.4 MG: 0.4 CAPSULE ORAL at 09:06

## 2024-11-12 RX ADMIN — DOCUSATE SODIUM 100 MG: 100 CAPSULE, LIQUID FILLED ORAL at 16:10

## 2024-11-12 RX ADMIN — DOCUSATE SODIUM 100 MG: 100 CAPSULE, LIQUID FILLED ORAL at 09:06

## 2024-11-12 RX ADMIN — FOLIC ACID 1 MG: 1 TABLET ORAL at 09:06

## 2024-11-12 RX ADMIN — HEPARIN SODIUM 5000 UNITS: 5000 INJECTION, SOLUTION INTRAVENOUS; SUBCUTANEOUS at 20:11

## 2024-11-12 RX ADMIN — EZETIMIBE 10 MG: 10 TABLET ORAL at 09:06

## 2024-11-12 RX ADMIN — ACETAMINOPHEN 650 MG: 325 TABLET, FILM COATED ORAL at 16:09

## 2024-11-12 RX ADMIN — SODIUM CHLORIDE, SODIUM LACTATE, POTASSIUM CHLORIDE, AND CALCIUM CHLORIDE 125 ML/HR: .6; .31; .03; .02 INJECTION, SOLUTION INTRAVENOUS at 01:46

## 2024-11-12 NOTE — PLAN OF CARE
Problem: PHYSICAL THERAPY ADULT  Goal: Performs mobility at highest level of function for planned discharge setting.  See evaluation for individualized goals.  Description: Treatment/Interventions: ADL retraining, Functional transfer training, LE strengthening/ROM, Elevations, Therapeutic exercise, Endurance training, Patient/family training, Bed mobility, Gait training, Spoke to nursing, OT  Equipment Recommended: Walker       See flowsheet documentation for full assessment, interventions and recommendations.  Note: Prognosis: Good  Problem List: Decreased strength, Decreased endurance, Impaired balance, Decreased mobility, Pain  Assessment: PT completed evaluation of 69 y.o. male admitted to [facility] on 11/11/2024 with Popliteal aneurysm (HCC). Patient's current status instabilities include ongoing pain, continuous O2/HR monitoring, regression in function from baseline. Patient  has a past medical history of BPH (benign prostatic hyperplasia), CAD (coronary artery disease), Enlarged prostate, Hyperlipidemia, Hypertension, and Vitamin D deficiency. s/p left SFA to peroneal bypass. At baseline, pt resides in a one level motel alone on the 2nd floor and was independent ADLs prior to hospital admission. This patient is a step down patient and requires increased time for safe management of lines pre/post/during mobility. Vital signs monitored throughout treatment session.  Patient presents at time of PT evaluation functioning below baseline and currently w/ overall mobility deficits due to: impaired balance, decreased endurance, gait dysfunction, decreased activity tolerance and fall risk. During PT evaluation, patient currently is requiring supervision for bed mobility skills;  min assist x1 for functional transfers and  min assist x1 for ambulation with RW. Patient performed supine to sit to edge of bed requiring HOB elevated, increased time, and use of bed rails for trunk/core support. Patient  ambulated 20' with RW, requiring occasional verbal cues for RW management, improving WB on L LE, and improving stride/step length. Further ambulation limited at this time due to increase in LLE pain, fatigue, and generalized weakness. Pt left OOB in bedside chair with alarm and all needs met. This patient is functioning below their baseline and is recommended for level II. Patient will benefit from continued skilled PT this admission to achieve maximal function and safety. The patients AM-PAC Basic Mobility Inpatient Short From Raw Score is 14 . Based on AM-PAC scoring and patient presentation, PT currently recommending Level II (Moderate Resource Intensity). Please also refer to the recommendation of the Physical Therapist for safe discharge planning.  Barriers to Discharge: Inaccessible home environment, Decreased caregiver support     Rehab Resource Intensity Level, PT: II (Moderate Resource Intensity) (pending further progress)    See flowsheet documentation for full assessment.

## 2024-11-12 NOTE — ASSESSMENT & PLAN NOTE
Patient is 68 yo male with PMH COPD, CAD s/p angio w/ coronary stent, HLD, tobacco abuse, who was admitted post operatively s/p left SFA to peroneal bypass with 6mm ringed PTFE (poor quality autogenous vein) and popliteal artery aneurysm ligation (DOS: 11/11/24)    Plan:  - POD1, Dressings left intact  - Restart plavix POD1, eliquis POD2  - Multimodal pain control PRN  - Discontinue A-line  - Discontinue gonzalez  - Continue neurovascular checks, incision monitoring  - Consult for post operative PT/OT eval  - Please see attending attestation for final plan recommendations

## 2024-11-12 NOTE — RESTORATIVE TECHNICIAN NOTE
Restorative Technician Note      Patient Name: Jose Elias Mcgrath     Restorative Tech Visit Date: 11/12/24  Note Type: Mobility  Patient Position Upon Consult: Supine  Mobility / Activity Provided: assisted pt in standing; assisted pt in getting daily weight; pt declined sitting in bed side chair  Activity Performed: Stood  Assistive Device: Roller walker  Range of Motion: Left leg; Active  Patient Position at End of Consult: Supine; All needs within reach; Bed/Chair alarm activated

## 2024-11-12 NOTE — DISCHARGE INSTR - AVS FIRST PAGE
DISCHARGE INSTRUCTIONS  LEG/BYPASS SURGERY    ACTIVITY:   Limit your physical activity to walking for the first week and then increase your activity as tolerated.  If you become short of breath or tired, stop and rest.  You may require help with walking or feel more secure with something to lean on.  Walking up steps and normal activities may be resumed as you feel ready.  Most people tire easily for the first few weeks following leg surgery.  This improves as conditioning returns.  Avoid strenuous activity such as vigorous exercise.  Avoid heavy lifting (do not lift more than 15 pounds) for the first four weeks after surgery.  You should not drive a car for at least two weeks following discharge from the hospital and you are off all narcotic pain medication.  You may ride in a car.    DIET:  Resume your normal diet.  Good nutrition is important for healing of your incision.  If you are discharged on narcotics for pain control, continue taking your stool softeners until you are having regular bowel movements.    INCISION:  You should shower daily.  Wash incision daily with soap and water, but do not rub or scrub the incision; rinse thoroughly and pat dry.  You may have stitches or staples to close your incision and it is okay for these to get wet.  Do not bathe in a tub or swim for the first 4 week following surgery or if you have any open wounds.  It is normal to have swelling or discoloration around the incision.   If increasing redness or pain develops, call our office immediately.  Numbness in the region of the incision may occur following the surgery.  This normally improves over six to twelve months.    You may have surgical glue over your incisions. There are stitches present under the skin which will absorb on their own.   The glue is used to cover the access, assist in closure, and prevent contamination. This adhesive will darken and peel away on its own within one to two weeks. Do not pick at it.    If you  have a groin wound/incision, place a clean dry piece of gauze to cover your groin incision to keep incision clean and dry and prevent your skin from sticking together.  Change gauze daily.  You may have staples or stitches at your incisions.  These will be removed at your follow-up appointment or when they are ready to come out.  If you have a dressing over your surgical site, remove this on the second day after surgery.  If you have foot or leg wounds, please follow your podiatrist/wound care doctor's instructions for care.  If any of your incisions are open and require dressing changes, you will be given instructions for your daily incision care. If you are not able to change the dressings, a visiting nurse will be arranged.    DO NOT put any powders, creams, ointments, or lotions on your incision.    LEG SWELLING: Most patients have noticeable leg swelling after leg surgery. This usually improves within a few weeks. If swelling is present, elevate the leg whenever possible. Avoid sitting with the leg hanging down for prolonged periods of time.  Walking is beneficial.  An ACE bandage or support stocking may be helpful, but this should be discussed with your physician prior to use if you have a bypass.    FOLLOW UP STUDIES:  Doppler ultrasound studies are very important for long-term management.  Your surgeon will arrange this at your first postoperative visit.  Repeat studies are then scheduled every three months for the first year and periodically after this.    FOLLOW UP APPOINTMENTS:  Making and keeping follow up appointments and ultrasound tests are important to your recovery.  If you have difficulty making it to or keeping your follow up appointments, call the office.    If you have increased pain, fever >101.5, increased drainage, redness or a bad smell at your surgery site, new coldness/numbness of your arm or leg, please call us immediately and GO directly to the ER.    Appt w/ Dr Riggins: 11/26/2024 at  2:30pm, [place] office      PLEASE CALL THE OFFICE IF YOU HAVE ANY QUESTIONS  [phone]  [phone] [address], [place] [address]  [address] [facility], [place] [address]  [address], [place] [address]  [address] [facility], [place] [address]  [address] [facility], [address] [address]  [address] [facility], [address], [place] [address]  [address] [facility], [address], [address]nd Floor, [place] [address]  [address] [facility], 1st Floor, [address], [place] [address]  [address] [facility] [facility] [address], [place] [address]  [address] [facility], [address], [place] [address]     Void trial 1 week in rehab    Gonzalez Cath Care instructions---Maintain gonzalez catheter to straight drainage. May use leg bag and shower. May flush if needed using Joanna syringe and 60 ml NSS. Remove catheter on 7th day of rehab by 0600 hrs. Bladder scan if no void or less than 200ml in 6hrs. If bladder scan or straight cath is >350ml, repeat process.If patient requires straight cath x3 , re-insert gonzalez and call for Urology follow-up.  Gonzalez can remain in place for up to 4 weeks at a time.

## 2024-11-12 NOTE — CASE MANAGEMENT
Case Management Note    Patient name Jose Elias Mcgarth  Location [facility]-01 MRN [id number]  : 1955 Date 2024       Current Admission Date: 2024  Current Admission Diagnosis:Popliteal aneurysm (HCC)      LOS (days): 1  Geometric Mean LOS (GMLOS) (days): 1.8  Days to GMLOS:0.8     OBJECTIVE:  Risk of Unplanned Readmission Score: 10.61   Current admission status: Inpatient   Primary Insurance: MEDICARE  Secondary Insurance:  FOR LIFE    DISCHARGE DETAILS:  Discharge planning discussed with:: Patient  Freedom of Choice: Yes  Were Treatment Team discharge recommendations reviewed with patient/caregiver?: Yes  Did patient/caregiver verbalize understanding of patient care needs?: Yes  Were patient/caregiver advised of the risks associated with not following Treatment Team discharge recommendations?: Yes    Treatment Team Recommendation: Short Term Rehab    Additional Comments: Pt is recommended for short-term skilled rehab placement for his aftercare plan. CM spoke to pt about this aftercare recommendation. Pt is agreeable w/ this recommendation. CM provided pt w/ freedom of choice for SNF providers. Pt was agreeable to edel permission for CM to perform a provider search by sending a blanketed referral to all SNFs within the local region. CM made [facility]ID[facility] referrals to same. CM to follow.

## 2024-11-12 NOTE — PHYSICAL THERAPY NOTE
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]    Problem List  Patient Active Problem List   Diagnosis    Chronic obstructive pulmonary disease (COPD) (HCC)    CAD (coronary artery disease)    Benign prostatic hyperplasia with urinary obstruction    Hyperlipidemia    Status post primary angioplasty with coronary stent    Tobacco user    Cellulitis of right lower extremity    Dyshidrotic eczema    Onychomycosis    Peripheral arterial disease (HCC)    Popliteal aneurysm (HCC)       Past Medical History  Past Medical History:   Diagnosis Date    BPH (benign prostatic hyperplasia)     CAD (coronary artery disease)     Enlarged prostate     Hyperlipidemia     Hypertension     Vitamin D deficiency        Past Surgical History  Past Surgical History:   Procedure Laterality Date    COLONOSCOPY      CORONARY ANGIOPLASTY Left 02/26/2021    with heart cath    CORONARY STENT PLACEMENT      INGUINAL HERNIA REPAIR  11/29/2017    [place] BYPASS W/VEIN FEMORAL-POPLITEAL Left 11/11/2024    Procedure: LEFT FEMORAL DISTAL BYPASS WITH PTFE GRAFT; LIGATION OF DISTAL ANEURYSM;  Surgeon: Elias Riggins MD;  Location: [facility];  Service: Vascular    TONSILECTOMY AND ADNOIDECTOMY          11/12/24 1148   PT Last Visit   PT Visit Date 11/12/24   Note Type   Note type Evaluation   Additional Comments 69 y.o. male admitted to [facility] on 11/11/2024 with Popliteal aneurysm (HCC).   End of Consult   Patient Position at End of Consult Bedside chair;All needs within reach;Bed/Chair alarm activated   Pain Assessment   Pain Assessment Tool 0-10   Pain Score 2   Pain Location/Orientation Orientation: Left;Location: Leg;Location: Incision   Pain Onset/Description Onset: Ongoing   Effect of Pain on Daily Activities limits mobility   Patient's Stated Pain Goal No pain   Hospital Pain Intervention(s) Repositioned;Ambulation/increased activity;Emotional  "support;Rest   Restrictions/Precautions   Weight Bearing Precautions Per Order No   Other Precautions Chair Alarm;Bed Alarm;Fall Risk;Pain;Telemetry  (step down monitor)   Home Living   Type of Home Other (Comment)  (Motel)   Home Layout One level;Stairs to enter with rails  (full flight of stairs to 2nd floor room)   Bathroom Shower/Tub Tub/shower unit   Bathroom Toilet Standard   Bathroom Equipment Grab bars in shower   Bathroom Accessibility Accessible   Home Equipment Other (Comment)  (no DME)   Additional Comments At baseline, pt resides in a one level motel alone on the 2nd floor and was independent ADLs prior to hospital admission.   Prior Function   Level of [place] Independent with ADLs;Independent with functional mobility;Independent with IADLS   Lives With (S)  Alone   Receives Help From Other (Comment)  (patient reports no one is around to help)   IADLs Independent with driving;Independent with meal prep;Independent with medication management   Falls in the last 6 months 1 to 4  (reports 1 fall getting out of the shower)   Vocational Retired   General   Additional Pertinent History Patient  has a past medical history of BPH (benign prostatic hyperplasia), CAD (coronary artery disease), Enlarged prostate, Hyperlipidemia, Hypertension, and Vitamin D deficiency. s/p left SFA to peroneal bypass   Family/Caregiver Present No   Cognition   Overall Cognitive Status WFL   Arousal/Participation Alert   Orientation Level Oriented X4   Memory Within functional limits   Following Commands Follows one step commands without difficulty   Comments patient pleasant and cooperative, fair insight of functional deficits, limited due to pain   Subjective   Subjective \"I can't put much weight on this leg, it hurts a lot\"   RLE Assessment   RLE Assessment   (4-/5 grossly)   LLE Assessment   LLE Assessment   (3+/5 grossly)   Bed Mobility   Supine to Sit 5  Supervision   Additional items HOB elevated;Bedrails;Increased time " required;Verbal cues   Sit to Supine Unable to assess   Additional Comments patient left OOB in bedside chair with alarm and all needs met   Transfers   Sit to Stand 4  Minimal assistance   Additional items Assist x 1;Increased time required;Verbal cues   Stand to Sit 4  Minimal assistance   Additional items Assist x 1;Increased time required;Verbal cues;Armrests  (vc for hand placement)   Additional Comments rw   Ambulation/Elevation   Gait pattern Forward Flexion;Decreased foot clearance;Antalgic;Step to;Excessively slow;Decreased L stance;Decreased heel strike   Gait Assistance 4  Minimal assist   Additional items Assist x 1;Tactile cues;Verbal cues   Assistive Device Rolling walker   Distance 20'   Stair Management Assistance Not tested   Ambulation/Elevation Additional Comments Patient ambulated 20' with RW, requiring occasional verbal cues for RW management, improving WB on L LE, and improving stride/step length. Further ambulation limited at this time due to increase in LLE pain, fatigue, and generalized weakness.   Balance   Static Sitting Good   Dynamic Sitting Fair +   Static Standing Fair   Dynamic Standing Poor +   Ambulatory Poor +   Endurance Deficit   Endurance Deficit Yes   Endurance Deficit Description generalized weakness, pain, fatigue   Activity Tolerance   Activity Tolerance Patient limited by fatigue;Patient limited by pain   Medical Staff Made Aware OT [place]; This high complexity evaluation was performed with an occupational therapist due to the patient's co-morbidities, clinically unstable presentation, and present impairments which are a regression from the patient's baseline.   Nurse Made Aware RN cleared and updated   Assessment   Prognosis Good   Problem List Decreased strength;Decreased endurance;Impaired balance;Decreased mobility;Pain   Assessment PT completed evaluation of 69 y.o. male admitted to [facility] on 11/11/2024 with Popliteal aneurysm (HCC). Patient's current  status instabilities include ongoing pain, continuous O2/HR monitoring, regression in function from baseline. Patient  has a past medical history of BPH (benign prostatic hyperplasia), CAD (coronary artery disease), Enlarged prostate, Hyperlipidemia, Hypertension, and Vitamin D deficiency. s/p left SFA to peroneal bypass. At baseline, pt resides in a one level motel alone on the 2nd floor and was independent ADLs prior to hospital admission.       This patient is a step down patient and requires increased time for safe management of lines pre/post/during mobility. Vital signs monitored throughout treatment session.  Patient presents at time of PT evaluation functioning below baseline and currently w/ overall mobility deficits due to: impaired balance, decreased endurance, gait dysfunction, decreased activity tolerance and fall risk. During PT evaluation, patient currently is requiring supervision for bed mobility skills;  min assist x1 for functional transfers and  min assist x1 for ambulation with RW. Patient performed supine to sit to edge of bed requiring HOB elevated, increased time, and use of bed rails for trunk/core support. Patient ambulated 20' with RW, requiring occasional verbal cues for RW management, improving WB on L LE, and improving stride/step length. Further ambulation limited at this time due to increase in LLE pain, fatigue, and generalized weakness. Pt left OOB in bedside chair with alarm and all needs met.       This patient is functioning below their baseline and is recommended for level II. Patient will benefit from continued skilled PT this admission to achieve maximal function and safety. The patients AM-PAC Basic Mobility Inpatient Short From Raw Score is 14 . Based on AM-PAC scoring and patient presentation, PT currently recommending Level II (Moderate Resource Intensity). Please also refer to the recommendation of the Physical Therapist for safe discharge planning.   Barriers to  Discharge Inaccessible home environment;Decreased caregiver support   Goals   Patient Goals to decrease pain   STG Expiration Date 11/26/24   Short Term Goal #1 1) Perform bed mobility mod-I to participate in frequent repositioning and improve skin integrity; 2) Perform functional transfers mod-I to promote I with toileting and OOB mobility; 3) Ambulate 100 feet mod-I with least restrictive device to participate in household and community level mobility; 4) Improve b/l LE strength by 1/2 grade in order to improve efficiency of tranfers; 5) Improve balance by 1 grade to reduce risk for falls; 6) Improve overall activity tolerance to 60 minutes in order to increase patient's ability to engage in mobility tasks; 7) Navigate 12 steps S level in order to safely navigate multiple floors at home   PT Treatment Day 0   Plan   Treatment/Interventions ADL retraining;Functional transfer training;LE strengthening/ROM;Elevations;Therapeutic exercise;Endurance training;Patient/family training;Bed mobility;Gait training;Spoke to nursing;OT   PT Frequency 3-5x/wk   Discharge Recommendation   Rehab Resource Intensity Level, PT II (Moderate Resource Intensity)  (pending further progress)   Equipment Recommended Walker   Walker Package Recommended Wheeled walker   Change/add to Walker Package? No   AM-PAC Basic Mobility Inpatient   Turning in Flat Bed Without Bedrails 3   Lying on Back to Sitting on Edge of Flat Bed Without Bedrails 3   Moving Bed to Chair 2   Standing Up From Chair Using Arms 2   Walk in Room 2   Climb 3-5 Stairs With Railing 2   Basic Mobility Inpatient Raw Score 14   Basic Mobility Standardized Score 35.55   [place] Highest Level Of Mobility   -[facility] Goal 4: Move to chair/commode   -HL Achieved 6: Walk 10 steps or more   End of Consult   Patient Position at End of Consult Bedside chair;Bed/Chair alarm activated;All needs within reach           Felicia Saldivar, PT, DPT

## 2024-11-12 NOTE — QUICK NOTE
"Post Op Check Note - Vascular Surgery  Jose Elias Mcgrath 69 y.o. male MRN: [id number]  Unit/Bed#: [facility] 531-01 Encounter: [id number]    ASSESSMENT:  Jose Elias Mcgrath is a 69 y.o. male who is status post L fem to peroneal bypass with PTFE.  Pain well controlled, small amount of bloody drainage on dressing.  Restart plavix tomorrow, Eliquis POD 2.     Subjective: Patient comfortable in bed, no complaints.    Physical Exam:  GEN: NAD  CV: RRR  Lung: Normal effort  Ab: Soft, NT/ND  Neuro: A+Ox3  Incisions: scant drainage on dressing  Pulses: L peroneal and PT signals, no AT signal    Blood pressure 123/68, pulse 59, temperature 97.8 °F (36.6 °C), resp. rate 18, height 5' 9\" (1.753 m), weight 81.6 kg (180 lb), SpO2 99%.,Body mass index is 26.58 kg/m².      Intake/Output Summary (Last 24 hours) at 11/12/2024 0503  Last data filed at 11/12/2024 0245  Gross per 24 hour   Intake 2872.92 ml   Output 3275 ml   Net -402.08 ml       Invasive Devices       Peripheral Intravenous Line  Duration             Peripheral IV 11/11/24 Dorsal (posterior);Left Hand <1 day    Peripheral IV 11/11/24 Right Arm <1 day              Arterial Line  Duration             Arterial Line 11/11/24 Left Radial <1 day              Drain  Duration             Urethral Catheter Latex 16 Fr. <1 day                    VTE Pharmacologic Prophylaxis: Heparin            "

## 2024-11-12 NOTE — PLAN OF CARE
Problem: OCCUPATIONAL THERAPY ADULT  Goal: Performs self-care activities at highest level of function for planned discharge setting.  See evaluation for individualized goals.  Description: Treatment Interventions: ADL retraining, Functional transfer training, UE strengthening/ROM, Endurance training, Patient/family training, Cognitive reorientation, Equipment evaluation/education, Fine motor coordination activities, Compensatory technique education, Continued evaluation, Energy conservation, Activityengagement          See flowsheet documentation for full assessment, interventions and recommendations.   Note: Limitation: Decreased ADL status, Decreased endurance, Decreased self-care trans, Decreased high-level ADLs  Prognosis: Fair  Assessment: 69 year old pt seen today for an OT evaluation following admission to [facility] 2/2 popliteal aneurysm, s/p left SFA to peroneal bypass with 6mm ringed PTFE (poor quality autogenous vein) and popliteal artery aneurysm ligation with present symptoms significant for pain, fatigue, weakness, decreased ADL status, decreased functional mobility. Pt  has a past medical history of BPH (benign prostatic hyperplasia), CAD (coronary artery disease), Enlarged prostate, Hyperlipidemia, Hypertension, and Vitamin D deficiency. Pt reported living alone in a [employer]. 2nd floor Room, with FFOS to enter. Pt reports being IND with ADLs/IADLs PTA with no DME used. +. Limited social support/assistance. Pt reports that it might be possible for him to get a room on the first floor but that he doesn't know who would help him move all of his stuff. Pt pleasant and cooperative t/o session. Pt completed functional bed mobility with SUP. Min A for functional STS txfs and functional mobility with RW. Pt was Min A for UB ADLs and for LB ADLs. The patient's raw score on the AM-PAC Daily Activity Inpatient Short Form is 18. A raw score of less than 19 suggests the patient  may benefit from discharge to post-acute rehabilitation services. Please refer to the recommendation of the Occupational Therapist for safe discharge planning. Pt is functioning below baseline level of function and will continue to benefit from skilled acute OT to promote increased independence and return to PLOF. At this time, current OT recommendation is Level 2 resources - pending progress. Will continue to follow and assess.     Rehab Resource Intensity Level, OT: II (Moderate Resource Intensity) (pending progress)

## 2024-11-12 NOTE — PROGRESS NOTES
Vascular Nurse Navigator Post Op Education    Met with patient at bedside to introduce myself as Vascular Nurse Navigator and explained my role.  Patient is appropriate and accepting to education. Patient was educated with Review of written materials provided, Teachback, Explanation, Demonstration, and Question & Answer on expectations of post op care and recovery on left SFA to peroneal bypass with 6mm ringed PTFE. Patient is a smoker  (1 ppd x 40 yrs), as such Smoking effects on the lungs, tobacco triggers, and Smoking cessation was reviewed. Education provided to patient on infection prevention, activity limitations, when to call the office, importance of follow up, and incisional care.  Discharge instruction handout provided to patient to review.        Tobacco use is a significant patient-modifiable risk factor for this patient’s vascular disease with multiple vascular comorbidities, and a significant risk factor for failure of and complications from any endovascular or surgical interventions.    I explained to the patient the effects of smoking including peripheral artery disease, coronary artery disease, cerebrovascular disease as well as cancer and chronic obstructive pulmonary disease. I asked the patient to stop smoking immediately. It is never too late to quit, and many studies show significant health benefits as well as economical savings after smoking cessation. I offered to the patient nicotine replacement therapy as well as referral to the smoking cessation program and access to the quit line 4-992-PAKEKDN or ambulatory referral to our network smoking cessation program.    Based on our conversation, this patient expresses a passive interest in quitting but does not appear very motivated to quit  And plans to use nicotine replacement to help quit    The patient did not set a quit date. I informed Anat Fernando PA-C, via Epic Secure Chat, patient is interested in nicotine replacement therapy.      I  spent approximately 5 minutes on tobacco cessation counseling with this patient.

## 2024-11-12 NOTE — PROGRESS NOTES
Progress Note - Vascular Surgery   Name: Jose Elias Mcgrath 69 y.o. male I MRN: [id number]  Unit/Bed#: [facility] 531-01 I Date of Admission: 11/11/2024   Date of Service: 11/12/2024 I Hospital Day: 1     Assessment & Plan  Popliteal aneurysm (HCC)  Patient is 68 yo male with PMH COPD, CAD s/p angio w/ coronary stent, HLD, tobacco abuse, who was admitted post operatively s/p left SFA to peroneal bypass with 6mm ringed PTFE (poor quality autogenous vein) and popliteal artery aneurysm ligation (DOS: 11/11/24)    Plan:  - POD1, Dressings left intact  - Restart plavix POD1, eliquis POD2  - Multimodal pain control PRN  - Discontinue A-line  - Discontinue gonzalez  - Continue neurovascular checks, incision monitoring  - Consult for post operative PT/OT eval  - Please see attending attestation for final plan recommendations    24 Hour Events : None  Subjective : Patient states he is doing well today. He denies any numbness/tingling/weakness in left lower extremity. He states the incision areas are sore but denies N/V/SOB/CP/F/C.    Objective :  Temp:  [96.3 °F (35.7 °C)-98.5 °F (36.9 °C)] 97.8 °F (36.6 °C)  HR:  [55-86] 55  BP: (113-168)/(58-90) 114/71  Resp:  [12-20] 18  SpO2:  [95 %-99 %] 99 %  O2 Device: None (Room air)  Nasal Cannula O2 Flow Rate (L/min):  [2 L/min-3 L/min] 2 L/min     I/O         11/10 0701  11/11 0700 11/11 0701  11/12 0700 11/12 0701  11/13 0700    P.O.  600     I.V. (mL/kg)  2272.9 (27.9)     Total Intake(mL/kg)  2872.9 (35.2)     Urine (mL/kg/hr)  4000     Total Output  4000     Net  -1127.1                    Physical Exam  Vitals reviewed.   HENT:      Head: Normocephalic.   Cardiovascular:      Rate and Rhythm: Normal rate and regular rhythm.      Pulses:           Dorsalis pedis pulses are detected w/ Doppler on the left side.        Posterior tibial pulses are detected w/ Doppler on the left side.      Comments:   Left PT signal  Left peroneal signal  Left DP signal weak  Pulmonary:      Effort:  "Pulmonary effort is normal.   Abdominal:      General: Abdomen is flat.      Tenderness: There is no abdominal tenderness.   Musculoskeletal:      Comments: Incisions to medial left thigh and medial lower leg with intact mepilex. Mild bloody drainage to lower leg dressing   Skin:     General: Skin is warm.   Neurological:      General: No focal deficit present.      Mental Status: He is alert and oriented to person, place, and time.               Lab Results: I have reviewed the following results:  CBC with diff:   Lab Results   Component Value Date    WBC 11.00 (H) 11/12/2024    HGB 12.1 11/12/2024    HCT 35.7 (L) 11/12/2024    MCV 97 11/12/2024     11/12/2024    RBC 3.69 (L) 11/12/2024    MCH 32.8 11/12/2024    MCHC 33.9 11/12/2024    RDW 13.9 11/12/2024    MPV 10.3 11/12/2024    NRBC 0 09/24/2024   ,   BMP/CMP:  Lab Results   Component Value Date    SODIUM 138 11/12/2024    SODIUM 139 10/17/2024    K 4.2 11/12/2024    K 4.3 10/17/2024     (H) 11/12/2024     10/17/2024    CO2 23 11/12/2024    CO2 25 10/17/2024    BUN 12 11/12/2024    BUN 10 10/17/2024    CREATININE 0.70 11/12/2024    CREATININE 0.9 10/17/2024    CALCIUM 8.6 11/12/2024    CALCIUM 9.3 10/17/2024    AST 15 09/25/2024    AST 17 09/11/2024    ALT 13 09/25/2024    ALT 16 09/11/2024    ALKPHOS 52 09/25/2024    ALKPHOS 72 09/11/2024    EGFR 94 11/12/2024    EGFR >90 10/17/2024    EGFR >60.0 10/06/2020   ,   Lipid Panel: No results found for: \"CHOL\",   Coags:   Lab Results   Component Value Date    PTT 69 (H) 09/30/2024    INR 0.99 09/26/2024   ,   Blood Culture:   Lab Results   Component Value Date    BLOODCX No Growth After 5 Days. 09/24/2024   ,   Urinalysis: No results found for: \"COLORU\", \"CLARITYU\", \"SPECGRAV\", \"PHUR\", \"LEUKOCYTESUR\", \"NITRITE\", \"PROTEINUA\", \"GLUCOSEU\", \"KETONESU\", \"BILIRUBINUR\", \"BLOODU\",   Urine Culture: No results found for: \"URINECX\",   Wound Culure:   Lab Results   Component Value Date    WOUNDCULT 4+ Growth " of Myroides odoratus (A) 09/24/2024    WOUNDCULT 1+ Growth of Enterobacter cloacae (A) 09/24/2024    WOUNDCULT 2+ Growth of Stenotrophomonas maltophilia (A) 09/24/2024    WOUNDCULT (A) 09/24/2024     2+ Growth of - Presumptive Acinetobacter courvalinii Acinetobacter species    WOUNDCULT 4+ Growth of Corynebacterium amycolatum (A) 09/24/2024       Imaging Results Review: No pertinent imaging studies reviewed.  Other Study Results Review: No additional pertinent studies reviewed.    VTE Prophylaxis: Heparin

## 2024-11-12 NOTE — CASE MANAGEMENT
Case Management Assessment    Patient name Jose Elias Mcgrath  Location [facility] 531-01 MRN [id number]  : 1955 Date 2024       Current Admission Date: 2024  Current Admission Diagnosis:Popliteal aneurysm (HCC)   Patient Active Problem List    Diagnosis Date Noted Date Diagnosed    Popliteal aneurysm (HCC) 2024     Peripheral arterial disease (HCC) 2024     Chronic obstructive pulmonary disease (COPD) (HCC) 2024     Tobacco user 2024     Cellulitis of right lower extremity 2024     Dyshidrotic eczema 2024     Onychomycosis 2024     CAD (coronary artery disease) 03/10/2021     Status post primary angioplasty with coronary stent 03/10/2021     Benign prostatic hyperplasia with urinary obstruction 01/10/2014     Hyperlipidemia 01/10/2014       LOS (days): 1  Geometric Mean LOS (GMLOS) (days): 1.8  Days to GMLOS:0.8     OBJECTIVE:  Risk of Unplanned Readmission Score: 10.61     Current admission status: Inpatient    Preferred Pharmacy:   [id number] [address] [address]  Phone: [phone] Fax: [phone]    Primary Care Provider: Ila Braswell MD    Primary Insurance: MEDICARE  Secondary Insurance:  FOR LIFE    ASSESSMENT:  Active Health Care Proxies    There are no active Health Care Proxies on file.     Advance Directives  Does patient have a Health Care POA?: No  Does patient have Advance Directives?: No    Patient Information  Admitted from:: Home  Mental Status: Alert  Assessment information provided by:: Patient  Primary Caregiver: Self  Support Systems: Self  County of Residence: [facility]  What city do you live in[place]  Home entry access options. Select all that apply.: Stairs  Number of steps to enter home.: One Flight  Do the steps have railings?: Yes  Type of Current Residence: Other (Comment) (currently staying at a motel in a 2nd floor  room)  Floor Level: 2  Upon entering residence, is there a bedroom on the main floor (no further steps)?: Yes  Upon entering residence, is there a bathroom on the main floor (no further steps)?: Yes  Living Arrangements: Lives Alone  Is patient a ?: Yes ([employer])    Activities of Daily Living Prior to Admission  Functional Status: Independent  Completes ADLs independently?: Yes  Ambulates independently?: Yes  Does patient use assisted devices?: No  Does patient currently own DME?: No  Does the patient have a history of Short-Term Rehab?: No  Does patient have a history of HHC?: No    Patient Information Continued  Income Source: Pension/assisted  Does patient have prescription coverage?: Yes  Does patient receive dialysis treatments?: No  Does patient have a history of substance abuse?: No  Does patient have a history of Mental Health Diagnosis?: No    Means of Transportation  Means of Transport to Appts:: Drives Self    Social Determinants of Health (SDOH)      Flowsheet Row Most Recent Value   Housing Stability    In the last 12 months, was there a time when you were not able to pay the mortgage or rent on time? N   In the past 12 months, how many times have you moved where you were living? 1   At any time in the past 12 months, were you homeless or living in a shelter (including now)? N   Transportation Needs    In the past 12 months, has lack of transportation kept you from medical appointments or from getting medications? no   In the past 12 months, has lack of transportation kept you from meetings, work, or from getting things needed for daily living? No   Food Insecurity    Within the past 12 months, you worried that your food would run out before you got the money to buy more. Never true   Within the past 12 months, the food you bought just didn't last and you didn't have money to get more. Never true   Utilities    In the past 12 months has the electric, gas, oil, or water company threatened  to shut off services in your home? No     Additional Comments: CM reviewed d/c planning process including the following: identifying help at home, patient preference for d/c planning needs, availability of treatment team to discuss questions or concerns patient and/or family may have regarding understanding medications and recognizing signs and symptoms once discharged. CM also encouraged patient to follow up with all recommended appointments after discharge. Patient advised of importance for patient and family to participate in managing patient’s medical well being. Patient/caregiver received discharge checklist. Content reviewed. Patient/caregiver encouraged to participate in discharge plan of care prior to discharge home.

## 2024-11-12 NOTE — OCCUPATIONAL THERAPY NOTE
Occupational Therapy Evaluation     Patient Name: Jose Elias Mcgrath  Today's Date: 11/12/2024  Problem List  Principal Problem:    Popliteal aneurysm (HCC)    Past Medical History  Past Medical History:   Diagnosis Date    BPH (benign prostatic hyperplasia)     CAD (coronary artery disease)     Enlarged prostate     Hyperlipidemia     Hypertension     Vitamin D deficiency      Past Surgical History  Past Surgical History:   Procedure Laterality Date    COLONOSCOPY      CORONARY ANGIOPLASTY Left 02/26/2021    with heart cath    CORONARY STENT PLACEMENT      INGUINAL HERNIA REPAIR  11/29/2017    AR BYPASS W/VEIN FEMORAL-POPLITEAL Left 11/11/2024    Procedure: LEFT FEMORAL DISTAL BYPASS WITH PTFE GRAFT; LIGATION OF DISTAL ANEURYSM;  Surgeon: Elias Riggins MD;  [facility]: [facility];  Service: Vascular    TONSILECTOMY AND ADNOIDECTOMY               11/12/24 1147   OT Last Visit   OT Visit Date 11/12/24   Note Type   Note type Evaluation   Pain Assessment   Pain Assessment Tool 0-10   Pain Score 2   Pain Location/Orientation Orientation: Left;Orientation: Lower;Location: Leg   Pain Onset/Description Onset: Ongoing   Effect of Pain on Daily Activities increased time for functional activities   Patient's Stated Pain Goal No pain   Hospital Pain Intervention(s) Repositioned;Ambulation/increased activity;Emotional support   Restrictions/Precautions   Weight Bearing Precautions Per Order No   Braces or Orthoses   (none)   Other Precautions Chair Alarm;Bed Alarm;Multiple lines;Fall Risk;Pain   Home Living   Type of Home Other (Comment)  ([facility])   Home Layout One level;Performs ADLs on one level;Able to live on main level with bedroom/bathroom;Stairs to enter with rails  (FFOS to enter 2nd floor room)   Bathroom Shower/Tub Tub/shower unit   Bathroom Toilet Standard   Bathroom Equipment Grab bars in shower;Commode   Home Equipment Other (Comment)  (no DME)   Additional Comments Pt lives at [facility]  "Motel in a 2nd floor Room with FFOS to enter. No DME used.   Prior Function   Level of [place] Independent with ADLs;Independent with functional mobility;Independent with IADLS   Lives With (S)  Alone   Receives Help From   (limited social support per pt)   IADLs Independent with driving;Independent with meal prep;Independent with medication management   Falls in the last 6 months 1 to 4   Vocational Retired   Lifestyle   Autonomy IND PTA with all ADLs/IADLs. No DME used. +.   Reciprocal Relationships Lives alone, limited social support   Service to Others Retired   Intrinsic Gratification Watching TV   Subjective   Subjective \"I guess that will be hard\"   ADL   Where Assessed Chair   Eating Assistance 5  Supervision/Setup   Grooming Assistance 5  Supervision/Setup   UB Bathing Assistance 4  Minimal Assistance   LB Bathing Assistance 4  Minimal Assistance   UB Dressing Assistance 4  Minimal Assistance   LB Dressing Assistance 4  Minimal Assistance   Toileting Assistance  4  Minimal Assistance   Functional Assistance 4  Minimal Assistance   Bed Mobility   Supine to Sit 5  Supervision   Additional items Increased time required;Verbal cues;HOB elevated;Bedrails   Sit to Supine Unable to assess   Additional Comments Pt OOB in recliner post session, all needs met, call bell within reach. +chair alarm on   Transfers   Sit to Stand 4  Minimal assistance   Additional items Assist x 1;Increased time required;Verbal cues   Stand to Sit 4  Minimal assistance   Additional items Assist x 1;Increased time required;Verbal cues   Additional Comments RW   Functional Mobility   Functional Mobility 4  Minimal assistance   Additional Comments Ax1, short household distances in room. RW   Additional items Rolling walker   Balance   Static Sitting Good   Dynamic Sitting Fair +   Static Standing Fair   Dynamic Standing Poor +   Ambulatory Poor +   Activity Tolerance   Activity Tolerance Patient limited by fatigue;Patient limited " by pain   Medical Staff Made Aware PT jacqui DuarteDanieldougie 2/2 increased medical complexity and comorbidities   Nurse Made Aware RN cleared for therapy   RUE Assessment   RUE Assessment WFL   LUE Assessment   LUE Assessment WFL   Hand Function   Gross Motor Coordination Functional   Fine Motor Coordination Functional   Cognition   Overall Cognitive Status WFL   Arousal/Participation Alert;Cooperative   Attention Attends with cues to redirect   Orientation Level Oriented X4   Memory Decreased recall of precautions   Following Commands Follows one step commands without difficulty   Comments Pt pleasant and cooperative. Fair insight to deficits. Pt requiring VCs for attention, VCs for safety awareness. Overall limited 2/2 pain   Assessment   Limitation Decreased ADL status;Decreased endurance;Decreased self-care trans;Decreased high-level ADLs   Prognosis Fair   Assessment 69 year old pt seen today for an OT evaluation following admission to [facility] 2/2 popliteal aneurysm, s/p left SFA to peroneal bypass with 6mm ringed PTFE (poor quality autogenous vein) and popliteal artery aneurysm ligation with present symptoms significant for pain, fatigue, weakness, decreased ADL status, decreased functional mobility. Pt  has a past medical history of BPH (benign prostatic hyperplasia), CAD (coronary artery disease), Enlarged prostate, Hyperlipidemia, Hypertension, and Vitamin D deficiency. Pt reported living alone in a [employer] [facility]el. 2nd floor Room, with FFOS to enter. Pt reports being IND with ADLs/IADLs PTA with no DME used. +. Limited social support/assistance. Pt reports that it might be possible for him to get a room on the first floor but that he doesn't know who would help him move all of his stuff. Pt pleasant and cooperative t/o session. Pt completed functional bed mobility with SUP. Min A for functional STS txfs and functional mobility with RW. Pt was Min A for UB ADLs and for LB ADLs. The  patient's raw score on the AM-PAC Daily Activity Inpatient Short Form is 18. A raw score of less than 19 suggests the patient may benefit from discharge to post-acute rehabilitation services. Please refer to the recommendation of the Occupational Therapist for safe discharge planning. Pt is functioning below baseline level of function and will continue to benefit from skilled acute OT to promote increased independence and return to PLOF. At this time, current OT recommendation is Level 2 resources - pending progress. Will continue to follow and assess.   Goals   Patient Goals to have less pain   LTG Time Frame 10-14   Long Term Goal #1 See below for goals   Plan   Treatment Interventions ADL retraining;Functional transfer training;UE strengthening/ROM;Endurance training;Patient/family training;Cognitive reorientation;Equipment evaluation/education;Fine motor coordination activities;Compensatory technique education;Continued evaluation;Energy conservation;Activityengagement   Goal Expiration Date 11/26/24   OT Frequency 2-3x/wk   Discharge Recommendation   Rehab Resource Intensity Level, OT II (Moderate Resource Intensity)  (pending progress)   AM-PAC Daily Activity Inpatient   Lower Body Dressing 2   Bathing 3   Toileting 3   Upper Body Dressing 3   Grooming 3   Eating 4   Daily Activity Raw Score 18   Daily Activity Standardized Score (Calc for Raw Score >=11) 38.66   AM-PAC Applied Cognition Inpatient   Following a Speech/Presentation 3   Understanding Ordinary Conversation 4   Taking Medications 4   Remembering Where Things Are Placed or Put Away 4   Remembering List of 4-5 Errands 4   Taking Care of Complicated Tasks 3   Applied Cognition Raw Score 22   Applied Cognition Standardized Score 47.83   End of Consult   Education Provided Yes   Patient Position at End of Consult Bedside chair;Bed/Chair alarm activated;All needs within reach   Nurse Communication Nurse aware of consult         Occupational Therapy  Goals    Pt will be Mod I with bed mobility with good sitting balance/tolerance to engage in self care tasks within this plan of care.      Pt will be Mod I for UB dressing and bathing with use of assistive devices PRN within this plan of care.     Pt will be Mod I for LB dressing and bathing with use of assistive devices PRN within this plan of care.     Pt will demonstrate standing tolerance of 2-3 minutes increase independence for toileting ADLs/tasks with use of assistive devices PRN within this plan of care.     Pt will complete functional transfers with mod I, with use of appropriate assistive devices within this plan of care.     Pt will demonstrate good carryover of safety awareness with use of appropriate assistive devices during functional tasks within this plan of care.     Pt will demonstrated increased activity tolerance to 30 mins for participation in ADLs and functional tasks within this plan of care.     Pt will complete further functional cognitive assessment with good attention/participation to assist with safe d/c planning recommendations.         Armani Beltran MS, OTR/L     [facility] CERTIFIED RATER ID [id number]

## 2024-11-13 ENCOUNTER — TELEPHONE (OUTPATIENT)
Dept: OTHER | Facility: HOSPITAL | Age: 69
End: 2024-11-13

## 2024-11-13 LAB
ANION GAP SERPL CALCULATED.3IONS-SCNC: 7 MMOL/L (ref 4–13)
BUN SERPL-MCNC: 14 MG/DL (ref 5–25)
CALCIUM SERPL-MCNC: 8.2 MG/DL (ref 8.4–10.2)
CHLORIDE SERPL-SCNC: 104 MMOL/L (ref 96–108)
CO2 SERPL-SCNC: 24 MMOL/L (ref 21–32)
CREAT SERPL-MCNC: 0.83 MG/DL (ref 0.6–1.3)
ERYTHROCYTE [DISTWIDTH] IN BLOOD BY AUTOMATED COUNT: 14.3 % (ref 11.6–15.1)
GFR SERPL CREATININE-BSD FRML MDRD: 88 ML/MIN/1.73SQ M
GLUCOSE SERPL-MCNC: 111 MG/DL (ref 65–140)
HCT VFR BLD AUTO: 37 % (ref 36.5–49.3)
HGB BLD-MCNC: 12.2 G/DL (ref 12–17)
MCH RBC QN AUTO: 31.6 PG (ref 26.8–34.3)
MCHC RBC AUTO-ENTMCNC: 33 G/DL (ref 31.4–37.4)
MCV RBC AUTO: 96 FL (ref 82–98)
PLATELET # BLD AUTO: 169 THOUSANDS/UL (ref 149–390)
PMV BLD AUTO: 10.6 FL (ref 8.9–12.7)
POTASSIUM SERPL-SCNC: 3.8 MMOL/L (ref 3.5–5.3)
RBC # BLD AUTO: 3.86 MILLION/UL (ref 3.88–5.62)
SODIUM SERPL-SCNC: 135 MMOL/L (ref 135–147)
WBC # BLD AUTO: 10.02 THOUSAND/UL (ref 4.31–10.16)

## 2024-11-13 PROCEDURE — 85027 COMPLETE CBC AUTOMATED: CPT | Performed by: PHYSICIAN ASSISTANT

## 2024-11-13 PROCEDURE — 99024 POSTOP FOLLOW-UP VISIT: CPT | Performed by: SURGERY

## 2024-11-13 PROCEDURE — 80048 BASIC METABOLIC PNL TOTAL CA: CPT | Performed by: PHYSICIAN ASSISTANT

## 2024-11-13 PROCEDURE — 99222 1ST HOSP IP/OBS MODERATE 55: CPT | Performed by: UROLOGY

## 2024-11-13 RX ORDER — ACETAMINOPHEN 325 MG/1
975 TABLET ORAL EVERY 8 HOURS SCHEDULED
Status: DISCONTINUED | OUTPATIENT
Start: 2024-11-13 | End: 2024-11-14 | Stop reason: HOSPADM

## 2024-11-13 RX ORDER — POLYETHYLENE GLYCOL 3350 17 G/17G
17 POWDER, FOR SOLUTION ORAL DAILY
Status: DISCONTINUED | OUTPATIENT
Start: 2024-11-14 | End: 2024-11-14 | Stop reason: HOSPADM

## 2024-11-13 RX ORDER — FUROSEMIDE 20 MG/1
20 TABLET ORAL DAILY
Status: DISCONTINUED | OUTPATIENT
Start: 2024-11-13 | End: 2024-11-14 | Stop reason: HOSPADM

## 2024-11-13 RX ORDER — LOSARTAN POTASSIUM 25 MG/1
25 TABLET ORAL DAILY
Status: DISCONTINUED | OUTPATIENT
Start: 2024-11-13 | End: 2024-11-14 | Stop reason: HOSPADM

## 2024-11-13 RX ORDER — NICOTINE 21 MG/24HR
14 PATCH, TRANSDERMAL 24 HOURS TRANSDERMAL DAILY
Status: DISCONTINUED | OUTPATIENT
Start: 2024-11-13 | End: 2024-11-14 | Stop reason: HOSPADM

## 2024-11-13 RX ADMIN — LOSARTAN POTASSIUM 25 MG: 25 TABLET, FILM COATED ORAL at 10:35

## 2024-11-13 RX ADMIN — FOLIC ACID 1 MG: 1 TABLET ORAL at 09:07

## 2024-11-13 RX ADMIN — HEPARIN SODIUM 5000 UNITS: 5000 INJECTION, SOLUTION INTRAVENOUS; SUBCUTANEOUS at 05:14

## 2024-11-13 RX ADMIN — DOCUSATE SODIUM 100 MG: 100 CAPSULE, LIQUID FILLED ORAL at 17:02

## 2024-11-13 RX ADMIN — FUROSEMIDE 20 MG: 20 TABLET ORAL at 10:35

## 2024-11-13 RX ADMIN — DOCUSATE SODIUM 100 MG: 100 CAPSULE, LIQUID FILLED ORAL at 09:09

## 2024-11-13 RX ADMIN — APIXABAN 5 MG: 5 TABLET, FILM COATED ORAL at 09:07

## 2024-11-13 RX ADMIN — ACETAMINOPHEN 975 MG: 325 TABLET, FILM COATED ORAL at 13:21

## 2024-11-13 RX ADMIN — NICOTINE 14 MG: 14 PATCH, EXTENDED RELEASE TRANSDERMAL at 10:35

## 2024-11-13 RX ADMIN — CLOPIDOGREL BISULFATE 75 MG: 75 TABLET, FILM COATED ORAL at 09:08

## 2024-11-13 RX ADMIN — TAMSULOSIN HYDROCHLORIDE 0.4 MG: 0.4 CAPSULE ORAL at 09:07

## 2024-11-13 RX ADMIN — ACETAMINOPHEN 975 MG: 325 TABLET, FILM COATED ORAL at 21:37

## 2024-11-13 RX ADMIN — METOPROLOL SUCCINATE 25 MG: 25 TABLET, EXTENDED RELEASE ORAL at 21:37

## 2024-11-13 RX ADMIN — ACETAMINOPHEN 975 MG: 325 TABLET, FILM COATED ORAL at 06:18

## 2024-11-13 RX ADMIN — EZETIMIBE 10 MG: 10 TABLET ORAL at 09:08

## 2024-11-13 RX ADMIN — APIXABAN 5 MG: 5 TABLET, FILM COATED ORAL at 17:02

## 2024-11-13 NOTE — LETTER
Nurse in care of Jose Elias Mcgrath at [facility],  As per chart review, pt is to have a void trial in one week. After speaking in length with pt, he prefers this procedure to be done on 11/18/24 to allow more time for his bladder to recover. Pt had a longer than expected surgery and went into urinary retention.  Please follow facility protocol for void trial. If no void after 8-10 hours, replace gonzalez and call Urology department in [place] at [phone] to arrange an appt with any Urologist the last week of November or beginning of December to repeat a void trial.  Please call us after his void trial next week to report if he passed or failed.  If pt passes, he does not need an appt with Urology for 2-3 months, unless he requests.

## 2024-11-13 NOTE — ASSESSMENT & PLAN NOTE
Patient is 70 yo male with PMH COPD, CAD s/p angio w/ coronary stent, HLD, tobacco abuse, who was admitted post operatively s/p left SFA to peroneal bypass with 6mm ringed PTFE (poor quality autogenous vein) and popliteal artery aneurysm ligation (DOS: 11/11/24)    Plan:  - POD2 dressing change, incisions stable, clean, well coapted  - Continue plavix, start eliquis POD2, discontinue subq heparin  - Multimodal pain control PRN  - Restarted gonzalez given requirement of multiple straight catheters  - Continue neurovascular checks, incision monitoring  - Per PT/OT: moderate resource intensity at discharge  - Per CM: pending SNF referrals for placement  - Please see attending attestation for final plan recommendations

## 2024-11-13 NOTE — RESTORATIVE TECHNICIAN NOTE
Restorative Technician Note      Patient Name: Jose Elias Malinkeyanaanca     Restorative Tech Visit Date: 11/13/24  Note Type: Mobility  Patient Position Upon Consult: Supine  Activity Performed: Ambulated  Assistive Device: Roller walker  Patient Position at End of Consult: Bedside chair; All needs within reach; Bed/Chair alarm activated

## 2024-11-13 NOTE — TELEPHONE ENCOUNTER
Patient seen in consultation for acute urinary retention status post vascular surgery.  Le catheter replaced for over 1 L of urine.  Void trial 1 week if not performed in rehab.  If patient has successful void trial in rehab should have urology provider follow-up in the next few months

## 2024-11-13 NOTE — ASSESSMENT & PLAN NOTE
Failed urinary retention protocol with Le catheter replacement for 1100 mL  Avoid medications that can cause urinary retention such as narcotic pain medications, antihistamines, anticholinergics  Continue Flomax 0.4 mg daily  Daily bowel regimen to prevent/alleviate constipation: Colace and MiraLAX  Nursing to teach catheter care at home  Discharge with leg bag during the day with large bag for nighttime  Can manually irrigate Le catheter as needed  Void trial 1 week as outpatient either in the office or short-term rehab

## 2024-11-13 NOTE — CONSULTS
Consultation - Urology   Name: Jose Elias Mcgrath 69 y.o. male I MRN: [id number]  Unit/Bed#: [facility] 531-01 I Date of Admission: 11/11/2024   Date of Service: 11/13/2024 I Hospital Day: 2   Inpatient consult to Urology  Consult performed by: MELQUIADES Castillo  Consult ordered by: Anat Quiles PA-C        Physician Requesting Evaluation: Elias Riggins MD   Reason for Evaluation / Principal Problem: Urinary retention    Assessment & Plan  Popliteal aneurysm (HCC)    Benign prostatic hyperplasia with urinary obstruction  Failed urinary retention protocol with Le catheter replacement for 1100 mL  Avoid medications that can cause urinary retention such as narcotic pain medications, antihistamines, anticholinergics  Continue Flomax 0.4 mg daily  Daily bowel regimen to prevent/alleviate constipation: Colace and MiraLAX  Nursing to teach catheter care at home  Discharge with leg bag during the day with large bag for nighttime  Can manually irrigate Le catheter as needed  Void trial 1 week as outpatient either in the office or short-term rehab  Urology service signing off.  Please contact the SecureChat role for the Urology service with any questions/concerns.    History of Present Illness   Jose Elias Mcgrath is a 69 y.o. male who presents with bilateral popliteal aneurysms.  He is status post left SFA to peroneal bypass and popliteal artery aneurysm ligation 11/11/2024.  Patient had Le catheter for procedure but was discontinued 11/12.  Patient was unable to urinate and Le catheter was replaced for 1125 mL.  Patient was previously seen by urology for BPH.  Patient is maintained with Flomax 0.4 mg daily.  Patient reports he did not have sensation to urinate since his surgery.  He has not had a bowel movement since 11/10.    Review of Systems   Constitutional:  Negative for activity change, appetite change, chills, fatigue, fever and unexpected weight change.   HENT:  Negative for facial  swelling.    Eyes:  Negative for discharge.   Respiratory: Negative.  Negative for cough and shortness of breath.    Cardiovascular:  Negative for chest pain and leg swelling.   Gastrointestinal:  Positive for constipation. Negative for abdominal distention, abdominal pain, diarrhea, nausea and vomiting.   Endocrine: Negative.    Genitourinary:  Positive for difficulty urinating. Negative for decreased urine volume, dysuria, enuresis, flank pain, frequency, genital sores, hematuria and urgency.   Musculoskeletal:  Positive for gait problem. Negative for back pain and myalgias.   Skin:  Negative for pallor and rash.   Allergic/Immunologic: Negative.  Negative for immunocompromised state.   Neurological:  Negative for facial asymmetry and speech difficulty.   Psychiatric/Behavioral:  Negative for agitation and confusion.      I have reviewed the patient's PMH, PSH, Social History, Family History, Meds, and Allergies    Objective :  Temp:  [98.1 °F (36.7 °C)-99.2 °F (37.3 °C)] 98.3 °F (36.8 °C)  HR:  [58-71] 70  BP: (116-134)/(69-79) 134/73  Resp:  [18] 18  SpO2:  [96 %-97 %] 96 %  O2 Device: None (Room air)    I/O         11/11 0701  11/12 0700 11/12 0701  11/13 0700 11/13 0701  11/14 0700    P.O. 600 1020 600    I.V. (mL/kg) 2272.9 (27.9)      Total Intake(mL/kg) 2872.9 (35.2) 1020 (13.1) 600 (7.7)    Urine (mL/kg/hr) 4000 5125 (2.7) 1450 (2.3)    Total Output 4000 5125 1450    Net -1127.1 -4105 -850                 Lines/Drains/Airways       Active Status       Name Placement date Placement time Site Days    Urethral Catheter Latex 16 Fr. 11/13/24  0533  Latex  less than 1                  Physical Exam  Vitals reviewed.   Constitutional:       General: He is not in acute distress.     Appearance: Normal appearance. He is not ill-appearing, toxic-appearing or diaphoretic.   HENT:      Head: Normocephalic and atraumatic.   Eyes:      General: No scleral icterus.  Cardiovascular:      Rate and Rhythm: Normal rate.  "  Pulmonary:      Effort: Pulmonary effort is normal. No respiratory distress.   Abdominal:      General: Abdomen is flat. There is no distension.      Palpations: Abdomen is soft.      Tenderness: There is no abdominal tenderness. There is no guarding or rebound.   Genitourinary:     Penis: Circumcised. No phimosis, paraphimosis, hypospadias, erythema, tenderness or discharge.       Testes:         Right: Mass, tenderness or swelling not present.         Left: Mass, tenderness or swelling not present.      Comments: Le catheter draining light pink to pale yellow urine  Musculoskeletal:         General: No swelling.   Skin:     General: Skin is warm and dry.      Coloration: Skin is not jaundiced or pale.      Findings: No rash.   Neurological:      General: No focal deficit present.      Mental Status: He is alert and oriented to person, place, and time.      Gait: Gait normal.   Psychiatric:         Mood and Affect: Mood normal.         Behavior: Behavior normal.            Lab Results: I have reviewed the following results:CBC/BMP:   .     11/13/24  0516   WBC 10.02   HGB 12.2   HCT 37.0      SODIUM 135   K 3.8      CO2 24   BUN 14   CREATININE 0.83   GLUC 111      Recent Labs     11/13/24  0516   WBC 10.02   HGB 12.2   HCT 37.0      SODIUM 135   K 3.8      CO2 24   BUN 14   CREATININE 0.83   GLUC 111     No results found for: \"COLORU\", \"CLARITYU\", \"SPECGRAV\", \"PHUR\", \"LEUKOCYTESUR\", \"NITRITE\", \"PROTEINUA\", \"GLUCOSEU\", \"KETONESU\", \"BILIRUBINUR\", \"BLOODU\",   No results found for: \"URINECX\"    Imaging Results Review: No pertinent imaging studies reviewed.  Other Study Results Review: No additional pertinent studies reviewed.    VTE Prophylaxis: VTE covered by:  apixaban, Oral, 5 mg at 11/13/24 0907        Administrative Statements   I have spent a total time of 25 minutes in caring for this patient on the day of the visit/encounter including Risks and benefits of tx options, " Instructions for management, Counseling / Coordination of care, Documenting in the medical record, Reviewing / ordering tests, medicine, procedures  , and Obtaining or reviewing history  .

## 2024-11-13 NOTE — PROGRESS NOTES
Progress Note - Vascular Surgery   Name: Jose Elias Mcgrath 69 y.o. male I MRN: [id number]  Unit/Bed#: [facility] 531-01 I Date of Admission: 11/11/2024   Date of Service: 11/13/2024 I Hospital Day: 2     Assessment & Plan  Popliteal aneurysm (HCC)  Patient is 70 yo male with PMH COPD, CAD s/p angio w/ coronary stent, HLD, tobacco abuse, who was admitted post operatively s/p left SFA to peroneal bypass with 6mm ringed PTFE (poor quality autogenous vein) and popliteal artery aneurysm ligation (DOS: 11/11/24)    Plan:  - POD2 dressing change, incisions stable, clean, well coapted  - Continue plavix, start eliquis POD2, discontinue subq heparin  - Multimodal pain control PRN  - Restarted gonzalez given requirement of multiple straight catheters  - Continue neurovascular checks, incision monitoring  - Per PT/OT: moderate resource intensity at discharge  - Per CM: pending SNF referrals for placement  - Please see attending attestation for final plan recommendations    24 Hour Events : Required straight cath multiple times, reinstated gonzalez catheter  Subjective : Patient endorses post operative pain at his incision sites. Denies any numbness/tingling/extreme weakness in lower extremity. Patient states that he was able to work with PT/OT.     Objective :  Temp:  [98 °F (36.7 °C)-99.2 °F (37.3 °C)] 99.2 °F (37.3 °C)  HR:  [58-71] 58  BP: (114-141)/(65-72) 124/69  Resp:  [16-18] 18  SpO2:  [96 %-97 %] 96 %  O2 Device: None (Room air)     I/O         11/11 0701  11/12 0700 11/12 0701  11/13 0700 11/13 0701  11/14 0700    P.O. 600 1020     I.V. (mL/kg) 2272.9 (27.9)      Total Intake(mL/kg) 2872.9 (35.2) 1020 (13.1)     Urine (mL/kg/hr) 4000 5125 (2.7)     Total Output 4000 5125     Net -1127.1 -4105                  Lines/Drains/Airways       Active Status       Name Placement date Placement time Site Days    Urethral Catheter Latex 16 Fr. 11/13/24  0533  Latex  less than 1                  Physical Exam  Vitals reviewed.    Constitutional:       Appearance: Normal appearance.   HENT:      Head: Normocephalic.   Cardiovascular:      Rate and Rhythm: Normal rate and regular rhythm.      Pulses:           Dorsalis pedis pulses are detected w/ Doppler on the left side.        Posterior tibial pulses are detected w/ Doppler on the left side.      Comments:   Left PT signal  Left peroneal signal  Left DP signal very weak  Pulmonary:      Effort: Pulmonary effort is normal.   Abdominal:      General: Abdomen is flat.   Musculoskeletal:      Cervical back: Normal range of motion.      Comments:   Incisions to medial left thigh and medial lower leg well coapted, staples intact. No signs of active infection: no purulence, no malodor, no ascending erythema, no crepitus, no fluctuance.     Skin:     General: Skin is warm.      Capillary Refill: Capillary refill takes less than 2 seconds.   Neurological:      General: No focal deficit present.      Mental Status: He is alert and oriented to person, place, and time.   Psychiatric:         Mood and Affect: Mood normal.               Lab Results: I have reviewed the following results:  CBC with diff:   Lab Results   Component Value Date    WBC 10.02 11/13/2024    HGB 12.2 11/13/2024    HCT 37.0 11/13/2024    MCV 96 11/13/2024     11/13/2024    RBC 3.86 (L) 11/13/2024    MCH 31.6 11/13/2024    MCHC 33.0 11/13/2024    RDW 14.3 11/13/2024    MPV 10.6 11/13/2024    NRBC 0 09/24/2024   ,   BMP/CMP:  Lab Results   Component Value Date    SODIUM 135 11/13/2024    SODIUM 139 10/17/2024    K 3.8 11/13/2024    K 4.3 10/17/2024     11/13/2024     10/17/2024    CO2 24 11/13/2024    CO2 25 10/17/2024    BUN 14 11/13/2024    BUN 10 10/17/2024    CREATININE 0.83 11/13/2024    CREATININE 0.9 10/17/2024    CALCIUM 8.2 (L) 11/13/2024    CALCIUM 9.3 10/17/2024    AST 15 09/25/2024    AST 17 09/11/2024    ALT 13 09/25/2024    ALT 16 09/11/2024    ALKPHOS 52 09/25/2024    ALKPHOS 72 09/11/2024    EGFR  "88 11/13/2024    EGFR >90 10/17/2024    EGFR >60.0 10/06/2020   ,   Lipid Panel: No results found for: \"CHOL\",   Coags:   Lab Results   Component Value Date    PTT 69 (H) 09/30/2024    INR 0.99 09/26/2024   ,   Blood Culture:   Lab Results   Component Value Date    BLOODCX No Growth After 5 Days. 09/24/2024   ,   Urinalysis: No results found for: \"COLORU\", \"CLARITYU\", \"SPECGRAV\", \"PHUR\", \"LEUKOCYTESUR\", \"NITRITE\", \"PROTEINUA\", \"GLUCOSEU\", \"KETONESU\", \"BILIRUBINUR\", \"BLOODU\",   Urine Culture: No results found for: \"URINECX\",   Wound Culure:   Lab Results   Component Value Date    WOUNDCULT 4+ Growth of Myroides odoratus (A) 09/24/2024    WOUNDCULT 1+ Growth of Enterobacter cloacae (A) 09/24/2024    WOUNDCULT 2+ Growth of Stenotrophomonas maltophilia (A) 09/24/2024    WOUNDCULT (A) 09/24/2024     2+ Growth of - Presumptive Acinetobacter courvalinii Acinetobacter species    WOUNDCULT 4+ Growth of Corynebacterium amycolatum (A) 09/24/2024       Imaging Results Review: No pertinent imaging studies reviewed.  Other Study Results Review: No additional pertinent studies reviewed.    VTE Prophylaxis: VTE covered by:  apixaban, Oral     "

## 2024-11-14 VITALS
OXYGEN SATURATION: 96 % | DIASTOLIC BLOOD PRESSURE: 78 MMHG | TEMPERATURE: 98 F | HEART RATE: 74 BPM | HEIGHT: 69 IN | SYSTOLIC BLOOD PRESSURE: 130 MMHG | BODY MASS INDEX: 25.8 KG/M2 | WEIGHT: 174.16 LBS | RESPIRATION RATE: 18 BRPM

## 2024-11-14 LAB
ANION GAP SERPL CALCULATED.3IONS-SCNC: 7 MMOL/L (ref 4–13)
BUN SERPL-MCNC: 15 MG/DL (ref 5–25)
CALCIUM SERPL-MCNC: 8.5 MG/DL (ref 8.4–10.2)
CHLORIDE SERPL-SCNC: 101 MMOL/L (ref 96–108)
CO2 SERPL-SCNC: 24 MMOL/L (ref 21–32)
CREAT SERPL-MCNC: 0.87 MG/DL (ref 0.6–1.3)
ERYTHROCYTE [DISTWIDTH] IN BLOOD BY AUTOMATED COUNT: 14 % (ref 11.6–15.1)
FLUAV RNA RESP QL NAA+PROBE: NEGATIVE
FLUBV RNA RESP QL NAA+PROBE: NEGATIVE
GFR SERPL CREATININE-BSD FRML MDRD: 86 ML/MIN/1.73SQ M
GLUCOSE SERPL-MCNC: 111 MG/DL (ref 65–140)
HCT VFR BLD AUTO: 39.3 % (ref 36.5–49.3)
HGB BLD-MCNC: 12.8 G/DL (ref 12–17)
MCH RBC QN AUTO: 31.3 PG (ref 26.8–34.3)
MCHC RBC AUTO-ENTMCNC: 32.6 G/DL (ref 31.4–37.4)
MCV RBC AUTO: 96 FL (ref 82–98)
PLATELET # BLD AUTO: 187 THOUSANDS/UL (ref 149–390)
PMV BLD AUTO: 9.9 FL (ref 8.9–12.7)
POTASSIUM SERPL-SCNC: 3.5 MMOL/L (ref 3.5–5.3)
RBC # BLD AUTO: 4.09 MILLION/UL (ref 3.88–5.62)
RSV RNA RESP QL NAA+PROBE: NEGATIVE
SARS-COV-2 RNA RESP QL NAA+PROBE: NEGATIVE
SODIUM SERPL-SCNC: 132 MMOL/L (ref 135–147)
WBC # BLD AUTO: 10.26 THOUSAND/UL (ref 4.31–10.16)

## 2024-11-14 PROCEDURE — 99024 POSTOP FOLLOW-UP VISIT: CPT | Performed by: SURGERY

## 2024-11-14 PROCEDURE — 0241U HB NFCT DS VIR RESP RNA 4 TRGT: CPT | Performed by: PHYSICIAN ASSISTANT

## 2024-11-14 PROCEDURE — 80048 BASIC METABOLIC PNL TOTAL CA: CPT | Performed by: PHYSICIAN ASSISTANT

## 2024-11-14 PROCEDURE — NC001 PR NO CHARGE: Performed by: SURGERY

## 2024-11-14 PROCEDURE — 85027 COMPLETE CBC AUTOMATED: CPT | Performed by: PHYSICIAN ASSISTANT

## 2024-11-14 RX ORDER — NICOTINE 21 MG/24HR
1 PATCH, TRANSDERMAL 24 HOURS TRANSDERMAL DAILY
Qty: 28 PATCH | Refills: 0
Start: 2024-11-14

## 2024-11-14 RX ORDER — LOSARTAN POTASSIUM 25 MG/1
25 TABLET ORAL DAILY
Start: 2024-11-14

## 2024-11-14 RX ORDER — ACETAMINOPHEN 325 MG/1
650 TABLET ORAL EVERY 6 HOURS PRN
Qty: 180 TABLET | Refills: 0
Start: 2024-11-14

## 2024-11-14 RX ORDER — POLYETHYLENE GLYCOL 3350 17 G/17G
17 POWDER, FOR SOLUTION ORAL DAILY
Qty: 510 G | Refills: 0
Start: 2024-11-15

## 2024-11-14 RX ORDER — BISACODYL 10 MG
10 SUPPOSITORY, RECTAL RECTAL ONCE
Status: DISCONTINUED | OUTPATIENT
Start: 2024-11-14 | End: 2024-11-14 | Stop reason: HOSPADM

## 2024-11-14 RX ORDER — METOPROLOL SUCCINATE 25 MG/1
25 TABLET, EXTENDED RELEASE ORAL DAILY
Start: 2024-11-14

## 2024-11-14 RX ORDER — DOCUSATE SODIUM 100 MG/1
100 CAPSULE, LIQUID FILLED ORAL 2 TIMES DAILY
Qty: 60 CAPSULE | Refills: 0
Start: 2024-11-14

## 2024-11-14 RX ADMIN — ACETAMINOPHEN 975 MG: 325 TABLET, FILM COATED ORAL at 12:07

## 2024-11-14 RX ADMIN — LOSARTAN POTASSIUM 25 MG: 25 TABLET, FILM COATED ORAL at 08:06

## 2024-11-14 RX ADMIN — DOCUSATE SODIUM 100 MG: 100 CAPSULE, LIQUID FILLED ORAL at 08:07

## 2024-11-14 RX ADMIN — POLYETHYLENE GLYCOL 3350 17 G: 17 POWDER, FOR SOLUTION ORAL at 08:11

## 2024-11-14 RX ADMIN — NICOTINE 14 MG: 14 PATCH, EXTENDED RELEASE TRANSDERMAL at 15:07

## 2024-11-14 RX ADMIN — APIXABAN 5 MG: 5 TABLET, FILM COATED ORAL at 17:00

## 2024-11-14 RX ADMIN — FOLIC ACID 1 MG: 1 TABLET ORAL at 08:08

## 2024-11-14 RX ADMIN — APIXABAN 5 MG: 5 TABLET, FILM COATED ORAL at 08:07

## 2024-11-14 RX ADMIN — FUROSEMIDE 20 MG: 20 TABLET ORAL at 08:07

## 2024-11-14 RX ADMIN — TAMSULOSIN HYDROCHLORIDE 0.4 MG: 0.4 CAPSULE ORAL at 08:07

## 2024-11-14 RX ADMIN — CLOPIDOGREL BISULFATE 75 MG: 75 TABLET, FILM COATED ORAL at 08:07

## 2024-11-14 RX ADMIN — EZETIMIBE 10 MG: 10 TABLET ORAL at 08:06

## 2024-11-14 NOTE — DISCHARGE SUMMARY
Discharge Summary - Podiatry   Name: Jose Elias Mcgrath 69 y.o. male I MRN: [id number]  Unit/Bed#: [facility] 531-01 I Date of Admission: 11/11/2024   Date of Service: 11/14/2024 I Hospital Day: 3    Assessment & Plan  Popliteal aneurysm (HCC)  Patient is 70 yo male with PMH COPD, CAD s/p angio w/ coronary stent, HLD, tobacco abuse, who was admitted post operatively s/p left SFA to peroneal bypass with 6mm ringed PTFE (poor quality autogenous vein) and popliteal artery aneurysm ligation (DOS: 11/11/24)     - POD3 Incisions stable, clean, well coapted. Stable for discharge pending SNF placement  - Continue plavix, eliquis  - Multimodal pain control PRN  - Bowel regimen in place  - Maintain gonzalez for retention per urology recommendations for   - Continue neurovascular checks, incision monitoring  - Per PT/OT: moderate resource intensity at discharge  - Per CM: accepting SNF on 11/14/24  - Please see attending attestation for final plan recommendations  Benign prostatic hyperplasia with urinary obstruction  Post operatively on POD1 required multiple straight cath due to retention, ultimately POD2 inserted urinary catheter per protocol     - Consult urology, appreciate recommendations   - Maintain urinary catheter  - Continue flomax 0.4 mg daily  - Maintain bowel regimen: colace, miralax  - Void trial 1 week as outpatient (in office or SNF)    Admission Date: 11/11/2024 0824  Discharge Date: 11/14/24  Admitting Diagnosis: Popliteal artery aneurysm (HCC) [I72.4]  Discharge Diagnosis: Popliteal artery aneurysm (HCC) [I72.4]  Medical Problems       Resolved Problems  Date Reviewed: 10/1/2024   None         HPI from H&P: Jose Elias Mcgrath is a 69 y.o. male with PMH of NSTEMI s/p PCI in 2021 (last echo 7/2023 EF 55%, no clinically significant AS or valvular regurgitation). On eliquis for BL pop aneurysms and plavix (transitioned from brillinta due to starting eliquis). He presents today to discuss BL pop aneurysms, which were  incidentally identified during recent hospitalization (details outlined below).      9/26/2024 CTA demonstrated 3.6 cm infrarenal AAA in addition to left 2.6 cm popliteal aneurysm with mural thrombus and a right 1.5 cm popliteal aneurysm.  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  He does fortunately have preserved peroneal runoff bilaterally.  This appears to be a chronic process given the robust size of his peroneal arteries and he denies any pain/sensory/motor deficits.     Based on review of care everywhere notes he presented to an urgent care in 8/2023 with foot pain, had been treating himself for athlete's foot with antifungal cream.  He was given a prescription for 7d doxycycline and 2d fluconazole.  He had been treated with 3 months of terbinafine prior to this.  Since 1/2024, he has been following with dermatology and treated with antifungal creams and Bactrim/Keflex.  He was also diagnosed with eczema and started on methotrexate for this but has not been taking it.      He was admitted on 9/24/2024 for bilateral lower extremity swelling and cellulitis.  He was treated with IV antibiotics and blood cultures were no growth x5d.  BL venous duplex was negative for DVT.  Underlying factor promoting cellulitis infection was determined to be poorly controlled eczema.  Plan for this was obtaining better control of his eczema in outpatient setting following successful treatment of cellulitis.    HPI from date of discharge: see progress note 11/14    Procedures Performed:   - Left SFA to peroneal bypass with 6mm ringed PTFE (poor quality autogenous vein) and popliteal artery aneurysm ligation (DOS: 11/11/24)     Summary of Hospital Course:   Mr. Jose Elias Mcgrath presented to [facility] for a scheduled left SFA to peroneal bypass with popliteal artery aneurysm ligation with Dr. Riggins on 11/11/24. Patient had uneventful anesthesia pre and post operative assessments after  "general with endotracheal anesthesia. Preoperatively an arterial line and gonzalez were placed. The procedure had no complications, at conclusion of case there was notable friable/diseased left peroneal artery with augmentation of left PT signal with graft compression following ligation of popliteal artery. Patient was admitted post operatively with uneventful post operative assessment with minimal bloody drainage. On POD1 plavix was restarted, continued subq heparin for DVT/PE prophylaxis, arterial line was discontinued, and gonzalez was removed. He had notable signals in PT and peroneal with weak AT signals. PT/OT evaluated the patient and determined need for placement at SNF facility after discharge. Patient required multiple straight catheter placements due to urinary retention post operatively, ultimately resulting in protocol reinsertion of gonzalez catheter on POD2. Urology was consulted with recommendations: continue tamsulosin, bowel regimen, maintain gonzalez catheter and plan for outpatient void trial. Incisions were inspected and deemed stable with staples intact, no longer requiring mepilex dressings. Patient deemed stable for discharge on POD3 from all teams with CM assistance for SNF discharge.    Significant Findings, Care, Treatment and Services Provided:   - Left SFA to peroneal bypass with 6mm ringed PTFE (poor quality autogenous vein) and popliteal artery aneurysm ligation (DOS: 11/11/24)   - Urology consult for gonzalez catheter due to urinary retention    Complications:   - Post operative urinary retention, treated with maintained gonzalez catheter with outpatient urology follow up/    Discharge Day Visit / Exam:   /78   Pulse 74   Temp 98 °F (36.7 °C)   Resp 18   Ht 5' 9\" (1.753 m)   Wt 79 kg (174 lb 2.6 oz)   SpO2 96%   BMI 25.72 kg/m²     Condition at Discharge: stable     Discharge instructions/Information to patient and family:   See After Visit Summary (AVS) for information provided to patient " and family.      Provisions for Follow-Up Care:  See after visit summary for information related to follow-up care and any pertinent home health orders.      PCP: Ila Braswell MD    Disposition: Skilled nursing facility at [facility] in [place], [place]    Planned Readmission: No     Discharge Medications:  See after visit summary for reconciled discharge medications provided to patient and family.      Discharge Statement:  I have spent a total time of 25 minutes in caring for this patient on the day of the visit/encounter.

## 2024-11-14 NOTE — ASSESSMENT & PLAN NOTE
Patient is 68 yo male with PMH COPD, CAD s/p angio w/ coronary stent, HLD, tobacco abuse, who was admitted post operatively s/p left SFA to peroneal bypass with 6mm ringed PTFE (poor quality autogenous vein) and popliteal artery aneurysm ligation (DOS: 11/11/24)    Plan:  - POD3 Incisions stable, clean, well coapted. Stable for discharge pending SNF placement  - Continue plavix, eliquis  - Multimodal pain control PRN  - Bowel regimen in place  - Maintain gonzalez for retention per urology recommendations for   - Continue neurovascular checks, incision monitoring  - Per PT/OT: moderate resource intensity at discharge  - Per CM: pending SNF referrals for placement  - Please see attending attestation for final plan recommendations

## 2024-11-14 NOTE — RESTORATIVE TECHNICIAN NOTE
Restorative Technician Note      Patient Name: Jose Elias Malinkeyanaanca     Restorative Tech Visit Date: 11/14/24  Note Type: Mobility  Patient Position Upon Consult: Supine  Activity Performed: Ambulated  Assistive Device: Roller walker  Patient Position at End of Consult: Bedside chair; All needs within reach; Bed/Chair alarm activated

## 2024-11-14 NOTE — PROGRESS NOTES
Progress Note - Podiatry   Name: Jose Elias Mcgrath 69 y.o. male I MRN: [id number]  Unit/Bed#: [facility] 531-01 I Date of Admission: 11/11/2024   Date of Service: 11/14/2024 I Hospital Day: 3     Assessment & Plan  Popliteal aneurysm (HCC)  Patient is 68 yo male with PMH COPD, CAD s/p angio w/ coronary stent, HLD, tobacco abuse, who was admitted post operatively s/p left SFA to peroneal bypass with 6mm ringed PTFE (poor quality autogenous vein) and popliteal artery aneurysm ligation (DOS: 11/11/24)    Plan:  - POD3 Incisions stable, clean, well coapted. Stable for discharge pending SNF placement  - Continue plavix, eliquis  - Multimodal pain control PRN  - Bowel regimen in place  - Maintain gonzalez for retention per urology recommendations for   - Continue neurovascular checks, incision monitoring  - Per PT/OT: moderate resource intensity at discharge  - Per CM: pending SNF referrals for placement  - Please see attending attestation for final plan recommendations    Benign prostatic hyperplasia with urinary obstruction  Post operatively on POD1 required multiple straight cath due to retention, ultimately POD2 inserted urinary catheter per protocol    Plan:  - Consult urology, appreciate recommendations   - Maintain urinary catheter  - Continue flomax 0.4 mg daily  - Maintain bowel regimen: colace, miralax  - Void trial 1 week as outpatient (in office or SNF)    24 Hour Events : None  Subjective : Patient states he has had moderate improvement in left leg pain and strength, particularly during his PT sessions. He denies any numbness or tingling in the leg. He denies any nausea, vomiting, shortness of breath, chest pain, fevers, chills.    Objective :  Temp:  [97.6 °F (36.4 °C)-98.6 °F (37 °C)] 98.2 °F (36.8 °C)  HR:  [67-90] 82  BP: (120-134)/(73-79) 121/74  Resp:  [18] 18  SpO2:  [95 %-98 %] 98 %  O2 Device: None (Room air)     I/O         11/12 0701 11/13 0700 11/13 0701  11/14 0700 11/14 0701  11/15 0700    P.O. 1020  780     I.V. (mL/kg)       Total Intake(mL/kg) 1020 (13.1) 780 (9.9)     Urine (mL/kg/hr) 5125 (2.7) 3250 (1.7)     Total Output 5125 3250     Net -7870 -0684                  Lines/Drains/Airways       Active Status       Name Placement date Placement time Site Days    Urethral Catheter Latex 16 Fr. 11/13/24  0533  Latex  1                  Physical Exam  Vitals reviewed.   Constitutional:       Appearance: Normal appearance.   Eyes:      General: Lids are normal.   Cardiovascular:      Rate and Rhythm: Normal rate and regular rhythm.      Comments:   Left PT signal  Left DP signal weak  Left peroneal signal  Pulmonary:      Effort: Pulmonary effort is normal.   Abdominal:      General: Abdomen is flat.      Palpations: Abdomen is soft.   Musculoskeletal:      Comments:   Incisions to medial left thigh and medial lower leg well coapted, staples intact. No signs of active infection: no purulence, no malodor, no ascending erythema, no crepitus, no fluctuance. Left open to air   Neurological:      Mental Status: He is alert and oriented to person, place, and time.      Sensory: No sensory deficit.      Motor: No weakness.   Psychiatric:         Mood and Affect: Mood normal.         Behavior: Behavior is cooperative.             Lab Results: I have reviewed the following results:  CBC with diff:   Lab Results   Component Value Date    WBC 10.26 (H) 11/14/2024    HGB 12.8 11/14/2024    HCT 39.3 11/14/2024    MCV 96 11/14/2024     11/14/2024    RBC 4.09 11/14/2024    MCH 31.3 11/14/2024    MCHC 32.6 11/14/2024    RDW 14.0 11/14/2024    MPV 9.9 11/14/2024    NRBC 0 09/24/2024   ,   BMP/CMP:  Lab Results   Component Value Date    SODIUM 132 (L) 11/14/2024    SODIUM 139 10/17/2024    K 3.5 11/14/2024    K 4.3 10/17/2024     11/14/2024     10/17/2024    CO2 24 11/14/2024    CO2 25 10/17/2024    BUN 15 11/14/2024    BUN 10 10/17/2024    CREATININE 0.87 11/14/2024    CREATININE 0.9 10/17/2024    CALCIUM 8.5  "11/14/2024    CALCIUM 9.3 10/17/2024    AST 15 09/25/2024    AST 17 09/11/2024    ALT 13 09/25/2024    ALT 16 09/11/2024    ALKPHOS 52 09/25/2024    ALKPHOS 72 09/11/2024    EGFR 86 11/14/2024    EGFR >90 10/17/2024    EGFR >60.0 10/06/2020   ,   Lipid Panel: No results found for: \"CHOL\",   Coags:   Lab Results   Component Value Date    PTT 69 (H) 09/30/2024    INR 0.99 09/26/2024   ,   Blood Culture:   Lab Results   Component Value Date    BLOODCX No Growth After 5 Days. 09/24/2024   ,   Urinalysis: No results found for: \"COLORU\", \"CLARITYU\", \"SPECGRAV\", \"PHUR\", \"LEUKOCYTESUR\", \"NITRITE\", \"PROTEINUA\", \"GLUCOSEU\", \"KETONESU\", \"BILIRUBINUR\", \"BLOODU\",   Urine Culture: No results found for: \"URINECX\",   Wound Culure:   Lab Results   Component Value Date    WOUNDCULT 4+ Growth of Myroides odoratus (A) 09/24/2024    WOUNDCULT 1+ Growth of Enterobacter cloacae (A) 09/24/2024    WOUNDCULT 2+ Growth of Stenotrophomonas maltophilia (A) 09/24/2024    WOUNDCULT (A) 09/24/2024     2+ Growth of - Presumptive Acinetobacter courvalinii Acinetobacter species    WOUNDCULT 4+ Growth of Corynebacterium amycolatum (A) 09/24/2024       Imaging Results Review: No pertinent imaging studies reviewed.  Other Study Results Review: No additional pertinent studies reviewed.    VTE Prophylaxis: VTE covered by:  apixaban, Oral, 5 mg at 11/13/24 1702     "

## 2024-11-14 NOTE — ASSESSMENT & PLAN NOTE
Post operatively on POD1 required multiple straight cath due to retention, ultimately POD2 inserted urinary catheter per protocol    Plan:  - Consult urology, appreciate recommendations   - Maintain urinary catheter  - Continue flomax 0.4 mg daily  - Maintain bowel regimen: colace, miralax  - Void trial 1 week as outpatient (in office or SNF)

## 2024-11-14 NOTE — TELEPHONE ENCOUNTER
Please follow up in a day of two to see when patient will be discharged to see if he will need to be scheduled with a provider or for a void trial all depends if patient is going to rehab after discharge.

## 2024-11-14 NOTE — ANESTHESIA POSTPROCEDURE EVALUATION
Post-Op Assessment Note    CV Status:  Stable  Pain Score: 0    Pain management: adequate       Mental Status:  Awake and sleepy   Hydration Status:  Stable   PONV Controlled:  None   Airway Patency:  Patent     Post Op Vitals Reviewed: Yes    No anethesia notable event occurred.    Staff: Anesthesiologist           Last Filed PACU Vitals:  Vitals Value Taken Time   Temp 98.4 °F (36.9 °C) 11/11/24 1600   Pulse 61 11/11/24 1645   /81 11/11/24 1645   Resp 12 11/11/24 1645   SpO2 97 % 11/11/24 1636   Vitals shown include unfiled device data.    Modified Lior:  No data recorded

## 2024-11-14 NOTE — TELEPHONE ENCOUNTER
Patient called in asking if rehab will have access to his medical records. Advised since they are associated with [facility] they should. No further assistance needed.

## 2024-11-14 NOTE — PROGRESS NOTES
Patient:    MRN:  [id number]    Salma Request ID:  [id number]    Level of care reserved:  Skilled Nursing Facility    Partner Reserved:  [facility] [place] [address] [phone]    Clinical needs requested:    Geography searched:  15 miles around [id number]    Start of Service:    Request sent:  2:45pm EST on 11/12/2024 by Jose Angel Fofana    Partner reserved:  1:30pm EST on 11/14/2024 by Jose Angel Fofana    Choice list shared:  12:45pm EST on 11/14/2024 by Jose Angel Fofana

## 2024-11-14 NOTE — ASSESSMENT & PLAN NOTE
Post operatively on POD1 required multiple straight cath due to retention, ultimately POD2 inserted urinary catheter per protocol     - Consult urology, appreciate recommendations   - Maintain urinary catheter  - Continue flomax 0.4 mg daily  - Maintain bowel regimen: colace, miralax  - Void trial 1 week as outpatient (in office or SNF)

## 2024-11-14 NOTE — ASSESSMENT & PLAN NOTE
Patient is 68 yo male with PMH COPD, CAD s/p angio w/ coronary stent, HLD, tobacco abuse, who was admitted post operatively s/p left SFA to peroneal bypass with 6mm ringed PTFE (poor quality autogenous vein) and popliteal artery aneurysm ligation (DOS: 11/11/24)     - POD3 Incisions stable, clean, well coapted. Stable for discharge pending SNF placement  - Continue plavix, eliquis  - Multimodal pain control PRN  - Bowel regimen in place  - Maintain gonzalez for retention per urology recommendations for   - Continue neurovascular checks, incision monitoring  - Per PT/OT: moderate resource intensity at discharge  - Per CM: accepting SNF on 11/14/24  - Please see attending attestation for final plan recommendations

## 2024-11-14 NOTE — CASE MANAGEMENT
Case Management Discharge Note    Patient name Jose Elias Mcgrath  Location [facility]-01 MRN [id number]  : 1955 Date 2024       Current Admission Date: 2024  Current Admission Diagnosis:Popliteal aneurysm (HCC)      LOS (days): 3  Geometric Mean LOS (GMLOS) (days): 1.8  Days to GMLOS:-1.2     OBJECTIVE:  Risk of Unplanned Readmission Score: 10.94   Current admission status: Inpatient   Primary Insurance: MEDICARE  Secondary Insurance:  FOR LIFE    DISCHARGE DETAILS:  Discharge planning discussed with:: Patient  Freedom of Choice: Yes  CM contacted family/caregiver?: Yes  Were Treatment Team discharge recommendations reviewed with patient/caregiver?: Yes  Did patient/caregiver verbalize understanding of patient care needs?: Yes  Were patient/caregiver advised of the risks associated with not following Treatment Team discharge recommendations?: Yes    Contacts  Patient Contacts: Karis Mcgrath (pt's sister-in-law)  Relationship to Patient:: Family  Contact Method: Phone  Phone Number: [phone]  Reason/Outcome: Emergency Contact    Treatment Team Recommendation: Short Term Rehab  Discharge Destination Plan:: Short Term Rehab  Transport at Discharge : [facility] Ambulance  Number/Name of Dispatcher: [phone]  Transported by (Company and Unit #): [facility]  ETA of Transport (Date): 24  ETA of Transport (Time): 1700 (5:00PM)    Additional Comments: Pt is cleared for d/c by Vascular Surgery HARI Quiles. RN Mohini Eden was notified of pt's d/c order. Pt is accepted for short-term skilled rehab placement at [facility] located in [facility]. CM arranged BLS ambulance via [facility] for 5:00PM pickup this day to transport pt to SNF. The pt and his sister-in-law Karis Mcgrath were both informed of d/c. IMM signed by pt. PCS for transport was completed by CM. Chart copy for SNF was requested from RN. No further CM needs.    Accepting Facility Name,  City & State : [facility] / [place]  Receiving Facility/Agency Phone Number: [phone]  Facility/Agency Fax Number: [phone]

## 2024-11-15 NOTE — TELEPHONE ENCOUNTER
[facility]  Call back: [phone]  Fax: [phone]    Stated facility is able to remove the catheter in the morning, will need order faxed and can bring the patient in for an afternoon appointment, was hoping for 11/22 or 11/26. Please advise.

## 2024-11-15 NOTE — TELEPHONE ENCOUNTER
Spoke with pt, he is aware he needs a void trial, this will be done in the facility.  Pt prefers for the void trial to be done on Monday 11/18/24, not over the weekend.    If he passes the void trial, he does not need a follow up appt for a few months. He will call us next week to let us know if he passed or failed the void trial.  Faxed basic order to eriberto Pettit regarding above. Confirmation received.  Relayed this message:  [facility],  As per chart review, pt is to have a void trial in one week. After speaking in length with pt, he prefers this procedure to be done on 11/18/24 to allow more time for his bladder to recover. Pt had a longer than expected surgery and went into urinary retention.  Please follow facility protocol for void trial. If no void after 8 -10 hours, replace gonzalez and call Urology department in [place] at [phone] to arrange an appt with any Urologist the last week of November or beginning of December to repeat a void trial.  Please call us after his void trial next week to report if he passed or failed.  If pt passes, he does not need an appt with Urology for 2-3 months, unless he requests.

## 2024-11-18 ENCOUNTER — TELEPHONE (OUTPATIENT)
Dept: VASCULAR SURGERY | Facility: CLINIC | Age: 69
End: 2024-11-18

## 2024-11-18 NOTE — TELEPHONE ENCOUNTER
Poonam from [facility] called and stated the pt was to have his gonzalez removed this morning at 6am and placed on a void trial but he refused to have his catheter removed and stated it was to remain in placed until tomorrow. Poonam wanted to make the provider aware.

## 2024-11-18 NOTE — TELEPHONE ENCOUNTER
Vascular Nurse Navigator Post Op Call      Contacted [facility] and was transferred to subacute unit and left message for them to return call.      Procedure: - Left SFA to peroneal artery bypass with 6mm ringed PTFE (poor quality autogenous vein)  - Left popliteal artery aneurysm exclusion via proximal and distal ligation of popliteal artery    Date of Procedure: 11/11/24    Surgeon:   * Elias Riggins MD - Primary     * JENNIFFER Gilman - Assisting        Discharge Date: 11/14/24    Discharge Disposition: Other Skilled Nursing Facility at [facility]        Anticoagulation pt was discharged on post op?: Apixaban (Eliquis) and Clopidogrel (Plavix)    Statin pt was discharged on post op?:  Rosuvastatin (Crestor)      NEXT OFFICE VISIT SCHEDULED:  11/26/24 at 2:30 pm with Dr. Riggins at [facility]

## 2024-11-18 NOTE — TELEPHONE ENCOUNTER
Spoke with Maci DINH, it is ok for pt to have void trial tomorrow morning.  Verbalized understanding. Nothing else needed at this time.

## 2024-11-19 NOTE — TELEPHONE ENCOUNTER
Pt KONG Cardozo calling to say that pt had catheter removed this morning and is voiding adequally

## 2024-11-20 ENCOUNTER — NURSE TRIAGE (OUTPATIENT)
Age: 69
End: 2024-11-20

## 2024-11-20 NOTE — TELEPHONE ENCOUNTER
Contacted [facility] - [phone] - and spoke with Roz.  Informed her of information and recommendations from MELQUIADES Caballero.  Informed her of information for patient's post op appointment.  All questions answered.

## 2024-11-20 NOTE — TELEPHONE ENCOUNTER
Reviewed photos submitted in chart.  Incision does not appear acutely infected and I am questioning whether redness is related to lower extremity edema as his leg looks quite swollen. At this time, I would advise that they continue to monitor the incision and elevate the left lowre extremity frequently throughout the day. Incision should be washed daily with soap and water and painted with Betadine.  Incisions should also be covered to keep protected from bacteria.  Should he develop any fever, chills, or worsening redness/drainage from his incisions then we can try to get him into the office sooner otherwise keep appointment as scheduled on 11/26/2024.

## 2024-11-20 NOTE — TELEPHONE ENCOUNTER
Vascular Nurse Navigator Post Op Call    Procedure: - Left SFA to peroneal artery bypass with 6mm ringed PTFE (poor quality autogenous vein)  - Left popliteal artery aneurysm exclusion via proximal and distal ligation of popliteal artery     Date of Procedure: 11/11/24     Surgeon:   * Elias Riggins MD - Primary     * JENNIFFER Gilman - Assisting         Discharge Date: 11/14/24     Discharge Disposition: Other Skilled Nursing Facility at [facility]       Leg Weakness?: No    Leg Swelling?: See below    Leg Numbness?: see below    Chest Pain?: No    Shortness of Breath?: No    Orthopnea?: No    Anticoagulation pt was discharged on post op?: Apixaban (Eliquis) and Clopidogrel (Plavix)    Statin pt was discharged on post op?:  Rosuvastatin (Crestor)    Bleeding?: No    Uncontrolled Pain?: No    Incision Concerns?: see below    Fever or Chills?: No      Reviewed discharge instructions and incision care with patient.      NEXT OFFICE VISIT SCHEDULED:  11/26/24 at 2:30 pm with Dr. Riggins at [facility] Confirmed?: Yes      Any further questions/concerns?  Spoke with Roz at [facility] in regards to above.  Please see telephone encounter from 11/20/24 for more details.  Reviewed incision care with her - wash daily with soap and water.  Reviewed discharge medications - Eliquis and Plavix.  All questions answered.

## 2024-11-20 NOTE — TELEPHONE ENCOUNTER
"Pt is s/p  LEFT FEMORAL DISTAL BYPASS WITH PTFE GRAFT; LIGATION OF DISTAL ANEURYSM (Left: Leg Upper) on 11/11. He was d/c'd from the hospital on 11/14. Pt has a post op appt scheduled with Dr. Riggins on 11/16/2024.      Received call from Roz from [facility] reporting redness around his left lower leg incision site starting this am. She states the redness is around the incision and travels slightly down his left leg. Denies any drainage, fever, chills. Per Roz, the area is slightly warm and slightly swollen. He complains of a dull aching pain that comes and goes and a numbness/tingling sensation in the foot and ankle. Advised Roz to send a photo of the area through [employer] which she agreed.    Advised will send a message to the provider to review and advise and await the photo.       Answer Assessment - Initial Assessment Questions  1. SYMPTOM: \"What's the main symptom you're concerned about?\" (e.g., drainage, incision opened up, pain, redness)      Redness around his incision site that is traveling slightly down his leg.  2. ONSET: \"When did redness  start?\"      today  3. SURGERY: \"What surgery did you have?\"      LEFT FEMORAL DISTAL BYPASS WITH PTFE GRAFT; LIGATION OF DISTAL ANEURYSM (Left: Leg Upper)  4. DATE of SURGERY: \"When was the surgery?\"       11/11/2024  5. INCISION SITE: \"Where is the incision located?\"       Left groin  6. REDNESS: \"Is there any redness at the incision site?\" If Yes, ask: \"How wide across is the redness?\" (Inches, centimeters)       Redness around incision site traveling slightly down the leg   7. PAIN: \"Is there any pain?\" If Yes, ask: \"How bad is it?\"  (Scale 1-10; or mild, moderate, severe)      States he complains of a dull aching pain that comes and goes   8. BLEEDING: \"Is there any bleeding?\" If Yes, ask: \"How much?\" and \"Where?\"      denies  9. DRAINAGE: \"Is there any drainage from the incision site?\" If Yes, ask: \"What color and how much?\" (e.g., red, cloudy, " "pus; drops, teaspoon)      denies  10. FEVER: \"Do you have a fever?\" If Yes, ask: \"What is your temperature, how was it measured, and when did it start?\"        denies  11. OTHER SYMPTOMS: \"Do you have any other symptoms?\" (e.g., dizziness, rash elsewhere on body, shaking chills, weakness)        States has numbness and tingling present in the foot and ankle    Protocols used: Post-Op Incision Symptoms and Questions-Adult-[place]    "

## 2024-11-26 ENCOUNTER — OFFICE VISIT (OUTPATIENT)
Dept: VASCULAR SURGERY | Facility: CLINIC | Age: 69
End: 2024-11-26

## 2024-11-26 VITALS
RESPIRATION RATE: 18 BRPM | HEIGHT: 69 IN | BODY MASS INDEX: 26.38 KG/M2 | TEMPERATURE: 97.1 F | DIASTOLIC BLOOD PRESSURE: 60 MMHG | HEART RATE: 62 BPM | SYSTOLIC BLOOD PRESSURE: 100 MMHG | OXYGEN SATURATION: 99 % | WEIGHT: 178.1 LBS

## 2024-11-26 DIAGNOSIS — I72.4 POPLITEAL ANEURYSM (HCC): Primary | ICD-10-CM

## 2024-11-26 PROCEDURE — 99024 POSTOP FOLLOW-UP VISIT: CPT | Performed by: STUDENT IN AN ORGANIZED HEALTH CARE EDUCATION/TRAINING PROGRAM

## 2024-11-26 NOTE — PROGRESS NOTES
Vascular Surgery Return Patient Visit  Date: 11/26/24      Assessment:  Jose Elias Mcgrath is a 69 y.o. male s/p L SFA-peroneal artery bypass w/ PTFE for pop aneurysm exclusion. He has some LLE swelling post op. ACE wrap applied to his leg in office today. In presence of swelling, do not think staples are quite ready for removal.       Plan:  -Return in 2 weeks to re-assess swelling/consider staple removal  -Continue applying ACE wrap daily to LLE.     Diagnoses and all orders for this visit:    Popliteal aneurysm (HCC)        Subjective:     HPI:  Jose Elias Mcgrath is a 69 y.o. male who underwent L SFA-peroneal bypass w/ ringed PTFE for pop aneurysm exclusion. He is doing well post op. No concerns about his incision and he is doing well with daily PT at rehab. He does have some LLE swelling that has persisted since surgery.     Initial visit 10/1/24:  Jose Elias Mcgrath is a 69 y.o. male with PMH of NSTEMI s/p PCI in 2021 (last echo 7/2023 EF 55%, no clinically significant AS or valvular regurgitation). On eliquis for BL pop aneurysms and plavix (transitioned from brillinta due to starting eliquis). He presents today to discuss BL pop aneurysms, which were incidentally identified during recent hospitalization (details outlined below).      9/26/2024 CTA demonstrated 3.6 cm infrarenal AAA in addition to left 2.6 cm popliteal aneurysm with mural thrombus and a right 1.5 cm popliteal aneurysm.  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  He does fortunately have preserved peroneal runoff bilaterally.  This appears to be a chronic process given the robust size of his peroneal arteries and he denies any pain/sensory/motor deficits.     Based on review of care everywhere notes he presented to an urgent care in 8/2023 with foot pain, had been treating himself for athlete's foot with antifungal cream.  He was given a prescription for 7d doxycycline and 2d fluconazole.  He had been treated with 3 months  "of terbinafine prior to this.  Since 1/2024, he has been following with dermatology and treated with antifungal creams and Bactrim/Keflex.  He was also diagnosed with eczema and started on methotrexate for this but has not been taking it.      He was admitted on 9/24/2024 for bilateral lower extremity swelling and cellulitis.  He was treated with IV antibiotics and blood cultures were no growth x5d.  BL venous duplex was negative for DVT.  Underlying factor promoting cellulitis infection was determined to be poorly controlled eczema.  Plan for this was obtaining better control of his eczema in outpatient setting following successful treatment of cellulitis.    Objective:    ROS:  All systems were reviewed and are negative except those mentioned in HPI and below.      Vitals: /60 (BP Location: Left arm, Patient Position: Sitting, Cuff Size: Large)   Pulse 62   Temp (!) 97.1 °F (36.2 °C) (Tympanic)   Resp 18   Ht 5' 9\" (1.753 m)   Wt 80.8 kg (178 lb 1.6 oz)   SpO2 99%   BMI 26.30 kg/m²      General: male appears stated age, no apparent distress, alert and oriented   HEENT: normocephalic, atraumatic   Cardiovascular: hemodynamically stable   Chest/Lungs: no increased work of breathing, chest rise equal bilaterally   Abdomen: Soft, ND, NT, no pulsatile masses   Extremities: LLE pitting edema up to about the level of the mid-thigh    L DP/PT signals   Skin: warm and dry   Neuro: no gross deficits      Medications:  Current Outpatient Medications   Medication Sig Dispense Refill    acetaminophen (TYLENOL) 325 mg tablet Take 2 tablets (650 mg total) by mouth every 6 (six) hours as needed for mild pain 180 tablet 0    apixaban (Eliquis) 5 mg Take 1 tablet (5 mg total) by mouth 2 (two) times a day 60 tablet 0    clopidogrel (PLAVIX) 75 mg tablet Take 1 tablet (75 mg total) by mouth daily 30 tablet 5    docusate sodium (COLACE) 100 mg capsule Take 1 capsule (100 mg total) by mouth 2 (two) times a day 60 capsule 0 "    econazole nitrate 1 % cream APPLY TO FEET DAILY      ezetimibe (ZETIA) 10 mg tablet Take 10 mg by mouth daily      furosemide (LASIX) 20 mg tablet Take 20 mg by mouth daily      losartan (COZAAR) 25 mg tablet Take 1 tablet (25 mg total) by mouth daily      metoprolol succinate (TOPROL-XL) 25 mg 24 hr tablet 1 tablet (25 mg total) daily      nicotine (NICODERM CQ) 14 mg/24hr TD 24 hr patch Place 1 patch on the skin over 24 hours daily 28 patch 0    polyethylene glycol (MIRALAX) 17 g packet Take 17 g by mouth daily 510 g 0    rosuvastatin (CRESTOR) 40 MG tablet       tamsulosin (FLOMAX) 0.4 mg Take 0.4 mg by mouth every morning       No current facility-administered medications for this visit.       Allergies:  No Known Allergies     PMH:  Past Medical History:   Diagnosis Date    BPH (benign prostatic hyperplasia)     CAD (coronary artery disease)     Enlarged prostate     Hyperlipidemia     Hypertension     Vitamin D deficiency         PSH:  Past Surgical History:   Procedure Laterality Date    COLONOSCOPY      CORONARY ANGIOPLASTY Left 02/26/2021    with heart cath    CORONARY STENT PLACEMENT      INGUINAL HERNIA REPAIR  11/29/2017    OK BYPASS W/VEIN FEMORAL-POPLITEAL Left 11/11/2024    Procedure: LEFT FEMORAL DISTAL BYPASS WITH PTFE GRAFT; LIGATION OF DISTAL ANEURYSM;  Surgeon: Elias Riggins MD;  Location: [facility];  Service: Vascular    TONSILECTOMY AND ADNOIDECTOMY          FHx:  Family History   Problem Relation Age of Onset    Heart attack Father     Prostate cancer Father         SHx:  Social History     Socioeconomic History    Marital status:      Spouse name: Not on file    Number of children: Not on file    Years of education: Not on file    Highest education level: Not on file   Occupational History    Not on file   Tobacco Use    Smoking status: Every Day     Current packs/day: 1.00     Average packs/day: 1 pack/day for 40.1 years (40.1 ttl pk-yrs)     Types: Cigarettes     Start  date: 10/30/1984    Smokeless tobacco: Never   Vaping Use    Vaping status: Never Used   Substance and Sexual Activity    Alcohol use: Not Currently    Drug use: Never    Sexual activity: Not on file   Other Topics Concern    Not on file   Social History Narrative    Not on file     Social Drivers of Health     Financial Resource Strain: Not on file   Food Insecurity: No Food Insecurity (11/12/2024)    Hunger Vital Sign     Worried About Running Out of Food in the Last Year: Never true     Ran Out of Food in the Last Year: Never true   Transportation Needs: No Transportation Needs (11/12/2024)    PRAPARE - Transportation     Lack of Transportation (Medical): No     Lack of Transportation (Non-Medical): No   Physical Activity: Not on file   Stress: Not on file   Social Connections: Unknown (6/18/2024)    Received from [employer] [facility]     How often do you feel lonely or isolated from those around you? (Adult - for ages 18 years and over): Not on file   Intimate Partner Violence: Unknown (11/11/2024)    Nursing IPS     Feels Physically and Emotionally Safe: Not on file     Physically Hurt by Someone: Not on file     Humiliated or Emotionally Abused by Someone: Not on file     Physically Hurt by Someone: 2     Hurt or Threatened by Someone: 2   Housing Stability: Low Risk  (11/12/2024)    Housing Stability Vital Sign     Unable to Pay for Housing in the Last Year: No     Number of Times Moved in the Last Year: 1     Homeless in the Last Year: No

## 2024-12-13 ENCOUNTER — TELEPHONE (OUTPATIENT)
Age: 69
End: 2024-12-13

## 2024-12-13 NOTE — TELEPHONE ENCOUNTER
Pt called to question if he should hold his Eliquis and Plavix prior to having his staples removed. Explained that if he was not instructed to do so, then he should never hold those medications. Also instructed the staples will not be removed unless they are ready to come out, meaning the skin will be closed. Pt verbalized understanding.

## 2024-12-15 NOTE — PATIENT INSTRUCTIONS
Plan: POD #35 after LLE bypass with PTFE and ligation of distal aneurysm  -Continue with clopidogrel, apixaban 5 BID, and rosuvastatin 40  -Smoking cessation is strongly recommended  -Patient education regarding post-bypass  -Check baseline TIERA in Feb '25 and OV with Dr. Riggins  -Call if any concerns, such as separation of incision, redness, increased pain, swelling, etc.      Local care:  Cleanse every day with surgical scrub  May shower  No bathing or soaking in water  May wear Ace wrap during the day, elevate the leg periodically and low-sodium to help prevent leg swelling

## 2024-12-16 ENCOUNTER — OFFICE VISIT (OUTPATIENT)
Dept: VASCULAR SURGERY | Facility: CLINIC | Age: 69
End: 2024-12-16

## 2024-12-16 VITALS
BODY MASS INDEX: 26.07 KG/M2 | OXYGEN SATURATION: 96 % | DIASTOLIC BLOOD PRESSURE: 74 MMHG | HEIGHT: 69 IN | WEIGHT: 176 LBS | HEART RATE: 77 BPM | SYSTOLIC BLOOD PRESSURE: 132 MMHG | RESPIRATION RATE: 18 BRPM

## 2024-12-16 DIAGNOSIS — E78.5 HYPERLIPIDEMIA, UNSPECIFIED HYPERLIPIDEMIA TYPE: ICD-10-CM

## 2024-12-16 DIAGNOSIS — Z72.0 TOBACCO USER: ICD-10-CM

## 2024-12-16 DIAGNOSIS — I73.9 PERIPHERAL ARTERIAL DISEASE (HCC): Primary | ICD-10-CM

## 2024-12-16 DIAGNOSIS — Z98.890 S/P FEMORAL-TIBIAL BYPASS: ICD-10-CM

## 2024-12-16 DIAGNOSIS — I72.4 POPLITEAL ANEURYSM (HCC): ICD-10-CM

## 2024-12-16 PROCEDURE — 99024 POSTOP FOLLOW-UP VISIT: CPT | Performed by: PHYSICIAN ASSISTANT

## 2024-12-16 NOTE — LETTER
2024     Elias Riggins MD  [address] [facility] [address]    Patient: Jose Elias Mcgrath   YOB: 1955   Date of Visit: 2024       Dear Dr. Riggins:     Jose Elias Mcgrath was seen in the office by me for post bypass/popliteal aneurysm repair.  He was advancing and doing well.  Staples were removed.  Note attached.       Sincerely,        Charo Billy PA-C        CC: No Recipients    Charo Billy PA-C  2024  1:29 AM  Sign when Signing Visit  Name: Jose Elias Mcgrath      : 1955      MRN: [id number]  Encounter Provider: Charo Billy PA-C  Encounter Date: 2024   Enc[facility] department: [facility] [place]  :  Assessment & Plan  Popliteal aneurysm (HCC)  S/P left femoral to distal bypass with PTFE graft and ligation of distal aneurysm (24, [place])    S:  -Doing well mild numbness and tingling over the anterior shin which is improving  -Ambulating with a walker and advancing to a cane with plan for discharge to rehab tomorrow  -Swelling improved with Tubigrip, elevation and diuretics  -Cleansing with surgical soap  -No incisional complaints.  No leg, foot pain. No fevers, chills.      Exam:    LLE   -Incision is overall healed and closed.  Minor scabbing along the incisional line.  Mild redness from staple removal.  No drainage, dehiscence or evidence of infection  -Mild lower extremity edema.  Calf and thigh are soft.  No apparent tension on the incisions which are overall closed  -Foot warm. No wounds. L PT and peroneal signals present. Signal at the knee.   -Staples were removed in the office from the thigh and calf sites    Plan: Overall stable POD #35 after LLE bypass with PTFE and ligation of distal aneurysm.  Incisions overall healed and closed with minor scabbing along the incisional line, but no dehiscence or infection.  Staples were removed in the office today and replaced with Steri-Strips.  Betadine placed along the  incision.    -Continue with clopidogrel, apixaban 5 BID, and rosuvastatin 40  -Smoking cessation is strongly recommended  -Advance activity and walking with a cane as tolerated; lives alone, cautioned to be careful and avoid falls  -Continue preventative measures to avoid recurrent leg swelling with use of Tubigrip, periodic elevation and diuretics per other provider  -May shower, but no bathing  -Patient education regarding post-bypass instructions  -Instructed to call immediately if any changes or concerns, such as separation of incision, redness, increased pain, swelling, etc.  -Check baseline TIERA in Feb '25 and OV with Dr. Riggins    CC: Dr. Riggins       S/P femoral-tibial bypass  -As above under popliteal aneurysm  Orders:  •  VAS ARTERIAL DUPLEX- LOWER LIMB BILATERAL; Future  •  Cane    Peripheral arterial disease (HCC)    Orders:  •  VAS ARTERIAL DUPLEX- LOWER LIMB BILATERAL; Future  •  Cane    Tobacco user  -Continued and complete smoking cessation strongly encouraged  -He has patches at home  -Will place referral for smoking cessation follow-up on this issue           History of Present Illness   Patient is s/p  LEFT FEMORAL DISTAL BYPASS WITH PTFE GRAFT; LIGATION OF DISTAL ANEURYSM on 11/11/24 by Dr. Riggins.  Pt states that he has some numbness and tingling in the Lt foot and shin area that is improving. Pt denies pain, discoloration, or drainage from the incision sites. Pt denies fevers or chills. Pt states that the swelling has gotten better.     EDWIN Mcgrath is a 69 y.o. male smoker, BPH, hyperlipidemia, CAD, peripheral arterial disease found to have large left popliteal aneurysm for which he underwent left femoral to peroneal bypass with 6mm ringed PTFE graft and ligation of distal aneurysm by Dr. Riggins 11/11/24.    Patient was seen in the office by Dr. Riggins on 11/26/2024.  Due to swelling in the legs, he recommended Tubigrip, elevation and returning to the office for staple  "removal.    12/16/24: Mild numbness and tingling along the anterior shin to the ankle which seems to be improving.  No calf pain or foot pain.  He has been compliant with Tubigrip an elevation.  He is also on diuretic therapy.  Leg swelling is much improved.  He has been walking with a walker and advancing to a cane.  He has had no issues with the incisions which he is cleansing with surgical scrub.  No chest pain or fevers.      He is planned for discharge from rehab to home tomorrow. He has nicotine patches at home and encouraged to continue with smoking cessation.            OPERATIVE FINDINGS:   - Friable/diseased left peroneal artery. Augmentation of left PT signal with graft compression following ligation of popliteal artery.         Review of Systems   Constitutional: Negative.    HENT: Negative.     Eyes: Negative.    Respiratory: Negative.     Cardiovascular: Negative.    Gastrointestinal: Negative.    Endocrine: Negative.    Genitourinary: Negative.    Musculoskeletal:  Positive for arthralgias and gait problem.   Skin: Negative.    Allergic/Immunologic: Negative.    Neurological:  Positive for numbness.   Hematological: Negative.    Psychiatric/Behavioral: Negative.            Objective   /74 (BP Location: Left arm, Patient Position: Sitting)   Pulse 77   Resp 18   Ht 5' 9\" (1.753 m)   Wt 79.8 kg (176 lb)   SpO2 96%   BMI 25.99 kg/m²       Physical Exam  Awake alert notes x 3.  Pleasant apparent distress.  Moderately decreased breath sounds  Respirations nonlabored  Heart regular rate rhythm S1-S2  Left lower extremity with mild 1+ edema  Ambulating with walker    I have reviewed and made appropriate changes to the review of systems input by the medical assistant.    Vitals:    12/16/24 1301   BP: 132/74   BP Location: Left arm   Patient Position: Sitting   Pulse: 77   Resp: 18   SpO2: 96%   Weight: 79.8 kg (176 lb)   Height: 5' 9\" (1.753 m)       Patient Active Problem List   Diagnosis   • " Chronic obstructive pulmonary disease (COPD) (HCC)   • CAD (coronary artery disease)   • Benign prostatic hyperplasia with urinary obstruction   • Hyperlipidemia   • Status post primary angioplasty with coronary stent   • Tobacco user   • Cellulitis of right lower extremity   • Dyshidrotic eczema   • Onychomycosis   • Peripheral arterial disease (HCC)   • Popliteal aneurysm (HCC)   • S/P femoral-peroneal bypass       Past Surgical History:   Procedure Laterality Date   • COLONOSCOPY     • CORONARY ANGIOPLASTY Left 02/26/2021    with heart cath   • CORONARY STENT PLACEMENT     • INGUINAL HERNIA REPAIR  11/29/2017   • IL BYPASS W/VEIN FEMORAL-POPLITEAL Left 11/11/2024    Procedure: LEFT FEMORAL DISTAL BYPASS WITH PTFE GRAFT; LIGATION OF DISTAL ANEURYSM;  Surgeon: Elias Riggins MD;  Location: [facility];  Service: Vascular   • TONSILECTOMY AND ADNOIDECTOMY         Family History   Problem Relation Age of Onset   • Heart attack Father    • Prostate cancer Father        Social History     Socioeconomic History   • Marital status:      Spouse name: Not on file   • Number of children: Not on file   • Years of education: Not on file   • Highest education level: Not on file   Occupational History   • Not on file   Tobacco Use   • Smoking status: Every Day     Current packs/day: 1.00     Average packs/day: 1 pack/day for 40.1 years (40.1 ttl pk-yrs)     Types: Cigarettes     Start date: 10/30/1984   • Smokeless tobacco: Never   Vaping Use   • Vaping status: Never Used   Substance and Sexual Activity   • Alcohol use: Not Currently   • Drug use: Never   • Sexual activity: Not on file   Other Topics Concern   • Not on file   Social History Narrative   • Not on file     Social Drivers of Health     Financial Resource Strain: Not on file   Food Insecurity: No Food Insecurity (11/12/2024)    Hunger Vital Sign    • Worried About Running Out of Food in the Last Year: Never true    • Ran Out of Food in the Last Year:  Never true   Transportation Needs: No Transportation Needs (11/12/2024)    PRAPARE - Transportation    • Lack of Transportation (Medical): No    • Lack of Transportation (Non-Medical): No   Physical Activity: Not on file   Stress: Not on file   Social Connections: Unknown (6/18/2024)    Received from [facility]    Social Connections    • How often do you feel lonely or isolated from those around you? (Adult - for ages 18 years and over): Not on file   Intimate Partner Violence: Unknown (11/11/2024)    Nursing IPS    • Feels Physically and Emotionally Safe: Not on file    • Physically Hurt by Someone: Not on file    • Humiliated or Emotionally Abused by Someone: Not on file    • Physically Hurt by Someone: 2    • Hurt or Threatened by Someone: 2   Housing Stability: Low Risk  (11/12/2024)    Housing Stability Vital Sign    • Unable to Pay for Housing in the Last Year: No    • Number of Times Moved in the Last Year: 1    • Homeless in the Last Year: No       No Known Allergies      Current Outpatient Medications:   •  acetaminophen (TYLENOL) 325 mg tablet, Take 2 tablets (650 mg total) by mouth every 6 (six) hours as needed for mild pain, Disp: 180 tablet, Rfl: 0  •  apixaban (Eliquis) 5 mg, Take 1 tablet (5 mg total) by mouth 2 (two) times a day, Disp: 60 tablet, Rfl: 0  •  clopidogrel (PLAVIX) 75 mg tablet, Take 1 tablet (75 mg total) by mouth daily, Disp: 30 tablet, Rfl: 5  •  docusate sodium (COLACE) 100 mg capsule, Take 1 capsule (100 mg total) by mouth 2 (two) times a day, Disp: 60 capsule, Rfl: 0  •  econazole nitrate 1 % cream, APPLY TO FEET DAILY, Disp: , Rfl:   •  ezetimibe (ZETIA) 10 mg tablet, Take 10 mg by mouth daily, Disp: , Rfl:   •  furosemide (LASIX) 20 mg tablet, Take 20 mg by mouth daily, Disp: , Rfl:   •  metoprolol succinate (TOPROL-XL) 25 mg 24 hr tablet, 1 tablet (25 mg total) daily, Disp: , Rfl:   •  nicotine (NICODERM CQ) 14 mg/24hr TD 24 hr patch, Place 1 patch on the skin over 24 hours  daily, Disp: 28 patch, Rfl: 0  •  polyethylene glycol (MIRALAX) 17 g packet, Take 17 g by mouth daily, Disp: 510 g, Rfl: 0  •  rosuvastatin (CRESTOR) 10 MG tablet, , Disp: , Rfl:   •  tamsulosin (FLOMAX) 0.4 mg, Take 0.4 mg by mouth every morning, Disp: , Rfl:   •  losartan (COZAAR) 25 mg tablet, Take 1 tablet (25 mg total) by mouth daily (Patient not taking: Reported on 12/16/2024), Disp: , Rfl:

## 2024-12-16 NOTE — ASSESSMENT & PLAN NOTE
S/P left femoral to distal bypass with PTFE graft and ligation of distal aneurysm (11/11/24, [place])    S:  -Doing well mild numbness and tingling over the anterior shin which is improving  -Ambulating with a walker and advancing to a cane with plan for discharge to rehab tomorrow  -Swelling improved with Tubigrip, elevation and diuretics  -Cleansing with surgical soap  -No incisional complaints.  No leg, foot pain. No fevers, chills.      Exam:    LLE   -Incision is overall healed and closed.  Minor scabbing along the incisional line.  Mild redness from staple removal.  No drainage, dehiscence or evidence of infection  -Mild lower extremity edema.  Calf and thigh are soft.  No apparent tension on the incisions which are overall closed  -Foot warm. No wounds. L PT and peroneal signals present. Signal at the knee.   -Staples were removed in the office from the thigh and calf sites    Plan: Overall stable POD #35 after LLE bypass with PTFE and ligation of distal aneurysm.  Incisions overall healed and closed with minor scabbing along the incisional line, but no dehiscence or infection.  Staples were removed in the office today and replaced with Steri-Strips.  Betadine placed along the incision.    -Continue with clopidogrel, apixaban 5 BID, and rosuvastatin 40  -Smoking cessation is strongly recommended  -Advance activity and walking with a cane as tolerated; lives alone, cautioned to be careful and avoid falls  -Continue preventative measures to avoid recurrent leg swelling with use of Tubigrip, periodic elevation and diuretics per other provider  -May shower, but no bathing  -Patient education regarding post-bypass instructions  -Instructed to call immediately if any changes or concerns, such as separation of incision, redness, increased pain, swelling, etc.  -Check baseline TIERA in Feb '25 and OV with Dr. Riggins    CC: Dr. Riggins

## 2024-12-16 NOTE — PROGRESS NOTES
Name: Jose Elias Mcgrath      : 1955      MRN: [id number]  Encounter Provider: Charo Billy PA-C  Encounter Date: 2024   Encounter department: [facility] [place]  :  Assessment & Plan  Popliteal aneurysm (HCC)  S/P left femoral to distal bypass with PTFE graft and ligation of distal aneurysm (24, [place])    S:  -Doing well mild numbness and tingling over the anterior shin which is improving  -Ambulating with a walker and advancing to a cane with plan for discharge to rehab tomorrow  -Swelling improved with Tubigrip, elevation and diuretics  -Cleansing with surgical soap  -No incisional complaints.  No leg, foot pain. No fevers, chills.      Exam:    LLE   -Incision is overall healed and closed.  Minor scabbing along the incisional line.  Mild redness from staple removal.  No drainage, dehiscence or evidence of infection  -Mild lower extremity edema.  Calf and thigh are soft.  No apparent tension on the incisions which are overall closed  -Foot warm. No wounds. L PT and peroneal signals present. Signal at the knee.   -Staples were removed in the office from the thigh and calf sites    Plan: Overall stable POD #35 after LLE bypass with PTFE and ligation of distal aneurysm.  Incisions overall healed and closed with minor scabbing along the incisional line, but no dehiscence or infection.  Staples were removed in the office today and replaced with Steri-Strips.  Betadine placed along the incision.    -Continue with clopidogrel, apixaban 5 BID, and rosuvastatin 40  -Smoking cessation is strongly recommended  -Advance activity and walking with a cane as tolerated; lives alone, cautioned to be careful and avoid falls  -Continue preventative measures to avoid recurrent leg swelling with use of Tubigrip, periodic elevation and diuretics per other provider  -May shower, but no bathing  -Patient education regarding post-bypass instructions  -Instructed to call immediately if any changes or  concerns, such as separation of incision, redness, increased pain, swelling, etc.  -Check baseline TIERA in Feb '25 and OV with Dr. Riggins    CC: Dr. Riggins       S/P femoral-tibial bypass  -As above under popliteal aneurysm  Orders:    VAS ARTERIAL DUPLEX- LOWER LIMB BILATERAL; Future    Cane    Peripheral arterial disease (HCC)    Orders:    VAS ARTERIAL DUPLEX- LOWER LIMB BILATERAL; Future    Cane    Tobacco user  -Continued and complete smoking cessation strongly encouraged  -He has patches at home  -Will place referral for smoking cessation follow-up on this issue           History of Present Illness   Patient is s/p  LEFT FEMORAL DISTAL BYPASS WITH PTFE GRAFT; LIGATION OF DISTAL ANEURYSM on 11/11/24 by Dr. Riggins.  Pt denies pain, discoloration, or drainage from the incision sites. Pt denies fevers or chills. Pt states that the swelling has gotten better.     EDWIN Mcgrath is a 69 y.o. male smoker, BPH, hyperlipidemia, CAD, peripheral arterial disease found to have large left popliteal aneurysm for which he underwent left femoral to peroneal bypass with 6mm ringed PTFE graft and ligation of distal aneurysm by Dr. Riggins 11/11/24.    Patient was seen in the office by Dr. Riggins on 11/26/2024.  Due to swelling in the legs, he recommended Tubigrip, elevation and returning to the office for staple removal.    12/16/24: Mild numbness and tingling along the anterior shin to the ankle which seems to be improving.  No calf pain or foot pain.  He has been compliant with Tubigrip an elevation.  He is also on diuretic therapy.  Leg swelling is much improved.  He has been walking with a walker and advancing to a cane.  He has had no issues with the incisions which he is cleansing with surgical scrub.  No chest pain or fevers.      He is planned for discharge from rehab to home tomorrow. He has nicotine patches at home and encouraged to continue with smoking cessation.            OPERATIVE FINDINGS:   -  "Friable/diseased left peroneal artery. Augmentation of left PT signal with graft compression following ligation of popliteal artery.         Review of Systems   Constitutional: Negative.    HENT: Negative.     Eyes: Negative.    Respiratory: Negative.     Cardiovascular: Negative.    Gastrointestinal: Negative.    Endocrine: Negative.    Genitourinary: Negative.    Musculoskeletal:  Positive for arthralgias and gait problem.   Skin: Negative.    Allergic/Immunologic: Negative.    Neurological:  Positive for numbness.   Hematological: Negative.    Psychiatric/Behavioral: Negative.            Objective   /74 (BP Location: Left arm, Patient Position: Sitting)   Pulse 77   Resp 18   Ht 5' 9\" (1.753 m)   Wt 79.8 kg (176 lb)   SpO2 96%   BMI 25.99 kg/m²       Physical Exam  Awake alert notes x 3.  Pleasant apparent distress.  Moderately decreased breath sounds  Respirations nonlabored  Heart regular rate rhythm S1-S2  Left lower extremity with mild 1+ edema  Ambulating with walker    I have reviewed and made appropriate changes to the review of systems input by the medical assistant.    Vitals:    12/16/24 1301   BP: 132/74   BP Location: Left arm   Patient Position: Sitting   Pulse: 77   Resp: 18   SpO2: 96%   Weight: 79.8 kg (176 lb)   Height: 5' 9\" (1.753 m)       Patient Active Problem List   Diagnosis    Chronic obstructive pulmonary disease (COPD) (HCC)    CAD (coronary artery disease)    Benign prostatic hyperplasia with urinary obstruction    Hyperlipidemia    Status post primary angioplasty with coronary stent    Tobacco user    Cellulitis of right lower extremity    Dyshidrotic eczema    Onychomycosis    Peripheral arterial disease (HCC)    Popliteal aneurysm (HCC)    S/P femoral-peroneal bypass       Past Surgical History:   Procedure Laterality Date    COLONOSCOPY      CORONARY ANGIOPLASTY Left 02/26/2021    with heart cath    CORONARY STENT PLACEMENT      INGUINAL HERNIA REPAIR  11/29/2017    CO " BYPASS W/VEIN FEMORAL-POPLITEAL Left 11/11/2024    Procedure: LEFT FEMORAL DISTAL BYPASS WITH PTFE GRAFT; LIGATION OF DISTAL ANEURYSM;  Surgeon: Elias Riggins MD;  Location: [facility];  Service: Vascular    TONSILECTOMY AND ADNOIDECTOMY         Family History   Problem Relation Age of Onset    Heart attack Father     Prostate cancer Father        Social History     Socioeconomic History    Marital status:      Spouse name: Not on file    Number of children: Not on file    Years of education: Not on file    Highest education level: Not on file   Occupational History    Not on file   Tobacco Use    Smoking status: Every Day     Current packs/day: 1.00     Average packs/day: 1 pack/day for 40.1 years (40.1 ttl pk-yrs)     Types: Cigarettes     Start date: 10/30/1984    Smokeless tobacco: Never   Vaping Use    Vaping status: Never Used   Substance and Sexual Activity    Alcohol use: Not Currently    Drug use: Never    Sexual activity: Not on file   Other Topics Concern    Not on file   Social History Narrative    Not on file     Social Drivers of Health     Financial Resource Strain: Not on file   Food Insecurity: No Food Insecurity (11/12/2024)    Hunger Vital Sign     Worried About Running Out of Food in the Last Year: Never true     Ran Out of Food in the Last Year: Never true   Transportation Needs: No Transportation Needs (11/12/2024)    PRAPARE - Transportation     Lack of Transportation (Medical): No     Lack of Transportation (Non-Medical): No   Physical Activity: Not on file   Stress: Not on file   Social Connections: Unknown (6/18/2024)    Received from [facility]    Social [place]     How often do you feel lonely or isolated from those around you? (Adult - for ages 18 years and over): Not on file   Intimate Partner Violence: Unknown (11/11/2024)    Nursing IPS     Feels Physically and Emotionally Safe: Not on file     Physically Hurt by Someone: Not on file     Humiliated or Emotionally  Abused by Someone: Not on file     Physically Hurt by Someone: 2     Hurt or Threatened by Someone: 2   Housing Stability: Low Risk  (11/12/2024)    Housing Stability Vital Sign     Unable to Pay for Housing in the Last Year: No     Number of Times Moved in the Last Year: 1     Homeless in the Last Year: No       No Known Allergies      Current Outpatient Medications:     acetaminophen (TYLENOL) 325 mg tablet, Take 2 tablets (650 mg total) by mouth every 6 (six) hours as needed for mild pain, Disp: 180 tablet, Rfl: 0    apixaban (Eliquis) 5 mg, Take 1 tablet (5 mg total) by mouth 2 (two) times a day, Disp: 60 tablet, Rfl: 0    clopidogrel (PLAVIX) 75 mg tablet, Take 1 tablet (75 mg total) by mouth daily, Disp: 30 tablet, Rfl: 5    docusate sodium (COLACE) 100 mg capsule, Take 1 capsule (100 mg total) by mouth 2 (two) times a day, Disp: 60 capsule, Rfl: 0    econazole nitrate 1 % cream, APPLY TO FEET DAILY, Disp: , Rfl:     ezetimibe (ZETIA) 10 mg tablet, Take 10 mg by mouth daily, Disp: , Rfl:     furosemide (LASIX) 20 mg tablet, Take 20 mg by mouth daily, Disp: , Rfl:     metoprolol succinate (TOPROL-XL) 25 mg 24 hr tablet, 1 tablet (25 mg total) daily, Disp: , Rfl:     nicotine (NICODERM CQ) 14 mg/24hr TD 24 hr patch, Place 1 patch on the skin over 24 hours daily, Disp: 28 patch, Rfl: 0    polyethylene glycol (MIRALAX) 17 g packet, Take 17 g by mouth daily, Disp: 510 g, Rfl: 0    rosuvastatin (CRESTOR) 10 MG tablet, , Disp: , Rfl:     tamsulosin (FLOMAX) 0.4 mg, Take 0.4 mg by mouth every morning, Disp: , Rfl:     losartan (COZAAR) 25 mg tablet, Take 1 tablet (25 mg total) by mouth daily (Patient not taking: Reported on 12/16/2024), Disp: , Rfl:

## 2024-12-16 NOTE — ASSESSMENT & PLAN NOTE
-Continued and complete smoking cessation strongly encouraged  -He has patches at home  -Will place referral for smoking cessation follow-up on this issue

## 2024-12-16 NOTE — ASSESSMENT & PLAN NOTE
-As above under popliteal aneurysm  Orders:    VAS ARTERIAL DUPLEX- LOWER LIMB BILATERAL; Future    Cane

## 2025-01-06 DIAGNOSIS — I73.9 PAD (PERIPHERAL ARTERY DISEASE) (HCC): ICD-10-CM

## 2025-01-06 NOTE — TELEPHONE ENCOUNTER
Reason for call:   [x] Refill   [] Prior Auth  [] Other:     Office:   [] PCP/Provider -   [x] Specialty/Provider - [facility]/ HARI Billy    Medication: apixaban (Eliquis) 5 mg     Dose/Frequency: Take 1 tablet (5 mg total) by mouth 2 (two) times a day    Quantity: 180    Pharmacy: [id number] [address] [phone]    Does the patient have enough for 3 days?   [] Yes   [x] No - Send as HP to POD

## 2025-02-17 ENCOUNTER — HOSPITAL ENCOUNTER (OUTPATIENT)
Facility: CLINIC | Age: 70
Discharge: HOME | End: 2025-02-17
Payer: MEDICARE

## 2025-02-17 DIAGNOSIS — I73.9 PERIPHERAL ARTERIAL DISEASE (HCC): ICD-10-CM

## 2025-02-17 DIAGNOSIS — Z98.890 S/P FEMORAL-TIBIAL BYPASS: ICD-10-CM

## 2025-02-17 PROCEDURE — 93925 LOWER EXTREMITY STUDY: CPT

## 2025-02-17 PROCEDURE — 93923 UPR/LXTR ART STDY 3+ LVLS: CPT

## 2025-02-18 PROCEDURE — 93925 LOWER EXTREMITY STUDY: CPT | Performed by: STUDENT IN AN ORGANIZED HEALTH CARE EDUCATION/TRAINING PROGRAM

## 2025-02-18 PROCEDURE — 93922 UPR/L XTREMITY ART 2 LEVELS: CPT | Performed by: STUDENT IN AN ORGANIZED HEALTH CARE EDUCATION/TRAINING PROGRAM

## 2025-02-19 ENCOUNTER — TELEPHONE (OUTPATIENT)
Age: 70
End: 2025-02-19

## 2025-02-19 ENCOUNTER — RESULTS FOLLOW-UP (OUTPATIENT)
Dept: VASCULAR SURGERY | Facility: CLINIC | Age: 70
End: 2025-02-19

## 2025-02-19 NOTE — TELEPHONE ENCOUNTER
Caller: Jose Elias Mcgrath    Doctor: Dr. Riggins    Reason for call: Pt called to confirm appt for 2/21. Appt is confirmed. Pt requesting lyft ride for appt.     Pt stating his p/u location will not be his residence but will be at the [facility] in [place] at [address]. Pt stated he would be waitingat the main entrance of the hospital. Please advise, thank you.     Call back#: [phone]

## 2025-02-21 ENCOUNTER — TELEPHONE (OUTPATIENT)
Age: 70
End: 2025-02-21

## 2025-02-21 NOTE — TELEPHONE ENCOUNTER
Pt canceled his apt for today and wanted to provide an update on his progress. Pt was to be seen for a 2 month f/u S/P left femoral to distal bypass with PTFE graft and ligation of distal aneurysm 11/11/24 and to review his Arterial Duplex done 2/19/25.     Pt reports no redness, swelling, discoloration in the left lower ext. Some numbness and tingling still present in the foot when laying flat and the foot is warm but not as warm as the right.     The patient completed rehab on 12/17 using a walking cane and has been walking on his own since the start of the new year. The patient reports he continues all medications prescribed and home exercises and he has full mobility.    Advised patient would inform provider of update and apt cancellation. PT is now scheduled for 3/19/25. Advised patient to call should he start to have any increase in symptoms.

## 2025-03-17 ENCOUNTER — TELEPHONE (OUTPATIENT)
Dept: CARDIOLOGY CLINIC | Facility: CLINIC | Age: 70
End: 2025-03-17

## 2025-03-17 NOTE — TELEPHONE ENCOUNTER
Patient called the RX Refill Line. Message is being forwarded to the office.     Patient is requesting a refill on his Toprol Xl 25mg. Patients last prescription was prescribed while in the hospital under Anat Quiles PA-C. Patient states that he would like Dr Ez Chavez to take over prescribing this medication. Patient  is aware he has not been seen for awhile and is ok with setting up an office visit.    Please contact patient at [phone]    Pharmacy: [facility]

## 2025-03-18 ENCOUNTER — TELEPHONE (OUTPATIENT)
Age: 70
End: 2025-03-18

## 2025-03-18 NOTE — TELEPHONE ENCOUNTER
Pt called to confirm he will be at visit w/ Dr. Riggins in [place] office tomorrow morning however he needs Lyft transportation arranged.    Warm transferred pt to [place] in [place] office, she will arrange ride for pt.

## 2025-03-19 ENCOUNTER — OFFICE VISIT (OUTPATIENT)
Dept: VASCULAR SURGERY | Facility: CLINIC | Age: 70
End: 2025-03-19
Payer: MEDICARE

## 2025-03-19 VITALS
HEIGHT: 69 IN | BODY MASS INDEX: 26.96 KG/M2 | HEART RATE: 95 BPM | WEIGHT: 182 LBS | OXYGEN SATURATION: 98 % | SYSTOLIC BLOOD PRESSURE: 151 MMHG | DIASTOLIC BLOOD PRESSURE: 103 MMHG

## 2025-03-19 DIAGNOSIS — Z98.890 S/P FEMORAL-TIBIAL BYPASS: Primary | ICD-10-CM

## 2025-03-19 PROCEDURE — 99214 OFFICE O/P EST MOD 30 MIN: CPT | Performed by: STUDENT IN AN ORGANIZED HEALTH CARE EDUCATION/TRAINING PROGRAM

## 2025-03-19 NOTE — PROGRESS NOTES
Vascular Surgery Return Patient Visit  Date: 03/19/25      Assessment:  Jose Elias Mcgrath is a 70 y.o. male s/p L SFA-peroneal artery bypass w/ PTFE for pop aneurysm exclusion.  While it is unfortunate that his bypass is now thrombosed, he does not appear to be experiencing any ill effect with respect to symptoms.  His left INNA did drop significantly, though this is at odds with his clinical appearance.    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  His AT/PT are occluded, but he has inline flow through the peroneal artery.  His anatomy appears amenable to interposition repair of the popliteal aneurysm on the side.      Plan:  - Will plan to replete left lower extremity duplex to confirm whether or not the most recent left INNA represents a new baseline.  - Return in 3 months to reassess symptoms of his left lower extremity and review his INNA results.  Provided no worsening of symptoms, will plan to discuss repair of his right popliteal aneurysm at that time.    Diagnoses and all orders for this visit:    S/P femoral-peroneal bypass  -     VAS ARTERIAL DUPLEX-LOWER LIMB UNILATERAL / GTP; Future        Subjective:     HPI:  Jose Elias Mcgrath is a 70 y.o. male who underwent L SFA-peroneal bypass w/ ringed PTFE for pop aneurysm exclusion on 11/11/24.  Unfortunately, on his initial postop surveillance duplex obtained 2/17/2025, his bypass was noted to be occluded.  His INNA on the left was also noted to have a significant decrease to 0.36 (previously 0.92).  This is somewhat surprising as prior to surgery, he did not have any inline flow to his foot so would not expect occlusion of his bypass to result in any substantial change in his INNA, though loss of collaterals originating from the aneurysm following ligation could have contributed to this decrease to some degree as well. Despite the change in INNA, he has no ischemic symptoms in his left lower extremity.  He denies  numbness/motor dysfunction and says he continues to be able to ambulate any distance without claudication.  He also says that he walks up 15 steps to his apartment at least once a day without issues.  In this context, have some suspicion that his decrease in INNA may be erroneous.    He also has a 1.5 cm popliteal aneurysm containing mural thrombus on the right. His AT and PT are chronically occluded, but he does have inline flow to the peroneal artery on this side. He has marginal saphenous vein on the right but would be a reasonable candidate for interposition.    Initial visit 10/1/24:  Jose Elias Mcgrath is a 69 y.o. male with PMH of NSTEMI s/p PCI in 2021 (last echo 7/2023 EF 55%, no clinically significant AS or valvular regurgitation). On eliquis for BL pop aneurysms and plavix (transitioned from brillinta due to starting eliquis). He presents today to discuss BL pop aneurysms, which were incidentally identified during recent hospitalization (details outlined below).      9/26/2024 CTA demonstrated 3.6 cm infrarenal AAA in addition to left 2.6 cm popliteal aneurysm with mural thrombus and a right 1.5 cm popliteal aneurysm.  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  He does fortunately have preserved peroneal runoff bilaterally.  This appears to be a chronic process given the robust size of his peroneal arteries and he denies any pain/sensory/motor deficits.     Based on review of care everywhere notes he presented to an urgent care in 8/2023 with foot pain, had been treating himself for athlete's foot with antifungal cream.  He was given a prescription for 7d doxycycline and 2d fluconazole.  He had been treated with 3 months of terbinafine prior to this.  Since 1/2024, he has been following with dermatology and treated with antifungal creams and Bactrim/Keflex.  He was also diagnosed with eczema and started on methotrexate for this but has not been taking it.      He was admitted on  "9/24/2024 for bilateral lower extremity swelling and cellulitis.  He was treated with IV antibiotics and blood cultures were no growth x5d.  BL venous duplex was negative for DVT.  Underlying factor promoting cellulitis infection was determined to be poorly controlled eczema.  Plan for this was obtaining better control of his eczema in outpatient setting following successful treatment of cellulitis.    Objective:    ROS:  All systems were reviewed and are negative except those mentioned in HPI and below.      Vitals: BP (!) 151/103 (BP Location: Left arm, Patient Position: Sitting)   Pulse 95   Ht 5' 9\" (1.753 m)   Wt 82.6 kg (182 lb)   SpO2 98%   BMI 26.88 kg/m²      General: male appears stated age, no apparent distress, alert and oriented   HEENT: normocephalic, atraumatic   Cardiovascular: hemodynamically stable   Chest/Lungs: no increased work of breathing, chest rise equal bilaterally   Abdomen: Soft, ND, NT, no pulsatile masses   Extremities: LLE pitting edema up to about the level of the mid-thigh    L DP/PT signals   Skin: warm and dry   Neuro: no gross deficits      Medications:  Current Outpatient Medications   Medication Sig Dispense Refill    acetaminophen (TYLENOL) 325 mg tablet Take 2 tablets (650 mg total) by mouth every 6 (six) hours as needed for mild pain 180 tablet 0    apixaban (Eliquis) 5 mg Take 1 tablet (5 mg total) by mouth 2 (two) times a day 180 tablet 1    clopidogrel (PLAVIX) 75 mg tablet Take 1 tablet (75 mg total) by mouth daily 30 tablet 5    econazole nitrate 1 % cream APPLY TO FEET DAILY      ezetimibe (ZETIA) 10 mg tablet Take 10 mg by mouth daily      metoprolol succinate (TOPROL-XL) 25 mg 24 hr tablet 1 tablet (25 mg total) daily      nicotine (NICODERM CQ) 14 mg/24hr TD 24 hr patch Place 1 patch on the skin over 24 hours daily 28 patch 0    rosuvastatin (CRESTOR) 10 MG tablet       tamsulosin (FLOMAX) 0.4 mg Take 0.4 mg by mouth every morning      docusate sodium (COLACE) " 100 mg capsule Take 1 capsule (100 mg total) by mouth 2 (two) times a day 60 capsule 0    furosemide (LASIX) 20 mg tablet Take 20 mg by mouth daily (Patient not taking: Reported on 3/19/2025)      losartan (COZAAR) 25 mg tablet Take 1 tablet (25 mg total) by mouth daily (Patient not taking: Reported on 12/16/2024)      polyethylene glycol (MIRALAX) 17 g packet Take 17 g by mouth daily 510 g 0     No current facility-administered medications for this visit.       Allergies:  No Known Allergies     PMH:  Past Medical History:   Diagnosis Date    BPH (benign prostatic hyperplasia)     CAD (coronary artery disease)     Enlarged prostate     Hyperlipidemia     Hypertension     Vitamin D deficiency         PSH:  Past Surgical History:   Procedure Laterality Date    COLONOSCOPY      CORONARY ANGIOPLASTY Left 02/26/2021    with heart cath    CORONARY STENT PLACEMENT      INGUINAL HERNIA REPAIR  11/29/2017    NC BYPASS W/VEIN FEMORAL-POPLITEAL Left 11/11/2024    Procedure: LEFT FEMORAL DISTAL BYPASS WITH PTFE GRAFT; LIGATION OF DISTAL ANEURYSM;  Surgeon: Elias Riggins MD;  Location: [facility];  Service: Vascular    TONSILECTOMY AND ADNOIDECTOMY          FHx:  Family History   Problem Relation Age of Onset    Heart attack Father     Prostate cancer Father         SHx:  Social History     Socioeconomic History    Marital status:      Spouse name: Not on file    Number of children: Not on file    Years of education: Not on file    Highest education level: Not on file   Occupational History    Not on file   Tobacco Use    Smoking status: Every Day     Current packs/day: 1.00     Average packs/day: 1 pack/day for 40.4 years (40.4 ttl pk-yrs)     Types: Cigarettes     Start date: 10/30/1984    Smokeless tobacco: Never   Vaping Use    Vaping status: Never Used   Substance and Sexual Activity    Alcohol use: Not Currently    Drug use: Never    Sexual activity: Not on file   Other Topics Concern    Not on file    Social History Narrative    Not on file     Social Drivers of Health     Financial Resource Strain: Not on file   Food Insecurity: No Food Insecurity (11/12/2024)    Hunger Vital Sign     Worried About Running Out of Food in the Last Year: Never true     Ran Out of Food in the Last Year: Never true   Transportation Needs: No Transportation Needs (11/12/2024)    PRAPARE - Transportation     Lack of Transportation (Medical): No     Lack of Transportation (Non-Medical): No   Physical Activity: Not on file   Stress: Not on file   Social Connections: Unknown (6/18/2024)    Received from [facility]     How often do you feel lonely or isolated from those around you? (Adult - for ages 18 years and over): Not on file   Intimate Partner Violence: Unknown (11/11/2024)    Nursing IPS     Feels Physically and Emotionally Safe: Not on file     Physically Hurt by Someone: Not on file     Humiliated or Emotionally Abused by Someone: Not on file     Physically Hurt by Someone: No     Hurt or Threatened by Someone: No   Housing Stability: Low Risk  (11/12/2024)    Housing Stability Vital Sign     Unable to Pay for Housing in the Last Year: No     Number of Times Moved in the Last Year: 1     Homeless in the Last Year: No

## 2025-03-25 NOTE — TELEPHONE ENCOUNTER
Left voice message regarding a refill from an Out of Network Provider to reach out to the office for a refill of his medication.

## 2025-04-11 DIAGNOSIS — I73.9 PAD (PERIPHERAL ARTERY DISEASE) (HCC): ICD-10-CM

## 2025-04-11 NOTE — TELEPHONE ENCOUNTER
Reason for call: Pt requesting medication to be sent to mail order pharmacy   [x] Refill   [] Prior Auth  [] Other:     Office:   [] PCP/Provider -   [x] Specialty/Provider -     Medication: apixaban (Eliquis) 5 mg     Dose/Frequency: Take 1 tablet (5 mg total) by mouth 2 (two) times a day     Quantity: 180 tablet     Pharmacy: [facility] - [address]     Mail Away Pharmacy   Does the patient have enough for 10 days?   [x] Yes   [] No - Send as HP to POD

## 2025-05-19 DIAGNOSIS — I25.5 ISCHEMIC CARDIOMYOPATHY: ICD-10-CM

## 2025-06-04 ENCOUNTER — HOSPITAL ENCOUNTER (OUTPATIENT)
Dept: NON INVASIVE DIAGNOSTICS | Facility: HOSPITAL | Age: 70
Discharge: HOME | End: 2025-06-04
Attending: STUDENT IN AN ORGANIZED HEALTH CARE EDUCATION/TRAINING PROGRAM
Payer: MEDICARE

## 2025-06-04 DIAGNOSIS — Z98.890 S/P FEMORAL-TIBIAL BYPASS: ICD-10-CM

## 2025-06-04 PROCEDURE — 93926 LOWER EXTREMITY STUDY: CPT

## 2025-06-05 PROCEDURE — 93926 LOWER EXTREMITY STUDY: CPT | Performed by: SURGERY

## 2025-06-05 PROCEDURE — 93922 UPR/L XTREMITY ART 2 LEVELS: CPT | Performed by: SURGERY

## 2025-06-06 ENCOUNTER — HOSPITAL ENCOUNTER (OUTPATIENT)
Dept: NON INVASIVE DIAGNOSTICS | Facility: HOSPITAL | Age: 70
Discharge: HOME | End: 2025-06-06
Attending: INTERNAL MEDICINE
Payer: MEDICARE

## 2025-06-06 VITALS
HEART RATE: 60 BPM | SYSTOLIC BLOOD PRESSURE: 140 MMHG | DIASTOLIC BLOOD PRESSURE: 68 MMHG | BODY MASS INDEX: 26.96 KG/M2 | WEIGHT: 182 LBS | HEIGHT: 69 IN

## 2025-06-06 DIAGNOSIS — I25.5 ISCHEMIC CARDIOMYOPATHY: ICD-10-CM

## 2025-06-06 LAB
AORTIC ROOT: 4 CM
ASCENDING AORTA: 3.4 CM
BSA FOR ECHO PROCEDURE: 1.98 M2
E WAVE DECELERATION TIME: 257 MS
E/A RATIO: 0.98
FRACTIONAL SHORTENING: 26 (ref 28–44)
INTERVENTRICULAR SEPTUM IN DIASTOLE (PARASTERNAL SHORT AXIS VIEW): 1.1 CM
INTERVENTRICULAR SEPTUM: 1.1 CM (ref 0.6–1.1)
LAAS-AP2: 14.7 CM2
LAAS-AP4: 13.8 CM2
LEFT ATRIUM SIZE: 3.9 CM
LEFT ATRIUM VOLUME (MOD BIPLANE): 36 ML
LEFT ATRIUM VOLUME INDEX (MOD BIPLANE): 18.2 ML/M2
LEFT INTERNAL DIMENSION IN SYSTOLE: 3.7 CM (ref 2.1–4)
LEFT VENTRICLE DIASTOLIC VOLUME (MOD BIPLANE): 86 ML
LEFT VENTRICLE DIASTOLIC VOLUME INDEX (MOD BIPLANE): 43.4 ML/M2
LEFT VENTRICLE SYSTOLIC VOLUME (MOD BIPLANE): 49 ML
LEFT VENTRICLE SYSTOLIC VOLUME INDEX (MOD BIPLANE): 24.7 ML/M2
LEFT VENTRICULAR INTERNAL DIMENSION IN DIASTOLE: 5 CM (ref 3.5–6)
LEFT VENTRICULAR POSTERIOR WALL IN END DIASTOLE: 1.1 CM
LEFT VENTRICULAR STROKE VOLUME: 61 ML
LV EF BIPLANE MOD: 42 %
LV EF US.2D.A4C+ESTIMATED: 44 %
LVSV (TEICH): 61 ML
MV E'TISSUE VEL-LAT: 8 CM/S
MV E'TISSUE VEL-SEP: 8 CM/S
MV PEAK A VEL: 0.5 M/S
MV PEAK E VEL: 49 CM/S
MV STENOSIS PRESSURE HALF TIME: 74 MS
MV VALVE AREA P 1/2 METHOD: 2.97
RIGHT ATRIUM AREA SYSTOLE A4C: 11 CM2
RIGHT VENTRICLE ID DIMENSION: 3.7 CM
SL CV LEFT ATRIUM LENGTH A2C: 4.7 CM
SL CV LV EF: 45
SL CV PED ECHO LEFT VENTRICLE DIASTOLIC VOLUME (MOD BIPLANE) 2D: 120 ML
SL CV PED ECHO LEFT VENTRICLE SYSTOLIC VOLUME (MOD BIPLANE) 2D: 58 ML
TRICUSPID ANNULAR PLANE SYSTOLIC EXCURSION: 2.3 CM

## 2025-06-06 PROCEDURE — 93306 TTE W/DOPPLER COMPLETE: CPT | Performed by: INTERNAL MEDICINE

## 2025-06-06 PROCEDURE — 93306 TTE W/DOPPLER COMPLETE: CPT

## 2025-06-09 ENCOUNTER — TELEPHONE (OUTPATIENT)
Age: 70
End: 2025-06-09

## 2025-06-09 NOTE — TELEPHONE ENCOUNTER
Caller: Jose Elias Mcgrath    Doctor: Dr. Riggins/ Natalee Vu    Reason for call: Pt requesting Spinal Integration service for his appt on 6/11 @ 2pm in [place]. Pt is requesting the lyft  at the Main Entrance of [facility] in [place] at [address], [place], [facility]. Please advise and contact pt with Spinal Integration info.     Call back#: [phone]

## 2025-06-10 NOTE — TELEPHONE ENCOUNTER
Per Secure chat from Natalee, patient will need an ov with Dr Riggins to discuss procedure. Called pt to offer ov on 6/12/25 @ 1:30 pm - left message

## 2025-06-10 NOTE — TELEPHONE ENCOUNTER
Pt returning phone call. Attempted transfer to office for confirmation of appt change but was unable to reach anyone. Pt is able to make the appt for 6/12 at 1:30pm if this is still available. Please place lyft request and contact the pt with any changes or information.

## 2025-06-11 NOTE — TELEPHONE ENCOUNTER
Natalee asked me to reach out to PT to attempt to reschedule his 2pm appt because she want to have the results gone over with Dr. Riggins. If he calls back schedule him for the first available.

## 2025-06-11 NOTE — TELEPHONE ENCOUNTER
LMOM with sister in law and was unable to leave a message with PT. Dr Riggins said via secure chat that PT is ok to be seen at first available.

## 2025-06-11 NOTE — TELEPHONE ENCOUNTER
Caller: Jose Elias Mcgrath    Doctor: Dr. Riggins    Reason for call: Returning call to reschedule appointment per Natalee for patient to see Dr Riggins. First available was 8/6/25 but patient did not want a morning appointment. Office visit now scheduled for 8/21/25 with Dr Riggins in [place].    Call back#: [phone]

## 2025-07-01 RX ORDER — NITROGLYCERIN 0.4 MG/1
0.4 TABLET SUBLINGUAL
COMMUNITY
Start: 2025-05-19

## 2025-07-03 DIAGNOSIS — I73.9 PERIPHERAL ARTERIAL DISEASE (HCC): ICD-10-CM

## 2025-07-03 NOTE — TELEPHONE ENCOUNTER
Pharmacy change to mail order needs 90 day supply     Reason for call:   [x] Refill   [] Prior Auth  [] Other:     Office:   [x] PCP/Provider - Elias Riggins  [] Specialty/Provider -     Medication: Clopidogrel    Dose/Frequency: 75 mg Daily     Quantity: 90    Pharmacy: [facility]   Does the patient have enough for 3 days?   [x] Yes   [] No - Send as HP to POD    Mail Away Pharmacy   Does the patient have enough for 10 days?   [] Yes   [] No - Send as HP to POD

## 2025-07-07 ENCOUNTER — TELEPHONE (OUTPATIENT)
Age: 70
End: 2025-07-07

## 2025-07-07 RX ORDER — CLOPIDOGREL BISULFATE 75 MG/1
75 TABLET ORAL DAILY
Qty: 90 TABLET | Refills: 0 | Status: SHIPPED | OUTPATIENT
Start: 2025-07-07

## 2025-08-01 ENCOUNTER — TELEPHONE (OUTPATIENT)
Age: 70
End: 2025-08-01

## 2025-08-05 ENCOUNTER — TELEMEDICINE (OUTPATIENT)
Dept: VASCULAR SURGERY | Facility: CLINIC | Age: 70
End: 2025-08-05
Payer: MEDICARE

## 2025-08-05 DIAGNOSIS — I73.9 PERIPHERAL ARTERIAL DISEASE (HCC): Primary | ICD-10-CM

## 2025-08-05 DIAGNOSIS — I72.4 POPLITEAL ANEURYSM (HCC): ICD-10-CM

## 2025-08-05 PROCEDURE — 99214 OFFICE O/P EST MOD 30 MIN: CPT | Performed by: STUDENT IN AN ORGANIZED HEALTH CARE EDUCATION/TRAINING PROGRAM

## (undated) DEVICE — PAD GROUNDING DUAL ADULT

## (undated) DEVICE — EXOFIN PRECISION PEN HIGH VISCOSITY TOPICAL SKIN ADHESIVE: Brand: EXOFIN PRECISION PEN, 1G

## (undated) DEVICE — SUT SILK 4-0 18 IN A183H

## (undated) DEVICE — LIGACLIP MCA MULTIPLE CLIP APPLIERS, 20 MEDIUM CLIPS: Brand: LIGACLIP

## (undated) DEVICE — SUT SILK 3-0 18 IN A184H

## (undated) DEVICE — VESSEL CANNULA

## (undated) DEVICE — DECANTER: Brand: UNBRANDED

## (undated) DEVICE — TRAY FOLEY 16FR URIMETER SURESTEP

## (undated) DEVICE — SUT PROLENE 4-0 RB-1/RB-1 36 IN 8557H

## (undated) DEVICE — LIGACLIP MCA MULTIPLE CLIP APPLIERS, 20 SMALL CLIPS: Brand: LIGACLIP

## (undated) DEVICE — SUT MONOCRYL 2-0 CT-1 27 IN Y339H

## (undated) DEVICE — PETRI DISH STERILE

## (undated) DEVICE — PENCIL ELECTROSURG E-Z CLEAN -0035H

## (undated) DEVICE — SURGICEL FIBRILLAR 1 X 2

## (undated) DEVICE — STERILE BETHLEHEM FEM POP PACK: Brand: CARDINAL HEALTH

## (undated) DEVICE — SUT PROLENE 6-0 BV-1/BV-1 24 IN 8805H

## (undated) DEVICE — GLOVE SRG BIOGEL 7

## (undated) DEVICE — SUT MONOCRYL 4-0 PS-2 18 IN Y496G

## (undated) DEVICE — SUT SILK 2-0 18 IN A185H

## (undated) DEVICE — DRESSING MEPILEX AG BORDER POST-OP 4 X 8 IN

## (undated) DEVICE — SUT MONOCRYL 3-0 SH 27 IN Y416H

## (undated) DEVICE — DRESSING MEPILEX AG BORDER POST-OP 4 X 10 IN

## (undated) DEVICE — 40601 PROLONGED POSITIONING SYSTEM: Brand: 40601 PROLONGED POSITIONING SYSTEM

## (undated) DEVICE — 1/2 FORCE SURGICAL SPRING CLIP: Brand: STEALTH® SPRING CLIP

## (undated) DEVICE — CHLORAPREP HI-LITE 26ML ORANGE